# Patient Record
Sex: MALE | Race: WHITE | NOT HISPANIC OR LATINO | Employment: OTHER | URBAN - METROPOLITAN AREA
[De-identification: names, ages, dates, MRNs, and addresses within clinical notes are randomized per-mention and may not be internally consistent; named-entity substitution may affect disease eponyms.]

---

## 2019-05-08 ENCOUNTER — HOSPITAL ENCOUNTER (OUTPATIENT)
Dept: OTHER | Facility: HOSPITAL | Age: 69
Discharge: HOME | End: 2019-05-08

## 2020-10-29 ENCOUNTER — OFFICE VISIT CONVERTED (OUTPATIENT)
Dept: FAMILY MEDICINE CLINIC | Age: 70
End: 2020-10-29
Attending: FAMILY MEDICINE

## 2020-11-24 ENCOUNTER — OFFICE VISIT CONVERTED (OUTPATIENT)
Dept: FAMILY MEDICINE CLINIC | Age: 70
End: 2020-11-24
Attending: FAMILY MEDICINE

## 2020-11-24 ENCOUNTER — HOSPITAL ENCOUNTER (OUTPATIENT)
Dept: OTHER | Facility: HOSPITAL | Age: 70
Discharge: HOME | End: 2020-11-24
Attending: FAMILY MEDICINE

## 2020-11-24 LAB
BASOPHILS # BLD MANUAL: 0.04 10*3/UL (ref 0–0.2)
BASOPHILS NFR BLD MANUAL: 0.7 % (ref 0–3)
DEPRECATED RDW RBC AUTO: 52.1 FL
EOSINOPHIL # BLD MANUAL: 0.07 10*3/UL (ref 0–0.7)
EOSINOPHIL NFR BLD MANUAL: 1.3 % (ref 0–7)
ERYTHROCYTE [DISTWIDTH] IN BLOOD BY AUTOMATED COUNT: 13.1 % (ref 11.5–14.5)
GRANS (ABSOLUTE): 3.59 10*3/UL (ref 2–8)
GRANS: 67.3 % (ref 30–85)
HBA1C MFR BLD: 15.2 G/DL (ref 14–18)
HCT VFR BLD AUTO: 44.7 % (ref 42–52)
IMM GRANULOCYTES # BLD: 0 10*3/UL (ref 0–0.54)
IMM GRANULOCYTES NFR BLD: 0 % (ref 0–0.43)
LYMPHOCYTES # BLD MANUAL: 1.28 10*3/UL (ref 1–5)
LYMPHOCYTES NFR BLD MANUAL: 6.7 % (ref 3–10)
MCH RBC QN AUTO: 35.8 PG (ref 27–31)
MCHC RBC AUTO-ENTMCNC: 34 G/DL (ref 33–37)
MCV RBC AUTO: 105.4 FL (ref 80–96)
MONOCYTES # BLD AUTO: 0.36 10*3/UL (ref 0.2–1.2)
PLATELET # BLD AUTO: 155 10*3/UL (ref 130–400)
PMV BLD AUTO: 10.7 FL (ref 7.4–10.4)
RBC # BLD AUTO: 4.24 10*6/UL (ref 4.7–6.1)
VARIANT LYMPHS NFR BLD MANUAL: 24 % (ref 20–45)
WBC # BLD AUTO: 5.34 10*3/UL (ref 4.8–10.8)

## 2020-11-25 LAB
ALBUMIN SERPL-MCNC: 4.3 G/DL (ref 3.5–5)
ALBUMIN/GLOB SERPL: 1.6 {RATIO} (ref 1.4–2.6)
ALP SERPL-CCNC: 76 U/L (ref 56–155)
ALT SERPL-CCNC: 30 U/L (ref 10–40)
ANION GAP SERPL CALC-SCNC: 15 MMOL/L (ref 8–19)
AST SERPL-CCNC: 40 U/L (ref 15–50)
BILIRUB SERPL-MCNC: 0.29 MG/DL (ref 0.2–1.3)
BUN SERPL-MCNC: 13 MG/DL (ref 5–25)
BUN/CREAT SERPL: 12 {RATIO} (ref 6–20)
CALCIUM SERPL-MCNC: 9 MG/DL (ref 8.7–10.4)
CHLORIDE SERPL-SCNC: 99 MMOL/L (ref 99–111)
CONV CO2: 28 MMOL/L (ref 22–32)
CONV TOTAL PROTEIN: 7 G/DL (ref 6.3–8.2)
CREAT UR-MCNC: 1.1 MG/DL (ref 0.7–1.2)
GFR SERPLBLD BASED ON 1.73 SQ M-ARVRAT: >60 ML/MIN/{1.73_M2}
GLOBULIN UR ELPH-MCNC: 2.7 G/DL (ref 2–3.5)
GLUCOSE SERPL-MCNC: 143 MG/DL (ref 70–99)
OSMOLALITY SERPL CALC.SUM OF ELEC: 287 MOSM/KG (ref 273–304)
POTASSIUM SERPL-SCNC: 4.7 MMOL/L (ref 3.5–5.3)
SODIUM SERPL-SCNC: 137 MMOL/L (ref 135–147)
TESTOST SERPL-MCNC: 251 NG/DL (ref 193–740)

## 2020-11-26 LAB — PRIMIDONE SERPL-MCNC: 1.4 UG/ML (ref 5–12)

## 2020-12-23 ENCOUNTER — OFFICE VISIT CONVERTED (OUTPATIENT)
Dept: FAMILY MEDICINE CLINIC | Age: 70
End: 2020-12-23
Attending: FAMILY MEDICINE

## 2020-12-29 ENCOUNTER — HOSPITAL ENCOUNTER (OUTPATIENT)
Dept: OTHER | Facility: HOSPITAL | Age: 70
Discharge: HOME | End: 2020-12-29
Attending: FAMILY MEDICINE

## 2020-12-29 LAB
BASOPHILS # BLD MANUAL: 0.04 10*3/UL (ref 0–0.2)
BASOPHILS NFR BLD MANUAL: 0.6 % (ref 0–3)
DEPRECATED RDW RBC AUTO: 49.3 FL
EOSINOPHIL # BLD MANUAL: 0.25 10*3/UL (ref 0–0.7)
EOSINOPHIL NFR BLD MANUAL: 4 % (ref 0–7)
ERYTHROCYTE [DISTWIDTH] IN BLOOD BY AUTOMATED COUNT: 13 % (ref 11.5–14.5)
FOLATE SERPL-MCNC: >20 NG/ML (ref 4.8–20)
GRANS (ABSOLUTE): 3.72 10*3/UL (ref 2–8)
GRANS: 60 % (ref 30–85)
HBA1C MFR BLD: 15.2 G/DL (ref 14–18)
HCT VFR BLD AUTO: 44.5 % (ref 42–52)
IMM GRANULOCYTES # BLD: 0.01 10*3/UL (ref 0–0.54)
IMM GRANULOCYTES NFR BLD: 0.2 % (ref 0–0.43)
LYMPHOCYTES # BLD MANUAL: 1.58 10*3/UL (ref 1–5)
LYMPHOCYTES NFR BLD MANUAL: 9.7 % (ref 3–10)
MCH RBC QN AUTO: 35.1 PG (ref 27–31)
MCHC RBC AUTO-ENTMCNC: 34.2 G/DL (ref 33–37)
MCV RBC AUTO: 102.8 FL (ref 80–96)
MONOCYTES # BLD AUTO: 0.6 10*3/UL (ref 0.2–1.2)
PLATELET # BLD AUTO: 154 10*3/UL (ref 130–400)
PMV BLD AUTO: 10.5 FL (ref 7.4–10.4)
RBC # BLD AUTO: 4.33 10*6/UL (ref 4.7–6.1)
TSH SERPL-ACNC: 3.6 M[IU]/L (ref 0.27–4.2)
VARIANT LYMPHS NFR BLD MANUAL: 25.5 % (ref 20–45)
VIT B12 SERPL-MCNC: 1442 PG/ML (ref 211–911)
WBC # BLD AUTO: 6.2 10*3/UL (ref 4.8–10.8)

## 2020-12-31 LAB — PRIMIDONE SERPL-MCNC: 3.1 UG/ML (ref 5–12)

## 2021-05-18 NOTE — PROGRESS NOTES
Cesario Dueñas  1950     Office/Outpatient Visit    Visit Date: Tue, Nov 24, 2020 10:02 am    Provider: Chuck Portillo MD (Assistant: Vika Ariza RN)    Location: [facility]        Electronically signed by Chuck Portillo MD on  11/24/2020 12:39:37 PM                             Subjective:        CC: Mr. Dueñas is a 70 year old White male.  This is a follow-up visit.          HPI:       Pertaining to testosterone deficiency, Cesario takes testosterone shots that are prescribed by his previous PCPs office.  He takes 200 mg IM every 2 weeks. He would like to transfer these shots to our office.            In regard to the hyperlipidemia, unspecified, current treatment includes Lipitor.  Compliance with treatment has been good; he takes his medication as directed and follows up as directed.  He denies experiencing any hypercholesterolemia related symptoms.  He does not know results of any recent lab monitoring.        Pertaining to anxiety and depression, Cesario has a longstanding history.  He has trialed multiple medications in the past and has even tried ECT before.  His current regimen includes gabapentin 600 mg BID and Xanax 0.5 mg 3 times daily as needed.  He follows with a psychiatrist regularly.  He says his symptoms are stable. No SI or HI.      Pertaining to tremor, Cesario reports that the primidone 50 mg daily started at last visit has elicited minor improvement in his hand tremor. Again, tremor is worse with activity and does not seem to be present at rest. He has tried an failed propranolol in the past.       Pertaining to allergic rhinitis, Cesario reports his symptoms are currently stable.  His regimen includes allergy shots, albuterol as needed azelastine as needed and Zyrtec daily.      Pertaining to bilateral shoulder pain, Cesario was referred to PT after last visit and he says that this has been helpful. His pain has improved as has his mobility. He still has  some issues with overhead movements.    ROS:     CONSTITUTIONAL:  Negative for chills, fatigue, fever, and weight change.      E/N/T:  Negative for ear pain and tinnitus.      CARDIOVASCULAR:  Negative for chest pain, dizziness, palpitations and edema.      RESPIRATORY:  Negative for dyspnea and cough.      GASTROINTESTINAL:  Negative for abdominal pain, diarrhea, nausea and vomiting.      GENITOURINARY:  Positive for erectile dysfunction.      MUSCULOSKELETAL:  Positive for bilateral shoulder pain.      NEUROLOGICAL:  Positive for tremor.   Negative for headaches, paresthesias or weakness.      ALLERGIC/IMMUNOLOGIC:  Positive for seasonal allergies and perennial allergies.      PSYCHIATRIC:  Positive for anxiety.   Negative for depression, sleep disturbance or suicidal thoughts.          Past Medical History / Family History / Social History:         Last Reviewed on 11/24/2020 12:39 PM by Chuck Portillo    Past Medical History:             PAST MEDICAL HISTORY         Positive for    Hyperlipidemia;     Positive for    Erectile Dysfunction and    Testosterone deficiency;     Positive for    Allergies;     Positive for    Anxiety;         PREVENTIVE HEALTH MAINTENANCE             COLORECTAL CANCER SCREENING:; colonoscopy with normal results; The next colonoscopy is due  May 2021     PSA: but the results are unknown         Surgical History:         Positive for    Appendectomy;         Family History:         Positive for Coronary Artery Disease and Hypertension;     Positive for Colon Cancer;         Social History:     Occupation: Retired (Prior occupation: )     Marital Status:      Children: 2 children         Tobacco/Alcohol/Supplements:     Last Reviewed on 11/24/2020 12:39 PM by Chuck Portillo    Tobacco: (25 years ago pack-year history).  Non-drinker         Substance Abuse History:     Last Reviewed on 11/24/2020 12:39 PM by Chuck Portillo        Mental Health History:     Last Reviewed on  11/24/2020 12:39 PM by Chuck Portillo        Communicable Diseases (eg STDs):     Last Reviewed on 11/24/2020 12:39 PM by Chuck Portillo        Current Problems:     Last Reviewed on 11/24/2020 12:39 PM by Chuck Portillo    Testicular hypofunction    Hyperlipidemia, unspecified    Generalized anxiety disorder    Other specified forms of tremor    Allergic rhinitis, unspecified    Pain in unspecified shoulder    Other male erectile dysfunction    Encounter for screening for depression        Immunizations:     Influenza, high dose seasonal 10/9/2020        Current Medications:     Last Reviewed on 11/24/2020 12:39 PM by Chuck Portillo    allergy shots  [weekly]    albuterol sulfate 0.63 mg/3 mL Inhalation Solution for Nebulization    azelastine 0.15 % (205.5 mcg) Intranasal Spray, Non-Aerosol [spray 1 spray (205.5 mcg) in each nostril by intranasal route 2 timesper day]    olopatadine     levocetirizine 2.5 mg/5 mL oral Solution [twice daily]    gabapentin 600 mg oral tablet [twice daily]    Xanax 0.5 mg oral tablet [take 1 tablet by oral route up to 3 times per day]    atorvastatin 20 mg oral tablet [once daily]    Viagra 100 mg oral tablet [take 1 tablet (100 mg) by oral route once daily as needed approximately 1 hour before sexual activity]    multivitamin     DHEA 50 mg oral capsule    Vitamin D3 25 mcg (1,000 unit) oral capsule [Take 1 tab PO qd]    ginseng     Omega DHA     primidone 50 mg oral tablet [take 1 tab daily]    testosterone cypionate 200 mg/mL intramuscular Oil [inject 1milliliter (200 mg) by intramuscular route every 2 weeks]        Objective:        Vitals:         Current: 11/24/2020 10:05:58 AM    Ht:  5 ft, 9 in;  Wt: 197.6 lbs;  BMI: 29.2T: 97.3 F (temporal);  BP: 138/77 mm Hg (left arm, sitting);  P: 78 bpm (right arm (BP Cuff), sitting)        Exams:     PHYSICAL EXAM:     GENERAL: Vitals recorded well developed, well nourished;  no apparent distress;     EYES: conjunctiva and cornea are normal;  "    RESPIRATORY: Clear to auscultation bilateally; no rales (\"crackles\") present; no rhonchi; no wheezes;     CARDIOVASCULAR: normal rate; rhythm is regular;  No murmurs, clicks, gallops or rubs appreciated; no edema;     SKIN:  No significant rashes, lesions or suspicious moles within limits of examination;     NEUROLOGIC: Grossly intact; mental status: alert and oriented x 3; cranial nerves II-XII grossly intact;     PSYCHIATRIC: appropriate affect and demeanor; normal speech pattern; Normal behavior;         Assessment:         E29.1   Testicular hypofunction       E78.5   Hyperlipidemia, unspecified       F41.1   Generalized anxiety disorder       G25.2   Other specified forms of tremor       J30.9   Allergic rhinitis, unspecified       M25.519   Pain in unspecified shoulder           ORDERS:         Meds Prescribed:       [Refilled] primidone 50 mg oral tablet [take 2 tablets (100 mg) by oral route daily], #60 (sixty) tablets, Refills: 0 (zero)         Lab Orders:       [id number]  [facility] CBC with 3 part diff  (Send-Out)            [id number]  Primidone  (Send-Out)            [id number]  [facility] Comp. Metabolic Panel  (Send-Out)            10348  [facility] - [facility] Testosterone, total  (Send-Out)                      Plan:         Testicular hypofunction- Stable.  Continue testosterone 2 mg weekly weeks.  Monitoring labs ordered today including CBC, CMP and testosterone level    LABORATORY:  Labs ordered to be performed today include Comprehensive metabolic panel and Testosterone total.            Orders:       [id number]  [facility] Comp. Metabolic Panel  (Send-Out)            [id number]  [facility] - [facility] Testosterone, total  (Send-Out)              Hyperlipidemia, unspecified- Unknown control.  Continue Lipitor 20 mg daily.  Will order lipid panel next visit.        Generalized anxiety disorder- Stable.  Continue to follow with psychiatry as directed.  Continue Xanax 0.5 mg 3 times daily as needed and gabapentin 6 mg twice daily.        " Other specified forms of tremor- Improved but not at goal.  Increase primidone to 100 mg daily.  Patient declines neurology referral.  Hydration.    LABORATORY:  Labs ordered to be performed today include CBC.            Prescriptions:       [Refilled] primidone 50 mg oral tablet [take 2 tablets (100 mg) by oral route daily], #60 (sixty) tablets, Refills: 0 (zero)           Orders:       [id number]  [facility] CBC with 3 part diff  (Send-Out)            [id number]  Primidone  (Send-Out)              Allergic rhinitis, unspecified- Stable.  Continue current regimen.        Pain in unspecified shoulder- Improved; Continue PT. Continue Motrin/tylenol as needed. If sx persist or reworsen, will consider more advanced imaging and/or specialist referral.            Charge Capture:         Primary Diagnosis:     E29.1  Testicular hypofunction           Orders:      [id number]  Office/outpatient visit; established patient, level 4  (In-House)              E78.5  Hyperlipidemia, unspecified     F41.1  Generalized anxiety disorder     G25.2  Other specified forms of tremor     J30.9  Allergic rhinitis, unspecified     M25.519  Pain in unspecified shoulder

## 2021-05-18 NOTE — PROGRESS NOTES
Cesario Dueñas  1950     Office/Outpatient Visit    Visit Date: Thu, Oct 29, 2020 01:59 pm    Provider: Chuck Portillo MD (Assistant: Sapna Llanos, )    Location: [facility]        Electronically signed by Chuck Portillo MD on  10/30/2020 10:22:30 AM                             Subjective:        CC: Mr. Dueñas is a 70 year old White male.  This is his first visit to the clinic.  hands trembling         HPI:       Cesario has a history of testosterone deficiency. He takes testosterone shots that are prescribed by his previous PCPs office.  He plans to continue to get the injections at that office until he is no longer able to.          Additionally, he presents with history of hyperlipidemia, unspecified.  current treatment includes Lipitor.  Compliance with treatment has been good; he takes his medication as directed and follows up as directed.  He denies experiencing any hypercholesterolemia related symptoms.  He does not know results of any recent lab monitoring.        Cesario has a longstanding history of anxiety and depression.  He has trialed multiple medications in the past and has even tried ECT before.  His current regimen includes gabapentin 600 mg BID and Xanax 0.5 mg 3 times daily as needed.  He follows with a psychiatrist regularly.  He says his symptoms are stable. No SI or HI.      Cesario has a longstanding history of allergic rhinitis.  He notes that his symptoms are currently stable.  His regimen includes allergy shots, albuterol as needed azelastine as needed and Zyrtec daily.      In addition to his chronic conditions above, Cesario has 2 acute complaints today.  First, he reports that he has developed a tremor of his hands bilaterally.  This has been present for several months but seems to be getting progressively worse.  Tremor is worse with activity.  It does not seem to be present at rest. Second, Cesario reports bilateral shoulder pain.  This, too, has been  present for quite some time but seems to be getting progressively worse.  His left produces more pain in his right.  Pain is worse with overhead movements as well as internal and external rotation of the shoulders.  He is not currently taking anything for his symptoms.  He does have weakness associated with exertion/lifting.  No numbness or tingling.  No known inciting event or injury.  No prior history of trauma to the area.          PHQ-9 Depression Screening: Completed form scanned and in chart; Total Score 1     ROS:     CONSTITUTIONAL:  Negative for chills, fatigue, fever, and weight change.      EYES:  Negative for blurred vision.      E/N/T:  Negative for ear pain and tinnitus.      CARDIOVASCULAR:  Negative for chest pain, dizziness, palpitations and edema.      RESPIRATORY:  Negative for dyspnea and cough.      GASTROINTESTINAL:  Negative for abdominal pain, constipation, diarrhea, heartburn, nausea and vomiting.      GENITOURINARY:  Positive for erectile dysfunction.   Negative for dysuria, hematuria or polyuria.      MUSCULOSKELETAL:  Positive for bilateral shoulder pain.      INTEGUMENTARY:  Negative for rash.      NEUROLOGICAL:  Positive for tremor.   Negative for headaches, paresthesias or weakness.      HEMATOLOGIC/LYMPHATIC:  Negative for easy bruising and excessive bleeding.      ENDOCRINE:  Negative for hair loss, heat/cold intolerance, polydipsia, and polyphagia.      ALLERGIC/IMMUNOLOGIC:  Positive for seasonal allergies and perennial allergies.      PSYCHIATRIC:  Positive for anxiety.   Negative for depression, sleep disturbance or suicidal thoughts.          Past Medical History / Family History / Social History:         Last Reviewed on 10/29/2020 06:09 PM by Chuck Portillo    Past Medical History:             PAST MEDICAL HISTORY         Positive for    Hyperlipidemia;     Positive for    Erectile Dysfunction and    Testosterone deficiency;     Positive for    Allergies;     Positive for     Anxiety;         PREVENTIVE HEALTH MAINTENANCE             COLORECTAL CANCER SCREENING:; colonoscopy with normal results; The next colonoscopy is due  May 2021     PSA: but the results are unknown         Surgical History:         Positive for    Appendectomy;         Family History:         Positive for Coronary Artery Disease and Hypertension;     Positive for Colon Cancer;         Social History:     Occupation: Retired (Prior occupation: )     Marital Status:      Children: 2 children         Tobacco/Alcohol/Supplements:     Last Reviewed on 10/29/2020 06:09 PM by Chuck Portillo    Tobacco: (25 years ago pack-year history).  Non-drinker         Substance Abuse History:     Last Reviewed on 10/29/2020 06:09 PM by Chuck Portillo        Mental Health History:     Last Reviewed on 10/29/2020 06:09 PM by Chuck Portillo        Communicable Diseases (eg STDs):     Last Reviewed on 10/29/2020 06:09 PM by Chuck Portillo        Current Problems:     Last Reviewed on 10/29/2020 06:09 PM by Chuck Portillo    Testicular hypofunction    Hyperlipidemia, unspecified    Generalized anxiety disorder    Other specified forms of tremor    Allergic rhinitis, unspecified    Pain in unspecified shoulder    Other male erectile dysfunction        Immunizations:     Influenza, high dose seasonal 10/9/2020        Current Medications:     Last Reviewed on 10/29/2020 06:09 PM by Chuck Portillo    allergy shots  [weekly]    albuterol sulfate 0.63 mg/3 mL Inhalation Solution for Nebulization    azelastine 0.15 % (205.5 mcg) Intranasal Spray, Non-Aerosol [spray 1 spray (205.5 mcg) in each nostril by intranasal route 2 timesper day]    olopatadine     levocetirizine 2.5 mg/5 mL oral Solution [twice daily]    gabapentin 600 mg oral tablet [twice daily]    Xanax 0.5 mg oral tablet [take 1 tablet by oral route up to 3 times per day]    atorvastatin 20 mg oral tablet [once daily]    Viagra 100 mg oral tablet [take 1 tablet (100 mg) by  "oral route once daily as needed approximately 1 hour before sexual activity]    testosterone shots  [every 2 weeks]    multivitamin     DHEA 50 mg oral capsule    Vitamin D3 25 mcg (1,000 unit) oral capsule [Take 1 tab PO qd]    ginseng     Omega DHA         Objective:        Vitals:         Current: 10/29/2020 2:11:03 PM    Ht:  5 ft, 9 in;  Wt: 192.6 lbs;  BMI: 28.4T: 97.4 F (temporal);  BP: 162/89 mm Hg (right arm, sitting);  P: 92 bpm (right arm (BP Cuff), sitting)        Repeat:     2:11:49 PM  BP:   140/82mm Hg (left arm, sitting, HR: 94)     Exams:     PHYSICAL EXAM:     GENERAL: Vitals recorded well developed, well nourished;  no apparent distress;     EYES: conjunctiva and cornea are normal;     E/N/T:  normal EACs, TMs, nasal/oral mucosa, teeth, gingiva, and oropharynx;     NECK: trachea is midline; thyroid is non-palpable;     RESPIRATORY: Clear to auscultation bilateally; no rales (\"crackles\") present; no rhonchi; no wheezes;     CARDIOVASCULAR: normal rate; rhythm is regular;  No murmurs, clicks, gallops or rubs appreciated; no edema;     GASTROINTESTINAL: nontender; Soft and nondistended; normal bowel sounds; no organomegaly; no masses;     LYMPHATIC: no enlargement of cervical or facial nodes; no supraclavicular nodes;     SKIN:  No significant rashes, lesions or suspicious moles within limits of examination;     MUSCULOSKELETAL: pain with range of motion in: bilateral shoulder flexion, abduction, internal rotation, and external rotation;  tone: intention tremor of both upper limbs;  5/5 strength of shoulders in all planes of motion; empty can test negative on the right anf equivocal on the right;     NEUROLOGIC: Grossly intact; mental status: alert and oriented x 3; cranial nerves II-XII grossly intact;     PSYCHIATRIC: appropriate affect and demeanor; normal speech pattern; Normal behavior;         Assessment:         E29.1   Testicular hypofunction       E78.5   Hyperlipidemia, unspecified       " F41.1   Generalized anxiety disorder       J30.9   Allergic rhinitis, unspecified       G25.2   Other specified forms of tremor       M25.519   Pain in unspecified shoulder       Z13.31   Encounter for screening for depression           ORDERS:         Meds Prescribed:       [New Rx] primidone 50 mg oral tablet [take 1 tab daily], #30 (thirty) tablets, Refills: 0 (zero)         Procedures Ordered:       RFPT  Physical/Occupational Therapy Referral  (Send-Out)              Other Orders:         Depression screen negative  (In-House)                      Plan:         Testicular hypofunction- Stable.  Continue current regimen.        Hyperlipidemia, unspecified- Unknown control.  Will request labs from previous PCP.  Continue Lipitor 20 mg daily.        Generalized anxiety disorderStable.  Continue to follow psychiatry as directed.  Continue Xanax 0.5 mg 3 times daily as needed gabapentin 600 mg twice daily.        Allergic rhinitis, unspecified- Stable.  Continue current regimen.        Other specified forms of tremor- Appears to be consistent with essential tremor.  Previously failed propranolol.  Will start primidone 50 mg daily.  Return to clinic in 1 month for reevaluation. If no improvement with primidone, will refer to neurology for further evaluation.          Prescriptions:       [New Rx] primidone 50 mg oral tablet [take 1 tab daily], #30 (thirty) tablets, Refills: 0 (zero)         Pain in unspecified shoulder- Appears to be degenerative in nature (OA versus rotator cuff tendinopathy).  Will treat conservatively at this time.  Tylenol and/or Motrin as needed.  Ice affected area for 20 minutes on/20 minutes off as often as able/tolerated.  Physical therapy referral placed.  If no improvement, will consider imaging and/or specialist referral.        REFERRALS:  Referral initiated to physical therapy ( [facility] ) for evaluation and treatment.            Orders:       RFPT  Physical/Occupational Therapy  Referral  (Send-Out)              Encounter for screening for depression    MIPS PHQ-9 Depression Screening: Completed form scanned and in chart; Total Score 1; Negative Depression Screen           Orders:         Depression screen negative  (In-House)                  Charge Capture:         Primary Diagnosis:     E29.1  Testicular hypofunction           Orders:      [id number]  Office/outpatient visit; new patient, level 4  (In-House)              E78.5  Hyperlipidemia, unspecified     F41.1  Generalized anxiety disorder     J30.9  Allergic rhinitis, unspecified     G25.2  Other specified forms of tremor     M25.519  Pain in unspecified shoulder     Z13.31  Encounter for screening for depression           Orders:        Depression screen negative  (In-House)

## 2021-05-18 NOTE — PROGRESS NOTES
Cesario Dueñas  1950     Office/Outpatient Visit    Visit Date: Wed, Dec 23, 2020 09:45 am    Provider: Chuck Portillo MD (Assistant: Janki Chicas MA)    Location: [facility]        Electronically signed by Chuck Portillo MD on  12/23/2020 10:29:14 AM                             Subjective:        CC: Mr. Dueñas is a 70 year old White male.  Patient is presented today for a follow up on his left shoulder from PT. He has been having tremors in both hands and needs an xray of left shoulder for PT purposes. [employer] audio [phone] Today's encounter is being done with a telehealth visit. He has consented verbally with two witnesses for todays treatment. Todays visit is being conducted by audio only. Individuals present during the telemedicine consultation include patient and Dr. Portillo         HPI:       Cesario has a history of testosterone deficiency. He takes testosterone shots - 200 mg every 2 weeks. His last testosterone level was 251 on 11/24/20.          Additionally, he presents with history of hyperlipidemia, unspecified.  current treatment includes Lipitor.  Compliance with treatment has been good; he takes his medication as directed and follows up as directed.  He denies experiencing any hypercholesterolemia related symptoms.  He does not know results of any recent lab monitoring.        Pertaining to anxiety and depression, Cesario reports that his sx are currently stable.  He has trialed multiple medications in the past and has even tried ECT before.  His current regimen includes gabapentin 600 mg BID and Xanax 0.5 mg 3 times daily as needed.  He follows with a psychiatrist regularly.  He says his symptoms are stable. No SI or HI.      Pertaining to tremor, Cesario was started on primidone 2 visits ago.  At last visit, this dose was increased to 100 mg daily.  While he thinks his tremor has improved, he notes that it has still persisted.  Tremor is primarily of the bilateral  hands. Tremor is worse with activity.  It does not seem to be present at rest. He has been reluctant to proceed with a neurology referral.  Today, he would like to increase the dose of primidone 1 more time and if this is not successful then he will need to go see neurology.  He has tried and failed propranolol in the past.      Pertaining to allergic rhinitis, Cesario notes that his symptoms are currently stable.  His regimen includes allergy shots, albuterol as needed azelastine as needed and Zyrtec daily.      Pertaining to shoulder pain, Cesario reports that his right shoulder has gotten better with PT.  His left shoulder is improving to but is not responding the way that the right shoulder has or how his physical therapist thinks it should.  They are requesting an x-ray.  Pain continues to be worse with overhead movements.  No weakness.  No paresthesias.      At last visit during labs to monitor testosterone therapy, Cesario was found to have an MCV of over 105.  He was not anemic.  He notes that in the past he did have a physician telling that he did take B12.  He is unsure whether he is ever had his folate or B12 levels checked.  No easy bruising or bleeding.    ROS:     CONSTITUTIONAL:  Negative for chills, fatigue, fever, and weight change.      E/N/T:  Negative for ear pain and tinnitus.      CARDIOVASCULAR:  Negative for chest pain, dizziness, palpitations and edema.      RESPIRATORY:  Negative for dyspnea and cough.      GASTROINTESTINAL:  Negative for abdominal pain, diarrhea, nausea and vomiting.      GENITOURINARY:  Positive for erectile dysfunction.      MUSCULOSKELETAL:  Positive for bilateral shoulder pain.      NEUROLOGICAL:  Positive for tremor.   Negative for headaches, paresthesias or weakness.      ALLERGIC/IMMUNOLOGIC:  Positive for seasonal allergies and perennial allergies.      PSYCHIATRIC:  Positive for anxiety.   Negative for depression, sleep disturbance or suicidal thoughts.           Past Medical History / Family History / Social History:         Last Reviewed on 12/23/2020 10:22 AM by Chuck Portillo    Past Medical History:             PAST MEDICAL HISTORY         Positive for    Hyperlipidemia;     Positive for    Erectile Dysfunction and    Testosterone deficiency;     Positive for    Allergies;     Positive for    Anxiety;         PREVENTIVE HEALTH MAINTENANCE             COLORECTAL CANCER SCREENING:; colonoscopy with normal results; The next colonoscopy is due  May 2021     PSA: but the results are unknown         Surgical History:         Positive for    Appendectomy;         Family History:         Positive for Coronary Artery Disease and Hypertension;     Positive for Colon Cancer;         Social History:     Occupation: Retired (Prior occupation: )     Marital Status:      Children: 2 children         Tobacco/Alcohol/Supplements:     Last Reviewed on 12/23/2020 10:22 AM by Chuck Portillo    Tobacco: (25 years ago pack-year history).  Non-drinker         Substance Abuse History:     Last Reviewed on 12/23/2020 10:22 AM by Chuck Portillo        Mental Health History:     Last Reviewed on 12/23/2020 10:22 AM by Chuck Portillo        Communicable Diseases (eg STDs):     Last Reviewed on 12/23/2020 10:22 AM by Chuck Portillo        Current Problems:     Last Reviewed on 12/23/2020 10:22 AM by Chuck Portillo    Testicular hypofunction    Hyperlipidemia, unspecified    Generalized anxiety disorder    Other specified forms of tremor    Allergic rhinitis, unspecified    Pain in unspecified shoulder    Other male erectile dysfunction    Encounter for screening for depression    Fatigue    Testosterone deficiency        Immunizations:     Influenza, high dose seasonal 10/9/2020        Current Medications:     Last Reviewed on 12/23/2020 10:22 AM by Chuck Portillo    allergy shots  [weekly]    albuterol sulfate 0.63 mg/3 mL Inhalation Solution for Nebulization    azelastine 0.15 %  (205.5 mcg) Intranasal Spray, Non-Aerosol [spray 1 spray (205.5 mcg) in each nostril by intranasal route 2 timesper day]    olopatadine     levocetirizine 2.5 mg/5 mL oral Solution [twice daily]    gabapentin 600 mg oral tablet [twice daily]    Xanax 0.5 mg oral tablet [take 1 tablet by oral route up to 3 times per day]    atorvastatin 20 mg oral tablet [once daily]    Viagra 100 mg oral tablet [take 1 tablet (100 mg) by oral route once daily as needed approximately 1 hour before sexual activity]    multivitamin     DHEA 50 mg oral capsule    Vitamin D3 25 mcg (1,000 unit) oral capsule [Take 1 tab PO qd]    ginseng     Omega DHA     primidone 50 mg oral tablet [take 2 tablets (100 mg) by oral route daily]    testosterone cypionate 200 mg/mL intramuscular Oil [inject 1milliliter (200 mg) by intramuscular route every 2 weeks]        Assessment:         E29.1   Testicular hypofunction       E78.5   Hyperlipidemia, unspecified       F41.1   Generalized anxiety disorder       G25.2   Other specified forms of tremor       J30.9   Allergic rhinitis, unspecified       M25.519   Pain in unspecified shoulder       D75.89   Other specified diseases of blood and blood-forming organs           ORDERS:         Radiology/Test Orders:       [id number]  Left Radiologic exam, shoulder; comp, 2 views  (Send-Out)              Lab Orders:       [id number]  Primidone  (Send-Out)            74448  B12FO - [facility] Vitamin B12 with Folate  (Send-Out)            20580  BDCBC - [facility] CBC with 3 part diff  (Send-Out)            09813  TSH - [facility] TSH  (Send-Out)                      Plan:         Testicular hypofunction- Stable.  Continue testosterone injections–200 mg every 2 weeks    Telehealth: Verbal consent obtained for visit to occur via phone call; Total time spent was 13 minutes; 62892--Rwypcnxxe E/M 11-20 minutes         Hyperlipidemia, unspecifiedContinue Lipitor 20 mg daily. Lipid panel at next visit.        Generalized anxiety disorderStable.   Continue to follow psychiatry as directed.  Continue Xanax 0.5 mg 3 times daily as needed and gabapentin 600 mg twice daily.        Other specified forms of tremorImproved but persistent.  Increase primidone to 150 mg daily.  Patient continues to decline neurology referral but says that if 150 mg daily is not effective he will allow us to refer him.  Serum primidone level ordered today.          Orders:       [id number]  Primidone  (Send-Out)              Allergic rhinitis, unspecified- Stable.  Continue current regimen.        Pain in unspecified shoulderImproved.  Right shoulder near normal.  Left shoulder improved but showing persistent symptoms.  Left  shoulder x-ray ordered today.  Continue physical therapy.  Continue Motrin and Tylenol as needed.        RADIOLOGY:  I have ordered Shoulder x-ray: left shoulder to be done today.            Orders:       [id number]  Left Radiologic exam, shoulder; comp, 2 views  (Send-Out)              Other specified diseases of blood and blood-forming organs- Unclear etiology.  CBC, TSH,  folate and B12 ordered today.    LABORATORY:  Labs ordered to be performed today include B12 with Folate, CBC, and TSH.            Orders:       [id number]  B12FO - [facility] Vitamin B12 with Folate  (Send-Out)            61519  BDCBC - [facility] CBC with 3 part diff  (Send-Out)            91886  TSH - [facility] TSH  (Send-Out)                  Charge Capture:         Primary Diagnosis:     E29.1  Testicular hypofunction           Orders:      [id number]  Phys/QHP telephone evaluation 11-20 minutes  (In-House)              E78.5  Hyperlipidemia, unspecified     F41.1  Generalized anxiety disorder     G25.2  Other specified forms of tremor     J30.9  Allergic rhinitis, unspecified     M25.519  Pain in unspecified shoulder     D75.89  Other specified diseases of blood and blood-forming organs

## 2021-06-15 ENCOUNTER — CLINICAL SUPPORT (OUTPATIENT)
Dept: FAMILY MEDICINE CLINIC | Age: 71
End: 2021-06-15

## 2021-06-15 VITALS — HEART RATE: 73 BPM | DIASTOLIC BLOOD PRESSURE: 84 MMHG | SYSTOLIC BLOOD PRESSURE: 133 MMHG

## 2021-06-15 DIAGNOSIS — E29.1 TESTOSTERONE DEFICIENCY IN MALE: Primary | ICD-10-CM

## 2021-06-15 PROCEDURE — 96372 THER/PROPH/DIAG INJ SC/IM: CPT | Performed by: FAMILY MEDICINE

## 2021-06-15 RX ORDER — TESTOSTERONE CYPIONATE 200 MG/ML
200 INJECTION, SOLUTION INTRAMUSCULAR
Status: DISCONTINUED | OUTPATIENT
Start: 2021-06-15 | End: 2021-07-13

## 2021-06-15 RX ADMIN — TESTOSTERONE CYPIONATE 200 MG: 200 INJECTION, SOLUTION INTRAMUSCULAR at 13:03

## 2021-06-30 ENCOUNTER — CLINICAL SUPPORT (OUTPATIENT)
Dept: FAMILY MEDICINE CLINIC | Age: 71
End: 2021-06-30

## 2021-06-30 DIAGNOSIS — E34.9 TESTOSTERONE DEFICIENCY: ICD-10-CM

## 2021-06-30 PROCEDURE — 96372 THER/PROPH/DIAG INJ SC/IM: CPT | Performed by: FAMILY MEDICINE

## 2021-06-30 RX ADMIN — TESTOSTERONE CYPIONATE 200 MG: 200 INJECTION, SOLUTION INTRAMUSCULAR at 10:56

## 2021-07-02 VITALS
BODY MASS INDEX: 29.27 KG/M2 | WEIGHT: 197.6 LBS | HEIGHT: 69 IN | HEART RATE: 78 BPM | DIASTOLIC BLOOD PRESSURE: 77 MMHG | TEMPERATURE: 97.3 F | SYSTOLIC BLOOD PRESSURE: 138 MMHG

## 2021-07-02 VITALS
HEIGHT: 69 IN | TEMPERATURE: 97.4 F | DIASTOLIC BLOOD PRESSURE: 82 MMHG | WEIGHT: 192.6 LBS | BODY MASS INDEX: 28.53 KG/M2 | SYSTOLIC BLOOD PRESSURE: 140 MMHG | HEART RATE: 92 BPM

## 2021-07-07 ENCOUNTER — CLINICAL SUPPORT (OUTPATIENT)
Dept: FAMILY MEDICINE CLINIC | Age: 71
End: 2021-07-07

## 2021-07-07 DIAGNOSIS — J30.9 ALLERGIC RHINITIS, UNSPECIFIED SEASONALITY, UNSPECIFIED TRIGGER: Primary | ICD-10-CM

## 2021-07-07 PROCEDURE — 95117 IMMUNOTHERAPY INJECTIONS: CPT | Performed by: FAMILY MEDICINE

## 2021-07-13 RX ORDER — TESTOSTERONE CYPIONATE 200 MG/ML
INJECTION, SOLUTION INTRAMUSCULAR
Qty: 2 ML | Refills: 1 | Status: SHIPPED | OUTPATIENT
Start: 2021-07-13 | End: 2021-09-22 | Stop reason: SDUPTHER

## 2021-07-14 ENCOUNTER — CLINICAL SUPPORT (OUTPATIENT)
Dept: FAMILY MEDICINE CLINIC | Age: 71
End: 2021-07-14

## 2021-07-14 DIAGNOSIS — E29.1 TESTOSTERONE DEFICIENCY IN MALE: ICD-10-CM

## 2021-07-14 DIAGNOSIS — J30.9 ALLERGIC RHINITIS, UNSPECIFIED SEASONALITY, UNSPECIFIED TRIGGER: Primary | ICD-10-CM

## 2021-07-14 PROCEDURE — 96372 THER/PROPH/DIAG INJ SC/IM: CPT | Performed by: FAMILY MEDICINE

## 2021-07-14 PROCEDURE — 95117 IMMUNOTHERAPY INJECTIONS: CPT | Performed by: FAMILY MEDICINE

## 2021-07-14 RX ADMIN — TESTOSTERONE CYPIONATE 200 MG: 200 INJECTION, SOLUTION INTRAMUSCULAR at 14:23

## 2021-07-14 NOTE — PROGRESS NOTES
I have reviewed the notes, assessments, and/or procedures performed by the MA/LPN and I concur with his/her documentation of the patient's care.     Chuck Portillo MD   7/14/2021 16:57 EDT

## 2021-07-20 ENCOUNTER — CLINICAL SUPPORT (OUTPATIENT)
Dept: FAMILY MEDICINE CLINIC | Age: 71
End: 2021-07-20

## 2021-07-20 DIAGNOSIS — J30.9 ALLERGIC RHINITIS, UNSPECIFIED SEASONALITY, UNSPECIFIED TRIGGER: Primary | ICD-10-CM

## 2021-07-20 PROCEDURE — 95117 IMMUNOTHERAPY INJECTIONS: CPT | Performed by: FAMILY MEDICINE

## 2021-07-20 NOTE — PROGRESS NOTES
I have reviewed the notes, assessments, and/or procedures performed by Dian Smith LPN, and I concur with her documentation of Cesario Dueñas.

## 2021-07-25 RX ORDER — ATORVASTATIN CALCIUM 20 MG/1
TABLET, FILM COATED ORAL
Qty: 30 TABLET | Refills: 0 | Status: SHIPPED | OUTPATIENT
Start: 2021-07-25 | End: 2021-08-26

## 2021-08-02 ENCOUNTER — CLINICAL SUPPORT (OUTPATIENT)
Dept: FAMILY MEDICINE CLINIC | Age: 71
End: 2021-08-02

## 2021-08-02 DIAGNOSIS — E29.1 TESTOSTERONE DEFICIENCY IN MALE: ICD-10-CM

## 2021-08-02 DIAGNOSIS — J30.9 ALLERGIC RHINITIS, UNSPECIFIED SEASONALITY, UNSPECIFIED TRIGGER: Primary | ICD-10-CM

## 2021-08-02 PROCEDURE — 96372 THER/PROPH/DIAG INJ SC/IM: CPT | Performed by: FAMILY MEDICINE

## 2021-08-02 PROCEDURE — 95117 IMMUNOTHERAPY INJECTIONS: CPT | Performed by: FAMILY MEDICINE

## 2021-08-03 VITALS — SYSTOLIC BLOOD PRESSURE: 132 MMHG | HEART RATE: 53 BPM | DIASTOLIC BLOOD PRESSURE: 75 MMHG

## 2021-08-03 RX ORDER — TESTOSTERONE CYPIONATE 200 MG/ML
200 INJECTION, SOLUTION INTRAMUSCULAR
Status: DISCONTINUED | OUTPATIENT
Start: 2021-08-03 | End: 2021-12-23

## 2021-08-03 RX ADMIN — TESTOSTERONE CYPIONATE 200 MG: 200 INJECTION, SOLUTION INTRAMUSCULAR at 13:09

## 2021-08-03 NOTE — PROGRESS NOTES
I have reviewed the notes, assessments, and/or procedures performed by the MA/LPN and I concur with his/her documentation of the patient's care.     Chuck Portillo MD   8/3/2021 12:56 EDT

## 2021-08-09 ENCOUNTER — CLINICAL SUPPORT (OUTPATIENT)
Dept: FAMILY MEDICINE CLINIC | Age: 71
End: 2021-08-09

## 2021-08-09 DIAGNOSIS — J30.9 ALLERGIC RHINITIS, UNSPECIFIED SEASONALITY, UNSPECIFIED TRIGGER: Primary | ICD-10-CM

## 2021-08-09 PROCEDURE — 95117 IMMUNOTHERAPY INJECTIONS: CPT | Performed by: FAMILY MEDICINE

## 2021-08-13 ENCOUNTER — DOCUMENTATION (OUTPATIENT)
Dept: FAMILY MEDICINE CLINIC | Age: 71
End: 2021-08-13

## 2021-08-20 ENCOUNTER — CLINICAL SUPPORT (OUTPATIENT)
Dept: FAMILY MEDICINE CLINIC | Age: 71
End: 2021-08-20

## 2021-08-20 VITALS — DIASTOLIC BLOOD PRESSURE: 83 MMHG | SYSTOLIC BLOOD PRESSURE: 147 MMHG | HEART RATE: 77 BPM

## 2021-08-20 DIAGNOSIS — E29.1 TESTOSTERONE DEFICIENCY IN MALE: ICD-10-CM

## 2021-08-20 DIAGNOSIS — J30.9 ALLERGIC RHINITIS, UNSPECIFIED SEASONALITY, UNSPECIFIED TRIGGER: Primary | ICD-10-CM

## 2021-08-20 PROCEDURE — 96372 THER/PROPH/DIAG INJ SC/IM: CPT | Performed by: FAMILY MEDICINE

## 2021-08-20 PROCEDURE — 95117 IMMUNOTHERAPY INJECTIONS: CPT | Performed by: FAMILY MEDICINE

## 2021-08-20 RX ADMIN — TESTOSTERONE CYPIONATE 200 MG: 200 INJECTION, SOLUTION INTRAMUSCULAR at 14:10

## 2021-08-20 NOTE — PROGRESS NOTES
DOCUMENTATION OF LAB LEVELS NEEDED FOR INSURANCE TO DETERMINE APPROVAL OR DENIAL. HAVE PRINTED AND SENT TO INSURANCE FOR RESPONSE.

## 2021-08-26 RX ORDER — ATORVASTATIN CALCIUM 20 MG/1
TABLET, FILM COATED ORAL
Qty: 30 TABLET | Refills: 0 | Status: SHIPPED | OUTPATIENT
Start: 2021-08-26 | End: 2021-09-30

## 2021-08-26 NOTE — TELEPHONE ENCOUNTER
Rx Refill Note  Requested Prescriptions     Pending Prescriptions Disp Refills   • atorvastatin (LIPITOR) 20 MG tablet [Pharmacy Med Name: ATORVASTATIN 20 MG TABLET] 30 tablet 0     Sig: TAKE ONE TABLET BY MOUTH DAILY      Last office visit with prescribing clinician: 12/23/20     Next office visit with prescribing clinician: Visit date not found    LAST REFILL-7/25/21 #30  NO LIPID PANEL DONE, NOTHING IN EMDS SEEN.    Roxana Charles LPN  08/26/21, 13:16 EDT

## 2021-09-01 ENCOUNTER — CLINICAL SUPPORT (OUTPATIENT)
Dept: FAMILY MEDICINE CLINIC | Age: 71
End: 2021-09-01

## 2021-09-01 DIAGNOSIS — J30.9 ALLERGIC RHINITIS, UNSPECIFIED SEASONALITY, UNSPECIFIED TRIGGER: Primary | ICD-10-CM

## 2021-09-01 DIAGNOSIS — E29.1 TESTOSTERONE DEFICIENCY IN MALE: ICD-10-CM

## 2021-09-01 PROCEDURE — 95117 IMMUNOTHERAPY INJECTIONS: CPT | Performed by: FAMILY MEDICINE

## 2021-09-01 PROCEDURE — 96372 THER/PROPH/DIAG INJ SC/IM: CPT | Performed by: FAMILY MEDICINE

## 2021-09-01 RX ADMIN — TESTOSTERONE CYPIONATE 200 MG: 200 INJECTION, SOLUTION INTRAMUSCULAR at 14:14

## 2021-09-10 RX ORDER — SILDENAFIL 100 MG/1
100 TABLET, FILM COATED ORAL AS NEEDED
Qty: 15 TABLET | Refills: 0 | Status: SHIPPED | OUTPATIENT
Start: 2021-09-10 | End: 2022-06-06 | Stop reason: SDUPTHER

## 2021-09-10 NOTE — TELEPHONE ENCOUNTER
Rx Refill Note  Requested Prescriptions     Pending Prescriptions Disp Refills   • sildenafil (VIAGRA) 100 MG tablet [Pharmacy Med Name: SILDENAFIL 100 MG TABLET] 15 tablet      Sig: TAKE 1 TABLET BY MOUTH DAILY AS NEEDED 1 HOUR BEFORE SEXUAL ACTIVITY      Last office visit with prescribing clinician: 12/23/20      Next office visit with prescribing clinician: Visit date not found          {TIP  Is Refill Pharmacy correct?YES  Lisseth Blanco  09/10/21, 09:47 EDT

## 2021-09-15 ENCOUNTER — CLINICAL SUPPORT (OUTPATIENT)
Dept: FAMILY MEDICINE CLINIC | Age: 71
End: 2021-09-15

## 2021-09-15 DIAGNOSIS — J30.9 ALLERGIC RHINITIS, UNSPECIFIED SEASONALITY, UNSPECIFIED TRIGGER: Primary | ICD-10-CM

## 2021-09-15 PROCEDURE — 95117 IMMUNOTHERAPY INJECTIONS: CPT | Performed by: FAMILY MEDICINE

## 2021-09-17 NOTE — TELEPHONE ENCOUNTER
Rx Refill Note  Requested Prescriptions     Pending Prescriptions Disp Refills   • Testosterone Cypionate (DEPOTESTOTERONE CYPIONATE) 200 MG/ML injection [Pharmacy Med Name: TESTOSTERONE  MG/ML SDV] 2 mL      Sig: INJECT 1ML INTRAMUSCULARLY ONCE EVERY 2 WEEKS      Last office visit with prescribing clinician: 12/23/20      Next office visit with prescribing clinician: 9/20/21 EST. CARE WITH VARSHA GÓMEZ      {TIP  Please add Last Relevant Lab Date if appropriate 11/24/20 TESTOSTERONE    NO UDS ON FILE      {TIP  Is Refill Pharmacy correct [id number] Blanco  09/17/21, 09:06 EDT

## 2021-09-18 RX ORDER — TESTOSTERONE CYPIONATE 200 MG/ML
INJECTION, SOLUTION INTRAMUSCULAR
Qty: 2 ML | OUTPATIENT
Start: 2021-09-18

## 2021-09-20 ENCOUNTER — TELEPHONE (OUTPATIENT)
Dept: FAMILY MEDICINE CLINIC | Age: 71
End: 2021-09-20

## 2021-09-20 RX ORDER — TESTOSTERONE CYPIONATE 200 MG/ML
200 INJECTION, SOLUTION INTRAMUSCULAR
Qty: 2 ML | Refills: 0 | OUTPATIENT
Start: 2021-09-20

## 2021-09-20 NOTE — TELEPHONE ENCOUNTER
Spoke with pt and appt changed. Pt was down for dipak from January for tremors. Pt wanting testosterone refilled, on call provider stated pt needed to have appt with provider that could fill testosterone, appt changed with another provider.

## 2021-09-20 NOTE — TELEPHONE ENCOUNTER
Dr Reyes said he could discuss refill with new provider. Pt has an appt today with Gina Bhatt for a RAQUEL.  She does not have a NICOLE.

## 2021-09-20 NOTE — TELEPHONE ENCOUNTER
I am not comfortable filling this.  It is a controlled substance and the patient apparently has not been seen for over 6 months.  Thanks.

## 2021-09-22 ENCOUNTER — OFFICE VISIT (OUTPATIENT)
Dept: FAMILY MEDICINE CLINIC | Age: 71
End: 2021-09-22

## 2021-09-22 VITALS
HEART RATE: 76 BPM | OXYGEN SATURATION: 99 % | SYSTOLIC BLOOD PRESSURE: 166 MMHG | TEMPERATURE: 98.1 F | DIASTOLIC BLOOD PRESSURE: 84 MMHG | WEIGHT: 193.8 LBS | BODY MASS INDEX: 28.62 KG/M2

## 2021-09-22 DIAGNOSIS — E55.9 VITAMIN D DEFICIENCY, UNSPECIFIED: ICD-10-CM

## 2021-09-22 DIAGNOSIS — E78.2 MIXED HYPERLIPIDEMIA: ICD-10-CM

## 2021-09-22 DIAGNOSIS — Z86.010 HISTORY OF COLON POLYPS: ICD-10-CM

## 2021-09-22 DIAGNOSIS — R03.0 ELEVATED BLOOD PRESSURE READING: ICD-10-CM

## 2021-09-22 DIAGNOSIS — Z79.899 OTHER LONG TERM (CURRENT) DRUG THERAPY: ICD-10-CM

## 2021-09-22 DIAGNOSIS — Z12.5 SCREENING FOR MALIGNANT NEOPLASM OF PROSTATE: ICD-10-CM

## 2021-09-22 DIAGNOSIS — J30.9 ALLERGIC RHINITIS, UNSPECIFIED SEASONALITY, UNSPECIFIED TRIGGER: Primary | ICD-10-CM

## 2021-09-22 DIAGNOSIS — G25.2 OTHER SPECIFIED FORMS OF TREMOR: ICD-10-CM

## 2021-09-22 DIAGNOSIS — F41.1 GENERALIZED ANXIETY DISORDER: ICD-10-CM

## 2021-09-22 DIAGNOSIS — E34.9 TESTOSTERONE DEFICIENCY: ICD-10-CM

## 2021-09-22 PROBLEM — R53.83 OTHER FATIGUE: Status: ACTIVE | Noted: 2021-09-22

## 2021-09-22 PROBLEM — E29.1 TESTICULAR HYPOFUNCTION: Status: ACTIVE | Noted: 2021-09-22

## 2021-09-22 PROBLEM — Z86.0100 HISTORY OF COLON POLYPS: Status: ACTIVE | Noted: 2021-09-22

## 2021-09-22 PROBLEM — N52.8 OTHER MALE ERECTILE DYSFUNCTION: Status: ACTIVE | Noted: 2021-09-22

## 2021-09-22 PROBLEM — E78.5 HYPERLIPIDEMIA, UNSPECIFIED: Status: ACTIVE | Noted: 2021-09-22

## 2021-09-22 LAB
AMPHET+METHAMPHET UR QL: NEGATIVE
AMPHETAMINES UR QL: NEGATIVE
BARBITURATES UR QL SCN: NEGATIVE
BENZODIAZ UR QL SCN: POSITIVE
BUPRENORPHINE SERPL-MCNC: NEGATIVE NG/ML
CANNABINOIDS SERPL QL: NEGATIVE
COCAINE UR QL: NEGATIVE
EXPIRATION DATE: ABNORMAL
Lab: ABNORMAL
MDMA UR QL SCN: NEGATIVE
METHADONE UR QL SCN: NEGATIVE
OPIATES UR QL: NEGATIVE
OXYCODONE UR QL SCN: NEGATIVE
PCP UR QL SCN: NEGATIVE

## 2021-09-22 PROCEDURE — 80305 DRUG TEST PRSMV DIR OPT OBS: CPT | Performed by: NURSE PRACTITIONER

## 2021-09-22 PROCEDURE — 95117 IMMUNOTHERAPY INJECTIONS: CPT | Performed by: NURSE PRACTITIONER

## 2021-09-22 PROCEDURE — 99215 OFFICE O/P EST HI 40 MIN: CPT | Performed by: NURSE PRACTITIONER

## 2021-09-22 RX ORDER — GABAPENTIN 600 MG/1
1 TABLET ORAL 2 TIMES DAILY
COMMUNITY

## 2021-09-22 RX ORDER — MELATONIN
1000 DAILY
COMMUNITY

## 2021-09-22 RX ORDER — TESTOSTERONE CYPIONATE 200 MG/ML
200 INJECTION, SOLUTION INTRAMUSCULAR
Qty: 2 ML | Refills: 0 | Status: SHIPPED | OUTPATIENT
Start: 2021-09-22 | End: 2021-09-23 | Stop reason: SDUPTHER

## 2021-09-22 RX ORDER — PRIMIDONE 50 MG/1
50 TABLET ORAL 3 TIMES DAILY
COMMUNITY
End: 2021-09-22

## 2021-09-22 RX ORDER — MONTELUKAST SODIUM 10 MG/1
10 TABLET ORAL NIGHTLY
COMMUNITY

## 2021-09-22 RX ORDER — LEVOCETIRIZINE DIHYDROCHLORIDE 5 MG/1
TABLET, FILM COATED ORAL AS NEEDED
COMMUNITY

## 2021-09-22 RX ORDER — ALPRAZOLAM 0.5 MG/1
1 TABLET ORAL NIGHTLY PRN
COMMUNITY

## 2021-09-22 RX ORDER — MULTIPLE VITAMINS W/ MINERALS TAB 9MG-400MCG
1 TAB ORAL DAILY
COMMUNITY

## 2021-09-22 NOTE — PROGRESS NOTES
Chief Complaint  Erectile Dysfunction (testosterone refill) and Mental Health Problem    Subjective          Cesario Dueñas presents to [facility]    Cesario is here today to establish care with new provider as he was previously seeing Dr Portillo who is no longer here. Notes that he was scheduled for colonoscopy that he had to cancel but plans to call to reschedule. Last was 5 years. Dr Joseluis Pina in [place]. Hx colon polyps.   UTD covid vaccine.   Had shingles, PNA vaccine at [facility].   He sees Dr Clive Singh psych for depression/anxiety. He is on gabapentin and xanax with him. Reports stable.   Seeing [facility] for allergies. On allergy shots. On Xyzal 5 mg daily, singulair 10 mg daily. Dr Adrian also started him on Vitamin D.   He reports he was previously on Vitamin B12 when seeing Dr Hernadez but was able to stop. Some recent fatigue so restarted.   Hx hyperlipidemia. On atorvastatin.   Low testosterone was diagnosed several years ago by Dr Hernadez. Unsure but maybe 5 years. He is on testosterone injections. He gets those here.   Started with tremors last year. Thought to be consistent with essential tremor. Previously failed propranolol. Dr Portillo started on primidone. This made him have fatigue, more emotional. He stopped taking on advise of his psychiatrist. He never saw neurologist.         Objective   Vital Signs:   /84 (BP Location: Left arm, Patient Position: Sitting)   Pulse 76   Temp 98.1 °F (36.7 °C)   Wt 87.9 kg (193 lb 12.8 oz)   SpO2 99%   BMI 28.62 kg/m²       Physical Exam  Vitals reviewed.   Constitutional:       General: He is not in acute distress.     Appearance: Normal appearance. He is well-developed.   HENT:      Head: Normocephalic and atraumatic.   Cardiovascular:      Rate and Rhythm: Normal rate and regular rhythm.   Pulmonary:      Effort: Pulmonary effort is normal.      Breath sounds: Normal breath sounds.   Neurological:       Mental Status: He is alert and oriented to person, place, and time.   Psychiatric:         Mood and Affect: Mood and affect normal.          Result Review :   The following data was reviewed by: HUNG Reynolds on 09/22/2021:  Primidone Level (12/29/2020 09:54)  TSH (12/29/2020 09:54)  CBC & Differential (12/29/2020 09:54)  Vitamin B12 & Folate (12/29/2020 09:54)  Primidone Level (11/24/2020 11:03)  Testosterone (11/24/2020 11:03)  Comprehensive Metabolic Panel (11/24/2020 11:03)  CBC & Differential (11/24/2020 11:03)  POC Urine Drug Screen [facility] (09/22/2021 16:12)           Assessment and Plan    Diagnoses and all orders for this visit:    1. Allergic rhinitis, unspecified seasonality, unspecified trigger (Primary)  Comments:  Continue Xyzal, Singulair. Sees allergist  Orders:  -     Allergy Serum Injection  -     Allergy Serum Injection    2. Mixed hyperlipidemia  Comments:  On atorvastatin. Due for labs  Orders:  -     Lipid Panel; Future    3. Testosterone deficiency  -     Testosterone; Future  -     Discontinue: Testosterone Cypionate (DEPOTESTOTERONE CYPIONATE) 200 MG/ML injection; Inject 1 mL into the appropriate muscle as directed by prescriber Every 14 (Fourteen) Days.  Dispense: 2 mL; Refill: 0  -     Testosterone Cypionate (DEPOTESTOTERONE CYPIONATE) 200 MG/ML injection; Inject 1 mL into the appropriate muscle as directed by prescriber Every 14 (Fourteen) Days.  Dispense: 2 mL; Refill: 0    4. Generalized anxiety disorder  Comments:  Stable on current meds. followed by psych    5. History of colon polyps  Comments:  He plans to reschedule repeat colonoscopy    6. Elevated blood pressure reading  Comments:  BP elevated today. Will continue to monitor. May need to start med if no improvement.     7. Other specified forms of tremor  Comments:  Checking labs. Consider neuro referral.   Orders:  -     Comprehensive Metabolic Panel; Future  -     CBC Auto Differential; Future  -     Vitamin B12;  Future  -     TSH; Future  -     Vitamin D 25 Hydroxy; Future    8. Vitamin D deficiency, unspecified   -     Vitamin D 25 Hydroxy; Future    9. Screening for malignant neoplasm of prostate  -     PSA Screen; Future    10. Other long term (current) drug therapy  -     POC Urine Drug Screen [facility]      Controlled substance documentation: Juan A reviewed; drug screen performed and appropriate; consent is reviewed and signed and on the chart.  Is aware of risk of addiction on this medication, understands will need to follow up for a review at least every 3 months and medications will be adjusted or decreased as deemed appropriate at each visit.  No history of drug or alcohol abuse.  No concerns about diversion or abuse. Denies side effects related to the medication.  Aware may be called in for pill counts.  The dosing of this medication will be reviewed on a regular basis and reduced if possible.     He is aware that he will have to be in contact once monthly for testosterone refills as laws limit rx to 30 days without refills for APRNs.     Will check labs.  Discussed that some vitamin deficiencies can contribute to tremor symptoms.  Will rule those out with labs.  Consider neurology referral if no lab abnormalities.  He has failed propanolol and primidone treatment.    I spent 42 minutes caring for Cesario on this date of service. This time includes time spent by me in the following activities:preparing for the visit, reviewing tests, obtaining and/or reviewing a separately obtained history, performing a medically appropriate examination and/or evaluation , counseling and educating the patient/family/caregiver, ordering medications, tests, or procedures and documenting information in the medical record  Follow Up    Return in about 3 months (around 12/22/2021).  Patient was given instructions and counseling regarding his condition or for health maintenance advice. Please see specific information pulled into  the AVS if appropriate.

## 2021-09-23 RX ORDER — TESTOSTERONE CYPIONATE 200 MG/ML
200 INJECTION, SOLUTION INTRAMUSCULAR
Qty: 2 ML | Refills: 0 | Status: SHIPPED | OUTPATIENT
Start: 2021-09-23 | End: 2021-11-03

## 2021-09-30 ENCOUNTER — LAB (OUTPATIENT)
Dept: LAB | Facility: HOSPITAL | Age: 71
End: 2021-09-30

## 2021-09-30 ENCOUNTER — CLINICAL SUPPORT (OUTPATIENT)
Dept: FAMILY MEDICINE CLINIC | Age: 71
End: 2021-09-30

## 2021-09-30 VITALS — DIASTOLIC BLOOD PRESSURE: 87 MMHG | SYSTOLIC BLOOD PRESSURE: 156 MMHG | HEART RATE: 80 BPM

## 2021-09-30 DIAGNOSIS — E55.9 VITAMIN D DEFICIENCY, UNSPECIFIED: ICD-10-CM

## 2021-09-30 DIAGNOSIS — E34.9 TESTOSTERONE DEFICIENCY: ICD-10-CM

## 2021-09-30 DIAGNOSIS — G25.2 OTHER SPECIFIED FORMS OF TREMOR: ICD-10-CM

## 2021-09-30 DIAGNOSIS — Z12.5 SCREENING FOR MALIGNANT NEOPLASM OF PROSTATE: ICD-10-CM

## 2021-09-30 DIAGNOSIS — J30.9 ALLERGIC RHINITIS, UNSPECIFIED SEASONALITY, UNSPECIFIED TRIGGER: Primary | ICD-10-CM

## 2021-09-30 DIAGNOSIS — E78.2 MIXED HYPERLIPIDEMIA: ICD-10-CM

## 2021-09-30 LAB
25(OH)D3 SERPL-MCNC: 87.5 NG/ML
ALBUMIN SERPL-MCNC: 4.4 G/DL (ref 3.5–5.2)
ALBUMIN/GLOB SERPL: 1.7 G/DL
ALP SERPL-CCNC: 71 U/L (ref 39–117)
ALT SERPL W P-5'-P-CCNC: 38 U/L (ref 1–41)
ANION GAP SERPL CALCULATED.3IONS-SCNC: 7.2 MMOL/L (ref 5–15)
AST SERPL-CCNC: 52 U/L (ref 1–40)
BASOPHILS # BLD AUTO: 0.04 10*3/MM3 (ref 0–0.2)
BASOPHILS NFR BLD AUTO: 0.7 % (ref 0–1.5)
BILIRUB SERPL-MCNC: 0.3 MG/DL (ref 0–1.2)
BUN SERPL-MCNC: 16 MG/DL (ref 8–23)
BUN/CREAT SERPL: 15.7 (ref 7–25)
CALCIUM SPEC-SCNC: 8.7 MG/DL (ref 8.6–10.5)
CHLORIDE SERPL-SCNC: 102 MMOL/L (ref 98–107)
CHOLEST SERPL-MCNC: 149 MG/DL (ref 0–200)
CO2 SERPL-SCNC: 26.8 MMOL/L (ref 22–29)
CREAT SERPL-MCNC: 1.02 MG/DL (ref 0.76–1.27)
DEPRECATED RDW RBC AUTO: 52.5 FL (ref 37–54)
EOSINOPHIL # BLD AUTO: 0.17 10*3/MM3 (ref 0–0.4)
EOSINOPHIL NFR BLD AUTO: 3 % (ref 0.3–6.2)
ERYTHROCYTE [DISTWIDTH] IN BLOOD BY AUTOMATED COUNT: 13.5 % (ref 12.3–15.4)
GFR SERPL CREATININE-BSD FRML MDRD: 72 ML/MIN/1.73
GFR SERPL CREATININE-BSD FRML MDRD: 88 ML/MIN/1.73
GLOBULIN UR ELPH-MCNC: 2.6 GM/DL
GLUCOSE SERPL-MCNC: 92 MG/DL (ref 65–99)
HCT VFR BLD AUTO: 42.1 % (ref 37.5–51)
HDLC SERPL-MCNC: 83 MG/DL (ref 40–60)
HGB BLD-MCNC: 14.3 G/DL (ref 13–17.7)
IMM GRANULOCYTES # BLD AUTO: 0.01 10*3/MM3 (ref 0–0.05)
IMM GRANULOCYTES NFR BLD AUTO: 0.2 % (ref 0–0.5)
LDLC SERPL CALC-MCNC: 58 MG/DL (ref 0–100)
LDLC/HDLC SERPL: 0.73 {RATIO}
LYMPHOCYTES # BLD AUTO: 1.72 10*3/MM3 (ref 0.7–3.1)
LYMPHOCYTES NFR BLD AUTO: 30 % (ref 19.6–45.3)
MCH RBC QN AUTO: 35.3 PG (ref 26.6–33)
MCHC RBC AUTO-ENTMCNC: 34 G/DL (ref 31.5–35.7)
MCV RBC AUTO: 104 FL (ref 79–97)
MONOCYTES # BLD AUTO: 0.5 10*3/MM3 (ref 0.1–0.9)
MONOCYTES NFR BLD AUTO: 8.7 % (ref 5–12)
NEUTROPHILS NFR BLD AUTO: 3.3 10*3/MM3 (ref 1.7–7)
NEUTROPHILS NFR BLD AUTO: 57.4 % (ref 42.7–76)
PLATELET # BLD AUTO: 127 10*3/MM3 (ref 140–450)
PMV BLD AUTO: 10.6 FL (ref 6–12)
POTASSIUM SERPL-SCNC: 4.5 MMOL/L (ref 3.5–5.2)
PROT SERPL-MCNC: 7 G/DL (ref 6–8.5)
PSA SERPL-MCNC: 0.55 NG/ML (ref 0–4)
RBC # BLD AUTO: 4.05 10*6/MM3 (ref 4.14–5.8)
SODIUM SERPL-SCNC: 136 MMOL/L (ref 136–145)
TESTOST SERPL-MCNC: 112 NG/DL (ref 193–740)
TRIGL SERPL-MCNC: 29 MG/DL (ref 0–150)
TSH SERPL DL<=0.05 MIU/L-ACNC: 3.4 UIU/ML (ref 0.27–4.2)
VIT B12 BLD-MCNC: 1845 PG/ML (ref 211–946)
VLDLC SERPL-MCNC: 8 MG/DL (ref 5–40)
WBC # BLD AUTO: 5.74 10*3/MM3 (ref 3.4–10.8)

## 2021-09-30 PROCEDURE — 84443 ASSAY THYROID STIM HORMONE: CPT

## 2021-09-30 PROCEDURE — 82607 VITAMIN B-12: CPT

## 2021-09-30 PROCEDURE — G0103 PSA SCREENING: HCPCS

## 2021-09-30 PROCEDURE — 80061 LIPID PANEL: CPT

## 2021-09-30 PROCEDURE — 82306 VITAMIN D 25 HYDROXY: CPT

## 2021-09-30 PROCEDURE — 95117 IMMUNOTHERAPY INJECTIONS: CPT | Performed by: FAMILY MEDICINE

## 2021-09-30 PROCEDURE — 85025 COMPLETE CBC W/AUTO DIFF WBC: CPT

## 2021-09-30 PROCEDURE — 80053 COMPREHEN METABOLIC PANEL: CPT

## 2021-09-30 PROCEDURE — 84403 ASSAY OF TOTAL TESTOSTERONE: CPT

## 2021-09-30 PROCEDURE — 36415 COLL VENOUS BLD VENIPUNCTURE: CPT

## 2021-09-30 PROCEDURE — 96372 THER/PROPH/DIAG INJ SC/IM: CPT | Performed by: FAMILY MEDICINE

## 2021-09-30 RX ORDER — ATORVASTATIN CALCIUM 20 MG/1
TABLET, FILM COATED ORAL
Qty: 90 TABLET | Refills: 0 | Status: SHIPPED | OUTPATIENT
Start: 2021-09-30 | End: 2022-01-03

## 2021-09-30 RX ADMIN — TESTOSTERONE CYPIONATE 200 MG: 200 INJECTION, SOLUTION INTRAMUSCULAR at 10:24

## 2021-10-21 ENCOUNTER — OFFICE VISIT (OUTPATIENT)
Dept: FAMILY MEDICINE CLINIC | Age: 71
End: 2021-10-21

## 2021-10-21 VITALS
HEART RATE: 76 BPM | HEIGHT: 69 IN | SYSTOLIC BLOOD PRESSURE: 156 MMHG | BODY MASS INDEX: 28.23 KG/M2 | WEIGHT: 190.6 LBS | TEMPERATURE: 97.9 F | OXYGEN SATURATION: 96 % | DIASTOLIC BLOOD PRESSURE: 97 MMHG

## 2021-10-21 DIAGNOSIS — R25.1 OCCASIONAL TREMORS: Primary | ICD-10-CM

## 2021-10-21 DIAGNOSIS — J30.9 ALLERGIC RHINITIS, UNSPECIFIED SEASONALITY, UNSPECIFIED TRIGGER: ICD-10-CM

## 2021-10-21 DIAGNOSIS — E34.9 TESTOSTERONE DEFICIENCY: ICD-10-CM

## 2021-10-21 DIAGNOSIS — F41.1 GENERALIZED ANXIETY DISORDER: ICD-10-CM

## 2021-10-21 PROCEDURE — 99213 OFFICE O/P EST LOW 20 MIN: CPT | Performed by: NURSE PRACTITIONER

## 2021-10-21 PROCEDURE — 96372 THER/PROPH/DIAG INJ SC/IM: CPT | Performed by: NURSE PRACTITIONER

## 2021-10-21 PROCEDURE — 95117 IMMUNOTHERAPY INJECTIONS: CPT | Performed by: NURSE PRACTITIONER

## 2021-10-21 RX ADMIN — TESTOSTERONE CYPIONATE 200 MG: 200 INJECTION, SOLUTION INTRAMUSCULAR at 14:12

## 2021-10-21 NOTE — PROGRESS NOTES
Chief Complaint  Cesario Dueñas presents to [facility] for Hyperlipidemia and Tremors    Subjective          History of Present Illness    Cesario is here today for follow up on hyperlipidemia. On atorvastatin. Labs 9/30/2021 WNL.  Also notes tremors that started last year. Thought to be consistent with essential tremor. Previously failed propranolol. Dr Portillo started on primidone. This made him have fatigue, more emotional. He stopped taking on advise of his psychiatrist. He has not seen neurologist. Normal B12, TSH labs last month.   Hx low testosterone. On testosterone injections. He did miss some doses due to previous provider leaving. Last level low at 112 last month.   He has felt emotional recently. He sees Dr Clive Singh psych for depression/anxiety. He is on gabapentin and xanax.     Review of Systems      No Known Allergies   History reviewed. No pertinent past medical history.  Current Outpatient Medications   Medication Sig Dispense Refill   • ALPRAZolam (XANAX) 0.5 MG tablet Take 1 tablet by mouth 3 (Three) Times a Day As Needed.     • atorvastatin (LIPITOR) 20 MG tablet TAKE ONE TABLET BY MOUTH DAILY 90 tablet 0   • cholecalciferol (VITAMIN D3) 25 MCG (1000 UT) tablet Take 1,000 Units by mouth Daily.     • gabapentin (NEURONTIN) 600 MG tablet Take 1 tablet by mouth 2 (two) times a day.     • levocetirizine (XYZAL) 5 MG tablet Take  by mouth.     • montelukast (SINGULAIR) 10 MG tablet Take 10 mg by mouth.     • multivitamin with minerals tablet tablet Take 1 tablet by mouth Daily.     • Omega 3-6-9 Fatty Acids (OMEGA DHA PO) Take  by mouth.     • sildenafil (VIAGRA) 100 MG tablet Take 1 tablet by mouth As Needed for Erectile Dysfunction. 15 tablet 0   • Testosterone Cypionate (DEPOTESTOTERONE CYPIONATE) 200 MG/ML injection Inject 1 mL into the appropriate muscle as directed by prescriber Every 14 (Fourteen) Days. 2 mL 0     Current Facility-Administered Medications  "  Medication Dose Route Frequency Provider Last Rate Last Admin   • Testosterone Cypionate (DEPOTESTOTERONE CYPIONATE) injection 200 mg  200 mg Intramuscular Q14 Days Chuck Portillo MD   200 mg at 21 1024     History reviewed. No pertinent surgical history.   Social History     Tobacco Use   • Smoking status: Former Smoker     Types: Cigarettes     Quit date:      Years since quittin.8   • Smokeless tobacco: Never Used   Vaping Use   • Vaping Use: Never used   Substance Use Topics   • Alcohol use: Not on file   • Drug use: Not on file     History reviewed. No pertinent family history.  Health Maintenance Due   Topic Date Due   • COLORECTAL CANCER SCREENING  Never done   • ZOSTER VACCINE (1 of 2) Never done   • Pneumococcal Vaccine 65+ (2 of 2 - PPSV23) 2020   • HEPATITIS C SCREENING  Never done   • ANNUAL WELLNESS VISIT  Never done   • INFLUENZA VACCINE  2021      Immunization History   Administered Date(s) Administered   • COVID-19 (MODERNA) 2021, 2021   • FLUAD TRI 65YR+ 10/21/2019, 10/09/2020   • Flu Vaccine Quad PF >18YRS 10/09/2020   • Hepatitis A 2018, 2018   • Pneumococcal Conjugate 13-Valent (PCV13) 10/21/2015   • Pneumococcal Polysaccharide (PPSV23) 2015   • Td 2012        Objective     Vitals:    10/21/21 1340   BP: 156/97   Pulse: 76   Temp: 97.9 °F (36.6 °C)   SpO2: 96%   Weight: 86.5 kg (190 lb 9.6 oz)   Height: 175.3 cm (69\")     Body mass index is 28.15 kg/m².     Physical Exam  Vitals reviewed.   Constitutional:       General: He is not in acute distress.     Appearance: Normal appearance. He is well-developed.   HENT:      Head: Normocephalic and atraumatic.   Cardiovascular:      Rate and Rhythm: Normal rate and regular rhythm.   Pulmonary:      Effort: Pulmonary effort is normal.      Breath sounds: Normal breath sounds.   Neurological:      Mental Status: He is alert and oriented to person, place, and time.   Psychiatric:         Mood " and Affect: Mood and affect normal.           Result Review :     The following data was reviewed by: HUNG Reynolds on 10/21/2021:          Vitamin D 25 Hydroxy (09/30/2021 08:37)  Lipid Panel (09/30/2021 08:37)  Testosterone (09/30/2021 08:37)  TSH (09/30/2021 08:37)  Vitamin B12 (09/30/2021 08:37)  CBC Auto Differential (09/30/2021 08:37)  Comprehensive Metabolic Panel (09/30/2021 08:37)  PSA Screen (09/30/2021 08:37)                  Assessment and Plan      Diagnoses and all orders for this visit:    1. Occasional tremors (Primary)  Comments:  Failed propranolol and primidone. Will refer to neuro for further evaluation  Orders:  -     Ambulatory Referral to Neurology    2. Testosterone deficiency  Comments:  Continue testosterone injections. Low levels may be contributing to emotions.     3. Generalized anxiety disorder  Comments:  He will continue to follow up with psychiatrist              Follow Up     Return for Next scheduled follow up.

## 2021-11-01 DIAGNOSIS — E34.9 TESTOSTERONE DEFICIENCY: ICD-10-CM

## 2021-11-03 RX ORDER — TESTOSTERONE CYPIONATE 200 MG/ML
INJECTION, SOLUTION INTRAMUSCULAR
Qty: 2 ML | Refills: 0 | Status: SHIPPED | OUTPATIENT
Start: 2021-11-03 | End: 2021-12-23 | Stop reason: SDUPTHER

## 2021-11-05 ENCOUNTER — CLINICAL SUPPORT (OUTPATIENT)
Dept: FAMILY MEDICINE CLINIC | Age: 71
End: 2021-11-05

## 2021-11-05 VITALS — DIASTOLIC BLOOD PRESSURE: 90 MMHG | SYSTOLIC BLOOD PRESSURE: 165 MMHG | HEART RATE: 82 BPM

## 2021-11-05 DIAGNOSIS — J30.9 ALLERGIC RHINITIS, UNSPECIFIED SEASONALITY, UNSPECIFIED TRIGGER: Primary | ICD-10-CM

## 2021-11-05 DIAGNOSIS — E34.9 TESTOSTERONE DEFICIENCY: ICD-10-CM

## 2021-11-05 PROCEDURE — 96372 THER/PROPH/DIAG INJ SC/IM: CPT | Performed by: FAMILY MEDICINE

## 2021-11-05 PROCEDURE — 95117 IMMUNOTHERAPY INJECTIONS: CPT | Performed by: FAMILY MEDICINE

## 2021-11-05 RX ADMIN — TESTOSTERONE CYPIONATE 200 MG: 200 INJECTION, SOLUTION INTRAMUSCULAR at 14:05

## 2021-11-10 ENCOUNTER — CLINICAL SUPPORT (OUTPATIENT)
Dept: FAMILY MEDICINE CLINIC | Age: 71
End: 2021-11-10

## 2021-11-10 DIAGNOSIS — J30.9 ALLERGIC RHINITIS, UNSPECIFIED SEASONALITY, UNSPECIFIED TRIGGER: Primary | ICD-10-CM

## 2021-11-10 PROCEDURE — 95117 IMMUNOTHERAPY INJECTIONS: CPT | Performed by: FAMILY MEDICINE

## 2021-11-12 ENCOUNTER — CLINICAL SUPPORT (OUTPATIENT)
Dept: FAMILY MEDICINE CLINIC | Age: 71
End: 2021-11-12

## 2021-11-12 DIAGNOSIS — Z23 NEED FOR INFLUENZA VACCINATION: Primary | ICD-10-CM

## 2021-11-12 PROCEDURE — G0008 ADMIN INFLUENZA VIRUS VAC: HCPCS | Performed by: FAMILY MEDICINE

## 2021-11-12 PROCEDURE — 90662 IIV NO PRSV INCREASED AG IM: CPT | Performed by: FAMILY MEDICINE

## 2021-11-15 ENCOUNTER — CLINICAL SUPPORT (OUTPATIENT)
Dept: FAMILY MEDICINE CLINIC | Age: 71
End: 2021-11-15

## 2021-11-15 DIAGNOSIS — J30.9 ALLERGIC RHINITIS, UNSPECIFIED SEASONALITY, UNSPECIFIED TRIGGER: Primary | ICD-10-CM

## 2021-11-15 PROCEDURE — 95117 IMMUNOTHERAPY INJECTIONS: CPT | Performed by: FAMILY MEDICINE

## 2021-11-22 ENCOUNTER — CLINICAL SUPPORT (OUTPATIENT)
Dept: FAMILY MEDICINE CLINIC | Age: 71
End: 2021-11-22

## 2021-11-22 DIAGNOSIS — N52.8 OTHER MALE ERECTILE DYSFUNCTION: ICD-10-CM

## 2021-11-22 DIAGNOSIS — J30.9 ALLERGIC RHINITIS, UNSPECIFIED SEASONALITY, UNSPECIFIED TRIGGER: Primary | ICD-10-CM

## 2021-11-22 PROCEDURE — 95117 IMMUNOTHERAPY INJECTIONS: CPT | Performed by: FAMILY MEDICINE

## 2021-11-22 PROCEDURE — 96372 THER/PROPH/DIAG INJ SC/IM: CPT | Performed by: FAMILY MEDICINE

## 2021-11-22 RX ADMIN — TESTOSTERONE CYPIONATE 200 MG: 200 INJECTION, SOLUTION INTRAMUSCULAR at 12:11

## 2021-12-03 ENCOUNTER — CLINICAL SUPPORT (OUTPATIENT)
Dept: FAMILY MEDICINE CLINIC | Age: 71
End: 2021-12-03

## 2021-12-03 DIAGNOSIS — Z23 ENCOUNTER FOR IMMUNIZATION: Primary | ICD-10-CM

## 2021-12-03 PROCEDURE — 95117 IMMUNOTHERAPY INJECTIONS: CPT | Performed by: FAMILY MEDICINE

## 2021-12-07 ENCOUNTER — CLINICAL SUPPORT (OUTPATIENT)
Dept: FAMILY MEDICINE CLINIC | Age: 71
End: 2021-12-07

## 2021-12-07 DIAGNOSIS — E34.9 TESTOSTERONE DEFICIENCY: ICD-10-CM

## 2021-12-07 DIAGNOSIS — J30.9 ALLERGIC RHINITIS, UNSPECIFIED SEASONALITY, UNSPECIFIED TRIGGER: Primary | ICD-10-CM

## 2021-12-07 PROCEDURE — 96372 THER/PROPH/DIAG INJ SC/IM: CPT | Performed by: FAMILY MEDICINE

## 2021-12-07 PROCEDURE — 95117 IMMUNOTHERAPY INJECTIONS: CPT | Performed by: FAMILY MEDICINE

## 2021-12-07 RX ADMIN — TESTOSTERONE CYPIONATE 200 MG: 200 INJECTION, SOLUTION INTRAMUSCULAR at 12:21

## 2021-12-10 ENCOUNTER — CLINICAL SUPPORT (OUTPATIENT)
Dept: FAMILY MEDICINE CLINIC | Age: 71
End: 2021-12-10

## 2021-12-10 DIAGNOSIS — Z23 ENCOUNTER FOR IMMUNIZATION: Primary | ICD-10-CM

## 2021-12-10 PROCEDURE — 91300 COVID-19 (PFIZER): CPT | Performed by: FAMILY MEDICINE

## 2021-12-10 PROCEDURE — 0003A COVID-19 (PFIZER): CPT | Performed by: FAMILY MEDICINE

## 2021-12-16 ENCOUNTER — HOSPITAL ENCOUNTER (EMERGENCY)
Facility: HOSPITAL | Age: 71
Discharge: HOME | End: 2021-12-16
Attending: EMERGENCY MEDICINE | Admitting: EMERGENCY MEDICINE

## 2021-12-16 VITALS
HEART RATE: 94 BPM | WEIGHT: 187.17 LBS | OXYGEN SATURATION: 96 % | DIASTOLIC BLOOD PRESSURE: 84 MMHG | RESPIRATION RATE: 16 BRPM | HEIGHT: 69 IN | TEMPERATURE: 98.4 F | BODY MASS INDEX: 27.72 KG/M2 | SYSTOLIC BLOOD PRESSURE: 101 MMHG

## 2021-12-16 DIAGNOSIS — F10.920 ALCOHOLIC INTOXICATION WITHOUT COMPLICATION (HCC): Primary | ICD-10-CM

## 2021-12-16 DIAGNOSIS — R45.89 INEFFECTIVE COPING: ICD-10-CM

## 2021-12-16 LAB
ALBUMIN SERPL-MCNC: 4.7 G/DL (ref 3.5–5.2)
ALBUMIN/GLOB SERPL: 1.7 G/DL
ALP SERPL-CCNC: 73 U/L (ref 39–117)
ALT SERPL W P-5'-P-CCNC: 62 U/L (ref 1–41)
AMPHET+METHAMPHET UR QL: NEGATIVE
ANION GAP SERPL CALCULATED.3IONS-SCNC: 18.9 MMOL/L (ref 5–15)
APAP SERPL-MCNC: <5 MCG/ML (ref 0–30)
AST SERPL-CCNC: 105 U/L (ref 1–40)
BARBITURATES UR QL SCN: NEGATIVE
BASOPHILS # BLD AUTO: 0.07 10*3/MM3 (ref 0–0.2)
BASOPHILS NFR BLD AUTO: 1.1 % (ref 0–1.5)
BENZODIAZ UR QL SCN: NEGATIVE
BILIRUB SERPL-MCNC: 0.3 MG/DL (ref 0–1.2)
BUN SERPL-MCNC: 8 MG/DL (ref 8–23)
BUN/CREAT SERPL: 7.8 (ref 7–25)
CALCIUM SPEC-SCNC: 9.2 MG/DL (ref 8.6–10.5)
CANNABINOIDS SERPL QL: NEGATIVE
CHLORIDE SERPL-SCNC: 105 MMOL/L (ref 98–107)
CO2 SERPL-SCNC: 22.1 MMOL/L (ref 22–29)
COCAINE UR QL: NEGATIVE
CREAT SERPL-MCNC: 1.03 MG/DL (ref 0.76–1.27)
DEPRECATED RDW RBC AUTO: 51.8 FL (ref 37–54)
EOSINOPHIL # BLD AUTO: 0.06 10*3/MM3 (ref 0–0.4)
EOSINOPHIL NFR BLD AUTO: 0.9 % (ref 0.3–6.2)
ERYTHROCYTE [DISTWIDTH] IN BLOOD BY AUTOMATED COUNT: 13.6 % (ref 12.3–15.4)
ETHANOL BLD-MCNC: 163 MG/DL (ref 0–10)
ETHANOL BLD-MCNC: 328 MG/DL (ref 0–10)
ETHANOL BLD-MCNC: 69 MG/DL (ref 0–10)
ETHANOL UR QL: 0.07 %
ETHANOL UR QL: 0.16 %
ETHANOL UR QL: 0.33 %
GFR SERPL CREATININE-BSD FRML MDRD: 71 ML/MIN/1.73
GLOBULIN UR ELPH-MCNC: 2.8 GM/DL
GLUCOSE SERPL-MCNC: 81 MG/DL (ref 65–99)
HCT VFR BLD AUTO: 46.7 % (ref 37.5–51)
HGB BLD-MCNC: 16.3 G/DL (ref 13–17.7)
HOLD SPECIMEN: NORMAL
HOLD SPECIMEN: NORMAL
IMM GRANULOCYTES # BLD AUTO: 0.02 10*3/MM3 (ref 0–0.05)
IMM GRANULOCYTES NFR BLD AUTO: 0.3 % (ref 0–0.5)
LYMPHOCYTES # BLD AUTO: 2.74 10*3/MM3 (ref 0.7–3.1)
LYMPHOCYTES NFR BLD AUTO: 42.3 % (ref 19.6–45.3)
MCH RBC QN AUTO: 35.6 PG (ref 26.6–33)
MCHC RBC AUTO-ENTMCNC: 34.9 G/DL (ref 31.5–35.7)
MCV RBC AUTO: 102 FL (ref 79–97)
METHADONE UR QL SCN: NEGATIVE
MONOCYTES # BLD AUTO: 0.37 10*3/MM3 (ref 0.1–0.9)
MONOCYTES NFR BLD AUTO: 5.7 % (ref 5–12)
NEUTROPHILS NFR BLD AUTO: 3.21 10*3/MM3 (ref 1.7–7)
NEUTROPHILS NFR BLD AUTO: 49.7 % (ref 42.7–76)
NRBC BLD AUTO-RTO: 0 /100 WBC (ref 0–0.2)
OPIATES UR QL: NEGATIVE
OXYCODONE UR QL SCN: NEGATIVE
PLATELET # BLD AUTO: 173 10*3/MM3 (ref 140–450)
PMV BLD AUTO: 11 FL (ref 6–12)
POTASSIUM SERPL-SCNC: 4.1 MMOL/L (ref 3.5–5.2)
PROT SERPL-MCNC: 7.5 G/DL (ref 6–8.5)
RBC # BLD AUTO: 4.58 10*6/MM3 (ref 4.14–5.8)
SALICYLATES SERPL-MCNC: <0.3 MG/DL
SARS-COV-2 RNA RESP QL NAA+PROBE: NOT DETECTED
SODIUM SERPL-SCNC: 146 MMOL/L (ref 136–145)
WBC NRBC COR # BLD: 6.47 10*3/MM3 (ref 3.4–10.8)
WHOLE BLOOD HOLD SPECIMEN: NORMAL
WHOLE BLOOD HOLD SPECIMEN: NORMAL

## 2021-12-16 PROCEDURE — U0005 INFEC AGEN DETEC AMPLI PROBE: HCPCS | Performed by: NURSE PRACTITIONER

## 2021-12-16 PROCEDURE — 82077 ASSAY SPEC XCP UR&BREATH IA: CPT | Performed by: NURSE PRACTITIONER

## 2021-12-16 PROCEDURE — 80307 DRUG TEST PRSMV CHEM ANLYZR: CPT | Performed by: EMERGENCY MEDICINE

## 2021-12-16 PROCEDURE — C9803 HOPD COVID-19 SPEC COLLECT: HCPCS

## 2021-12-16 PROCEDURE — U0003 INFECTIOUS AGENT DETECTION BY NUCLEIC ACID (DNA OR RNA); SEVERE ACUTE RESPIRATORY SYNDROME CORONAVIRUS 2 (SARS-COV-2) (CORONAVIRUS DISEASE [COVID-19]), AMPLIFIED PROBE TECHNIQUE, MAKING USE OF HIGH THROUGHPUT TECHNOLOGIES AS DESCRIBED BY CMS-2020-01-R: HCPCS | Performed by: NURSE PRACTITIONER

## 2021-12-16 PROCEDURE — 80179 DRUG ASSAY SALICYLATE: CPT | Performed by: NURSE PRACTITIONER

## 2021-12-16 PROCEDURE — 36415 COLL VENOUS BLD VENIPUNCTURE: CPT

## 2021-12-16 PROCEDURE — 80053 COMPREHEN METABOLIC PANEL: CPT | Performed by: NURSE PRACTITIONER

## 2021-12-16 PROCEDURE — 85025 COMPLETE CBC W/AUTO DIFF WBC: CPT | Performed by: NURSE PRACTITIONER

## 2021-12-16 PROCEDURE — 99283 EMERGENCY DEPT VISIT LOW MDM: CPT

## 2021-12-16 PROCEDURE — 82077 ASSAY SPEC XCP UR&BREATH IA: CPT

## 2021-12-16 PROCEDURE — 80143 DRUG ASSAY ACETAMINOPHEN: CPT | Performed by: NURSE PRACTITIONER

## 2021-12-16 RX ORDER — ALPRAZOLAM 0.25 MG/1
0.5 TABLET ORAL ONCE
Status: COMPLETED | OUTPATIENT
Start: 2021-12-16 | End: 2021-12-16

## 2021-12-16 RX ADMIN — ALPRAZOLAM 0.5 MG: 0.25 TABLET ORAL at 03:08

## 2021-12-16 NOTE — DISCHARGE INSTRUCTIONS
Follow-up with your PCP, call APRN for reevaluation recheck of blood pressure.    Follow-up with Dr. Singh your psychiatrist for further evaluation as already scheduled.    Continue your Xanax.    Follow up with any outpatient detox center regarding your alcohol abuse for treatment    Return the emergency department immediately for thoughts of self-harm, thoughts of harming others, fever, cough, chest pain, shortness of breath, new change or worsening symptoms.

## 2021-12-16 NOTE — ED PROVIDER NOTES
"Subjective   Pt reports he has \"issues\" with his ex and wants to shoot himself.       History provided by:  Patient  Suicidal  Presenting symptoms: suicidal thoughts and suicidal threats    Patient accompanied by:  Family member  Degree of incapacity (severity):  Severe  Onset quality:  Sudden  Duration:  3 hours  Timing:  Constant  Progression:  Unchanged  Chronicity:  New  Context: alcohol use    Treatment compliance:  Unable to specify  Relieved by:  Nothing  Worsened by:  Nothing  Ineffective treatments:  None tried  Associated symptoms: no abdominal pain, no anhedonia, no anxiety, no appetite change, no chest pain, no decreased need for sleep, not distractible, no euphoric mood, no fatigue, no feelings of worthlessness, no headaches, no hypersomnia, no hyperventilation, no insomnia, no irritability, no poor judgment and no weight change        Review of Systems   Constitutional: Negative for appetite change, chills, fatigue, fever and irritability.   HENT: Negative for congestion, ear pain and sore throat.    Eyes: Negative for pain.   Respiratory: Negative for cough, chest tightness and shortness of breath.    Cardiovascular: Negative for chest pain.   Gastrointestinal: Negative for abdominal pain, diarrhea, nausea and vomiting.   Genitourinary: Negative for flank pain and hematuria.   Musculoskeletal: Negative for joint swelling.   Skin: Negative for pallor.   Neurological: Negative for seizures and headaches.   Psychiatric/Behavioral: Positive for suicidal ideas. The patient is not nervous/anxious and does not have insomnia.    All other systems reviewed and are negative.      Past Medical History:   Diagnosis Date   • Hypertension        No Known Allergies    History reviewed. No pertinent surgical history.    History reviewed. No pertinent family history.    Social History     Socioeconomic History   • Marital status:    Tobacco Use   • Smoking status: Former Smoker     Types: Cigarettes     Quit " date:      Years since quittin.9   • Smokeless tobacco: Never Used   Vaping Use   • Vaping Use: Never used   Substance and Sexual Activity   • Alcohol use: Yes     Comment: whiskey daily   • Drug use: Never   • Sexual activity: Defer           Objective   Physical Exam  Vitals and nursing note reviewed.   Constitutional:       General: He is not in acute distress.     Appearance: Normal appearance. He is not toxic-appearing.   HENT:      Head: Normocephalic and atraumatic.      Mouth/Throat:      Mouth: Mucous membranes are moist.   Eyes:      General: No scleral icterus.  Cardiovascular:      Rate and Rhythm: Normal rate and regular rhythm.      Pulses: Normal pulses.      Heart sounds: Normal heart sounds.   Pulmonary:      Effort: Pulmonary effort is normal. No respiratory distress.      Breath sounds: Normal breath sounds.   Abdominal:      General: Abdomen is flat.      Palpations: Abdomen is soft.      Tenderness: There is no abdominal tenderness.   Musculoskeletal:         General: Normal range of motion.      Cervical back: Normal range of motion and neck supple.   Skin:     General: Skin is warm and dry.   Neurological:      Mental Status: He is alert and oriented to person, place, and time. Mental status is at baseline.   Psychiatric:         Attention and Perception: Attention normal.         Mood and Affect: Mood normal.         Speech: Speech is slurred.         Behavior: Behavior normal.         Thought Content: Thought content includes suicidal ideation.         Cognition and Memory: Cognition normal.         Judgment: Judgment normal.         Procedures           ED Course  ED Course as of 21 0806   Thu Dec 16, 2021   0732 Report to HUNG Asher. Awaiting repeat Ethanol level at 0900 for sober evaluation.  [CM]      ED Course User Index  [CM] Jame Bates APRN                                                 [facility]  Number of Diagnoses or Management Options      Final  diagnoses:   Alcoholic intoxication without complication (HCC)       ED Disposition  ED Disposition     None          No follow-up provider specified.       Medication List      No changes were made to your prescriptions during this visit.          Jame Bates, APRN  12/16/21 0899

## 2021-12-16 NOTE — SIGNIFICANT NOTE
"Pt presented to [facility] ED for psychiatric evaluation. Upon arrival to ED, pt had an elevated BAL.  Once pt's BAL was under .08 this SW evaluated him.  Pt reports he \"drank too much\" last night and called a friend. He reports he told friend he wanted to shoot himself.  Pt reports he does not have any suicidal ideation. He said that because he was drunk.  Pt reports he and his wife  5 years ago. He found out 6 months ago that she had been cheating on him while they were .  Pt currently lives alone. He lives on his brother's farm. He indicates his brother, sister in law, niece, and nephew are all his source of support.  Pt denies SI/HI/AVH.  He attempted OD in 2007 and had a stay at [facility]. He reports at the time he was diagnosed with Bipolar disorder, but his current psychiatrist indicated that he was misdiagnosed. Pt currently takes gabapentin and Xanex for anxiety.  Pt has an appointment with his psychiatrist Clive Ellis off [facility] in the next 2 weeks.  He does not have a therapist, but reports he does not feel he needs one.  Pt reports he drinks half a pint of alcohol a night. SW asked if pt would like referrals for alcohol abuse treatment. Pt declined at this time. He reports he knows he has a problem, but he wants to \"try to kick it on [my] own.\"   SW provided pt with substance abuse resources and behavioral health crisis resources.  "

## 2021-12-16 NOTE — ED PROVIDER NOTES
0730 assumed care.  Patient evaluated in the ER for thoughts of wanting to shoot himself.  Patient ported he is having issues with his exsignificant other.  Patient takes Xanax at home.  Patient currently has a sitter at the bedside for safety.  Patient is to repeat alcohol at 9 AM.    Physical exam:  Resting quietly with eyes closed. HR 98, RR 16. Even, unlabored respirations.     1439 patient's alcohol 0.06 after recheck.  Patient awake and alert.  Cooperative for exam.  Patient denies thoughts of self-harm or homicidal ideation.  Respirations are even and unlabored.  No acute distress. Patient has insight into unacceptable behavior and alcohol use. He requests discharge.     1439 spoke with Selene  who will see the patient for evaluation. Repeat alcohol 0.06. patient is clinically sober.     1515 spoke with Selene  call patient.  He states that he sees Clive Singh, psychiatrist [facility] and has a follow-up appointment already scheduled.  Resources given for outpatient alcohol detox and community resources by Selene.  Patient again declines thoughts of suicide.    1523 I have discussed with the patient the emergency department work-up including all test results, the differential diagnosis, the diagnosis given in the emergency department, and the absolute need for follow-up with the primary care physician.  I have discussed all prescriptions and treatments.  I have discussed the possible worsening of their condition, and also discussed criteria for return to the emergency department which includes but is not limited to worsening condition or lack of improvement of the current condition.  I have informed them that a normal work-up evaluation in the emergency department and/or discharge from the emergency department, does not signify that no disease process is present, but simply that there is no emergent indication for admission.  All questions were answered at this time.  I informed them  that significant pathology may still be present, but they may need further work-up, and that this further investigation should be undertaken by the primary care physician. He voiced his/her understanding.  Patient is agreeable to plan for discharge.    Discharge instructions include:         Follow-up with your PCP, call APRN for reevaluation recheck of blood pressure.    Follow-up with Dr. Singh your psychiatrist for further evaluation as already scheduled.    Continue your Xanax.    Follow up with any outpatient detox center regarding your alcohol abuse for treatment    Return the emergency department immediately for thoughts of self-harm, thoughts of harming others, fever, cough, chest pain, shortness of breath, new change or worsening symptoms.       Eliana Harper, APRN  12/16/21 1524       Eliana Harper, APRN  12/16/21 1527

## 2021-12-20 ENCOUNTER — CLINICAL SUPPORT (OUTPATIENT)
Dept: FAMILY MEDICINE CLINIC | Age: 71
End: 2021-12-20

## 2021-12-20 VITALS — DIASTOLIC BLOOD PRESSURE: 91 MMHG | HEART RATE: 90 BPM | SYSTOLIC BLOOD PRESSURE: 160 MMHG

## 2021-12-20 DIAGNOSIS — J30.9 ALLERGIC RHINITIS, UNSPECIFIED SEASONALITY, UNSPECIFIED TRIGGER: Primary | ICD-10-CM

## 2021-12-20 DIAGNOSIS — E34.9 TESTOSTERONE DEFICIENCY: ICD-10-CM

## 2021-12-20 DIAGNOSIS — E29.1 TESTICULAR HYPOFUNCTION: ICD-10-CM

## 2021-12-20 PROCEDURE — 95117 IMMUNOTHERAPY INJECTIONS: CPT | Performed by: FAMILY MEDICINE

## 2021-12-20 PROCEDURE — 96372 THER/PROPH/DIAG INJ SC/IM: CPT | Performed by: FAMILY MEDICINE

## 2021-12-20 RX ADMIN — TESTOSTERONE CYPIONATE 200 MG: 200 INJECTION, SOLUTION INTRAMUSCULAR at 13:45

## 2021-12-23 ENCOUNTER — OFFICE VISIT (OUTPATIENT)
Dept: FAMILY MEDICINE CLINIC | Age: 71
End: 2021-12-23

## 2021-12-23 VITALS
OXYGEN SATURATION: 98 % | BODY MASS INDEX: 27.34 KG/M2 | SYSTOLIC BLOOD PRESSURE: 144 MMHG | WEIGHT: 184.6 LBS | HEART RATE: 116 BPM | HEIGHT: 69 IN | DIASTOLIC BLOOD PRESSURE: 79 MMHG

## 2021-12-23 DIAGNOSIS — G25.2 OTHER SPECIFIED FORMS OF TREMOR: ICD-10-CM

## 2021-12-23 DIAGNOSIS — E78.2 MIXED HYPERLIPIDEMIA: ICD-10-CM

## 2021-12-23 DIAGNOSIS — I10 PRIMARY HYPERTENSION: Primary | ICD-10-CM

## 2021-12-23 DIAGNOSIS — F41.1 GENERALIZED ANXIETY DISORDER: ICD-10-CM

## 2021-12-23 DIAGNOSIS — E34.9 TESTOSTERONE DEFICIENCY: ICD-10-CM

## 2021-12-23 PROCEDURE — 99214 OFFICE O/P EST MOD 30 MIN: CPT | Performed by: NURSE PRACTITIONER

## 2021-12-23 RX ORDER — LISINOPRIL 5 MG/1
5 TABLET ORAL DAILY
Qty: 90 TABLET | Refills: 0 | Status: SHIPPED | OUTPATIENT
Start: 2021-12-23 | End: 2022-03-04 | Stop reason: SDUPTHER

## 2021-12-23 RX ORDER — LAMOTRIGINE 200 MG/1
200 TABLET ORAL
COMMUNITY
End: 2022-03-04

## 2021-12-23 RX ORDER — TESTOSTERONE CYPIONATE 200 MG/ML
INJECTION, SOLUTION INTRAMUSCULAR
Qty: 2 ML | Refills: 0 | Status: SHIPPED | OUTPATIENT
Start: 2021-12-23 | End: 2022-01-14

## 2021-12-23 NOTE — PROGRESS NOTES
Chief Complaint  Cesario Dueñas presents to [facility] for Follow-up (3 mo f/u. Pt states bp has been elevated)    Subjective          History of Present Illness  Cesario is here today for follow up on chronic issues.  He has hx of hyperlipidemia. On atorvastatin. Labs 9/30/2021 WNL.  Also notes tremors that started last year. Thought to be consistent with essential tremor. Previously failed propranolol. Dr Portillo started on primidone. This made him have fatigue, more emotional. He stopped taking on advise of his psychiatrist. Normal B12, TSH. Has appointment scheduled next month with neurology.   Hx low testosterone. On testosterone injections. He did miss some doses due to previous provider leaving. Last level low at 112 last month.   He sees Dr Clive Singh psych for depression/anxiety. He is on gabapentin and xanax. Had recent ER visit for suicidal thoughts, alcohol abuse. His blood pressure was elevated during ER visit. Denies suicidal ideation today. Feels doing well overall. Has never been on medication for his blood pressure.     Review of Systems      No Known Allergies   Past Medical History:   Diagnosis Date   • Hypertension      Current Outpatient Medications   Medication Sig Dispense Refill   • ALPRAZolam (XANAX) 0.5 MG tablet Take 1 tablet by mouth 3 (Three) Times a Day As Needed.     • atorvastatin (LIPITOR) 20 MG tablet TAKE ONE TABLET BY MOUTH DAILY 90 tablet 0   • cholecalciferol (VITAMIN D3) 25 MCG (1000 UT) tablet Take 1,000 Units by mouth Daily.     • gabapentin (NEURONTIN) 600 MG tablet Take 1 tablet by mouth 2 (two) times a day.     • lamoTRIgine (LaMICtal) 200 MG tablet Take 200 mg by mouth.     • levocetirizine (XYZAL) 5 MG tablet Take  by mouth.     • montelukast (SINGULAIR) 10 MG tablet Take 10 mg by mouth.     • multivitamin with minerals tablet tablet Take 1 tablet by mouth Daily.     • Omega 3-6-9 Fatty Acids (OMEGA DHA PO) Take  by mouth.     •  "sildenafil (VIAGRA) 100 MG tablet Take 1 tablet by mouth As Needed for Erectile Dysfunction. 15 tablet 0   • Testosterone Cypionate (DEPOTESTOTERONE CYPIONATE) 200 MG/ML injection Inject 1 ml into muscle as directed every 14 days 2 mL 0   • lisinopril (PRINIVIL,ZESTRIL) 5 MG tablet Take 1 tablet by mouth Daily for 90 days. 90 tablet 0     No current facility-administered medications for this visit.     History reviewed. No pertinent surgical history.   Social History     Tobacco Use   • Smoking status: Former Smoker     Types: Cigarettes     Quit date:      Years since quittin.   • Smokeless tobacco: Never Used   Vaping Use   • Vaping Use: Never used   Substance Use Topics   • Alcohol use: Yes     Comment: whiskey daily   • Drug use: Never     History reviewed. No pertinent family history.  Health Maintenance Due   Topic Date Due   • COLORECTAL CANCER SCREENING  Never done   • HEPATITIS C SCREENING  Never done   • ANNUAL WELLNESS VISIT  Never done      Immunization History   Administered Date(s) Administered   • COVID-19 (MODERNA) 1st, 2nd, 3rd Dose Only 2021, 2021   • COVID-19 (PFIZER) 12/10/2021   • FLUAD TRI 65YR+ 10/21/2019, 10/09/2020   • Flu Vaccine Quad PF >18YRS 10/09/2020   • Fluzone High-Dose 65+yrs 2021   • Hepatitis A 2018, 2018   • Pneumococcal Conjugate 13-Valent (PCV13) 10/21/2015   • Pneumococcal Polysaccharide (PPSV23) 2015   • Td 2012        Objective     Vitals:    21 1314   BP: 144/79   BP Location: Left arm   Patient Position: Sitting   Cuff Size: Adult   Pulse: 116   SpO2: 98%   Weight: 83.7 kg (184 lb 9.6 oz)   Height: 175.3 cm (69\")     Body mass index is 27.26 kg/m².     Physical Exam  Vitals reviewed.   Constitutional:       General: He is not in acute distress.     Appearance: Normal appearance. He is well-developed.   HENT:      Head: Normocephalic and atraumatic.   Cardiovascular:      Rate and Rhythm: Normal rate and regular " rhythm.   Pulmonary:      Effort: Pulmonary effort is normal.      Breath sounds: Normal breath sounds.   Neurological:      Mental Status: He is alert and oriented to person, place, and time.   Psychiatric:         Mood and Affect: Mood and affect normal.           Result Review :     The following data was reviewed by: HUNG Reynolds on 12/23/2021:          Urine Drug Screen - Urine, Clean Catch (12/16/2021 01:35)  POC Urine Drug Screen [facility] [employer] (09/22/2021 16:12)  Ethanol (12/16/2021 13:44)  Ethanol (12/16/2021 09:26)  COVID-19,CEPHEID/MEGHANA,COR/CARLOS/PAD/PRAKASH IN-HOUSE(OR EMERGENT/ADD-ON),NP SWAB IN TRANSPORT MEDIA 3-4 HR TAT, RT-PCR - Swab, Nasopharynx (12/16/2021 02:08)  Urine Drug Screen - Urine, Clean Catch (12/16/2021 01:35)  Salicylate Level (12/16/2021 00:58)  Acetaminophen Level (12/16/2021 00:58)  Comprehensive Metabolic Panel (12/16/2021 00:58)  CBC & Differential (12/16/2021 00:58)  Ethanol (12/16/2021 00:58)                  Assessment and Plan      Diagnoses and all orders for this visit:    1. Primary hypertension (Primary)  Comments:  Will start lisinopril 5 mg daily. BP log. F/U 3 months or sooner as needed.   Orders:  -     lisinopril (PRINIVIL,ZESTRIL) 5 MG tablet; Take 1 tablet by mouth Daily for 90 days.  Dispense: 90 tablet; Refill: 0    2. Testosterone deficiency  -     Testosterone Cypionate (DEPOTESTOTERONE CYPIONATE) 200 MG/ML injection; Inject 1 ml into muscle as directed every 14 days  Dispense: 2 mL; Refill: 0    3. Generalized anxiety disorder  Comments:  Call Dr Singh to follow up and let him know about recent ER visit. Resources given to pt regarding alcohol treatment. ER precautions given.     4. Other specified forms of tremor  Comments:  Keep scheduled neurology appt    5. Mixed hyperlipidemia  Comments:  Continue atorvastatin.               Follow Up     Return in about 3 months (around 3/23/2022) for Recheck.

## 2022-01-03 RX ORDER — ATORVASTATIN CALCIUM 20 MG/1
TABLET, FILM COATED ORAL
Qty: 90 TABLET | Refills: 1 | Status: SHIPPED | OUTPATIENT
Start: 2022-01-03 | End: 2022-03-04 | Stop reason: SDUPTHER

## 2022-01-14 ENCOUNTER — CLINICAL SUPPORT (OUTPATIENT)
Dept: FAMILY MEDICINE CLINIC | Age: 72
End: 2022-01-14

## 2022-01-14 DIAGNOSIS — E34.9 TESTOSTERONE DEFICIENCY: ICD-10-CM

## 2022-01-14 DIAGNOSIS — J30.9 ALLERGIC RHINITIS, UNSPECIFIED SEASONALITY, UNSPECIFIED TRIGGER: Primary | ICD-10-CM

## 2022-01-14 PROCEDURE — 95117 IMMUNOTHERAPY INJECTIONS: CPT | Performed by: FAMILY MEDICINE

## 2022-01-14 RX ORDER — TESTOSTERONE CYPIONATE 200 MG/ML
200 INJECTION, SOLUTION INTRAMUSCULAR
Status: DISCONTINUED | OUTPATIENT
Start: 2022-01-14 | End: 2022-03-04

## 2022-01-14 RX ADMIN — TESTOSTERONE CYPIONATE 200 MG: 200 INJECTION, SOLUTION INTRAMUSCULAR at 14:39

## 2022-01-14 NOTE — PROGRESS NOTES
I have reviewed the notes, assessments, and/or procedures performed by Rossana Coker LPN, and I concur with her documentation of Cesario Dueñas.

## 2022-01-19 ENCOUNTER — CLINICAL SUPPORT (OUTPATIENT)
Dept: FAMILY MEDICINE CLINIC | Age: 72
End: 2022-01-19

## 2022-01-19 DIAGNOSIS — J30.9 ALLERGIC RHINITIS, UNSPECIFIED SEASONALITY, UNSPECIFIED TRIGGER: Primary | ICD-10-CM

## 2022-01-19 PROCEDURE — 95117 IMMUNOTHERAPY INJECTIONS: CPT | Performed by: FAMILY MEDICINE

## 2022-02-02 ENCOUNTER — CLINICAL SUPPORT (OUTPATIENT)
Dept: FAMILY MEDICINE CLINIC | Age: 72
End: 2022-02-02

## 2022-02-02 VITALS — DIASTOLIC BLOOD PRESSURE: 85 MMHG | SYSTOLIC BLOOD PRESSURE: 149 MMHG | HEART RATE: 72 BPM

## 2022-02-02 DIAGNOSIS — E34.9 TESTOSTERONE DEFICIENCY: ICD-10-CM

## 2022-02-02 DIAGNOSIS — J30.9 ALLERGIC RHINITIS, UNSPECIFIED SEASONALITY, UNSPECIFIED TRIGGER: Primary | ICD-10-CM

## 2022-02-02 PROCEDURE — 95117 IMMUNOTHERAPY INJECTIONS: CPT | Performed by: FAMILY MEDICINE

## 2022-02-02 RX ADMIN — TESTOSTERONE CYPIONATE 200 MG: 200 INJECTION, SOLUTION INTRAMUSCULAR at 12:27

## 2022-02-07 ENCOUNTER — CLINICAL SUPPORT (OUTPATIENT)
Dept: FAMILY MEDICINE CLINIC | Age: 72
End: 2022-02-07

## 2022-02-07 DIAGNOSIS — J30.9 ALLERGIC RHINITIS, UNSPECIFIED SEASONALITY, UNSPECIFIED TRIGGER: Primary | ICD-10-CM

## 2022-02-07 PROCEDURE — 95117 IMMUNOTHERAPY INJECTIONS: CPT | Performed by: FAMILY MEDICINE

## 2022-02-14 DIAGNOSIS — E34.9 TESTOSTERONE DEFICIENCY: ICD-10-CM

## 2022-02-14 RX ORDER — TESTOSTERONE CYPIONATE 200 MG/ML
INJECTION, SOLUTION INTRAMUSCULAR
Qty: 2 ML | Refills: 0 | Status: SHIPPED | OUTPATIENT
Start: 2022-02-14 | End: 2022-03-04 | Stop reason: SDUPTHER

## 2022-02-24 ENCOUNTER — CLINICAL SUPPORT (OUTPATIENT)
Dept: FAMILY MEDICINE CLINIC | Age: 72
End: 2022-02-24

## 2022-02-24 VITALS — SYSTOLIC BLOOD PRESSURE: 158 MMHG | DIASTOLIC BLOOD PRESSURE: 82 MMHG | HEART RATE: 83 BPM

## 2022-02-24 DIAGNOSIS — E34.9 TESTOSTERONE DEFICIENCY: ICD-10-CM

## 2022-02-24 DIAGNOSIS — J30.9 ALLERGIC RHINITIS, UNSPECIFIED SEASONALITY, UNSPECIFIED TRIGGER: Primary | ICD-10-CM

## 2022-02-24 PROCEDURE — 96372 THER/PROPH/DIAG INJ SC/IM: CPT | Performed by: NURSE PRACTITIONER

## 2022-02-24 PROCEDURE — 95117 IMMUNOTHERAPY INJECTIONS: CPT | Performed by: NURSE PRACTITIONER

## 2022-02-24 RX ADMIN — TESTOSTERONE CYPIONATE 200 MG: 200 INJECTION, SOLUTION INTRAMUSCULAR at 13:27

## 2022-03-04 ENCOUNTER — OFFICE VISIT (OUTPATIENT)
Dept: FAMILY MEDICINE CLINIC | Age: 72
End: 2022-03-04

## 2022-03-04 VITALS
WEIGHT: 195 LBS | DIASTOLIC BLOOD PRESSURE: 84 MMHG | HEIGHT: 69 IN | HEART RATE: 80 BPM | OXYGEN SATURATION: 100 % | BODY MASS INDEX: 28.88 KG/M2 | TEMPERATURE: 98 F | SYSTOLIC BLOOD PRESSURE: 149 MMHG

## 2022-03-04 DIAGNOSIS — E34.9 TESTOSTERONE DEFICIENCY: ICD-10-CM

## 2022-03-04 DIAGNOSIS — Z12.11 SCREENING FOR COLORECTAL CANCER: ICD-10-CM

## 2022-03-04 DIAGNOSIS — J30.9 ALLERGIC RHINITIS, UNSPECIFIED SEASONALITY, UNSPECIFIED TRIGGER: ICD-10-CM

## 2022-03-04 DIAGNOSIS — Z13.6 SCREENING FOR CARDIOVASCULAR CONDITION: ICD-10-CM

## 2022-03-04 DIAGNOSIS — E55.9 VITAMIN D DEFICIENCY: ICD-10-CM

## 2022-03-04 DIAGNOSIS — Z11.59 SCREENING FOR VIRAL DISEASE: ICD-10-CM

## 2022-03-04 DIAGNOSIS — G25.2 OTHER SPECIFIED FORMS OF TREMOR: ICD-10-CM

## 2022-03-04 DIAGNOSIS — E53.8 VITAMIN B12 DEFICIENCY: ICD-10-CM

## 2022-03-04 DIAGNOSIS — Z12.12 SCREENING FOR COLORECTAL CANCER: ICD-10-CM

## 2022-03-04 DIAGNOSIS — I10 PRIMARY HYPERTENSION: Primary | ICD-10-CM

## 2022-03-04 DIAGNOSIS — E78.2 MIXED HYPERLIPIDEMIA: ICD-10-CM

## 2022-03-04 PROCEDURE — 99214 OFFICE O/P EST MOD 30 MIN: CPT | Performed by: NURSE PRACTITIONER

## 2022-03-04 PROCEDURE — 95117 IMMUNOTHERAPY INJECTIONS: CPT | Performed by: NURSE PRACTITIONER

## 2022-03-04 RX ORDER — TESTOSTERONE CYPIONATE 200 MG/ML
INJECTION, SOLUTION INTRAMUSCULAR
Qty: 2 ML | Refills: 0 | Status: SHIPPED | OUTPATIENT
Start: 2022-03-04 | End: 2022-03-18 | Stop reason: SDUPTHER

## 2022-03-04 RX ORDER — ATORVASTATIN CALCIUM 20 MG/1
20 TABLET, FILM COATED ORAL DAILY
Qty: 90 TABLET | Refills: 0 | Status: SHIPPED | OUTPATIENT
Start: 2022-03-04 | End: 2022-06-06 | Stop reason: SDUPTHER

## 2022-03-04 RX ORDER — LISINOPRIL 10 MG/1
10 TABLET ORAL DAILY
Qty: 90 TABLET | Refills: 0 | Status: SHIPPED | OUTPATIENT
Start: 2022-03-04 | End: 2022-06-06

## 2022-03-04 NOTE — PROGRESS NOTES
Chief Complaint  Cesario Dueñas presents to [facility] for Hypertension (3MO) and Hyperlipidemia    Subjective          History of Present Illness    Bill is taking atorvastatin for hyperlipidemia.   He was also started on lisinopril 5 mg daily at last visit. BP log shows readings typically 140s-150s/80s-90s.  Also notes tremors. Thought to be consistent with essential tremor. Previously failed propranolol. Dr Portillo started on primidone. This made him have fatigue, more emotional. He stopped taking on advise of his psychiatrist. Normal B12, TSH. Has appt scheduled with neurology later this month.  On testosterone injections for testosterone deficiency. He did miss some doses due to previous provider leaving. Last level low at 112 last month.   He sees Dr Clive Singh psych for depression/anxiety. He is on gabapentin and xanax. He was also started on another medication for mood since last visit but does not remember the name of it.   Due for colonoscopy. Has been to Dr Joseluis Pina in [place] in the past.     Review of Systems      No Known Allergies   Past Medical History:   Diagnosis Date   • Hypertension      Current Outpatient Medications   Medication Sig Dispense Refill   • ALPRAZolam (XANAX) 0.5 MG tablet Take 1 tablet by mouth 3 (Three) Times a Day As Needed.     • atorvastatin (LIPITOR) 20 MG tablet Take 1 tablet by mouth Daily. 90 tablet 0   • cholecalciferol (VITAMIN D3) 25 MCG (1000 UT) tablet Take 1,000 Units by mouth Daily.     • gabapentin (NEURONTIN) 600 MG tablet Take 1 tablet by mouth 2 (two) times a day.     • levocetirizine (XYZAL) 5 MG tablet Take  by mouth.     • lisinopril (PRINIVIL,ZESTRIL) 10 MG tablet Take 1 tablet by mouth Daily for 90 days. 90 tablet 0   • montelukast (SINGULAIR) 10 MG tablet Take 10 mg by mouth.     • multivitamin with minerals tablet tablet Take 1 tablet by mouth Daily.     • sildenafil (VIAGRA) 100 MG tablet Take 1 tablet by mouth  "As Needed for Erectile Dysfunction. 15 tablet 0   • Testosterone Cypionate (DEPOTESTOTERONE CYPIONATE) 200 MG/ML injection INJECT 1 ML INTRAMUSCULARLY AS DIRECTED EVERY 14 DAYS 2 mL 0     No current facility-administered medications for this visit.     History reviewed. No pertinent surgical history.   Social History     Tobacco Use   • Smoking status: Former Smoker     Types: Cigarettes     Quit date:      Years since quittin.1   • Smokeless tobacco: Never Used   Vaping Use   • Vaping Use: Never used   Substance Use Topics   • Alcohol use: Yes     Comment: whiskey daily   • Drug use: Never     History reviewed. No pertinent family history.  Health Maintenance Due   Topic Date Due   • COLORECTAL CANCER SCREENING  Never done   • HEPATITIS C SCREENING  Never done   • ANNUAL WELLNESS VISIT  Never done      Immunization History   Administered Date(s) Administered   • COVID-19 (MODERNA) 1st, 2nd, 3rd Dose Only 2021, 2021   • COVID-19 (PFIZER) PURPLE CAP 12/10/2021   • FLUAD TRI 65YR+ 10/21/2019, 10/09/2020   • Flu Vaccine Quad PF >18YRS 10/09/2020   • Fluzone High-Dose 65+yrs 2021   • Hepatitis A 2018, 2018   • Pneumococcal Conjugate 13-Valent (PCV13) 10/21/2015   • Pneumococcal Polysaccharide (PPSV23) 2015   • Td 2012        Objective     Vitals:    22 1336   BP: 149/84   Pulse: 80   Temp: 98 °F (36.7 °C)   SpO2: 100%   Weight: 88.5 kg (195 lb)   Height: 175.3 cm (69\")     Body mass index is 28.8 kg/m².     Physical Exam  Vitals reviewed.   Constitutional:       General: He is not in acute distress.     Appearance: Normal appearance. He is well-developed.   HENT:      Head: Normocephalic and atraumatic.   Cardiovascular:      Rate and Rhythm: Normal rate and regular rhythm.   Pulmonary:      Effort: Pulmonary effort is normal.      Breath sounds: Normal breath sounds.   Neurological:      Mental Status: He is alert and oriented to person, place, and time. "   Psychiatric:         Mood and Affect: Mood and affect normal.           Result Review :               Urine Drug Screen - Urine, Clean Catch (12/16/2021 01:35)  Comprehensive Metabolic Panel (12/16/2021 00:58)  CBC & Differential (12/16/2021 00:58)  Lipid Panel (09/30/2021 08:37)  Testosterone (09/30/2021 08:37)  TSH (09/30/2021 08:37)                  Assessment and Plan      Diagnoses and all orders for this visit:    1. Primary hypertension (Primary)  Comments:  Will increase Lisinopril dose to 10 mg daily. BP log.  Orders:  -     lisinopril (PRINIVIL,ZESTRIL) 10 MG tablet; Take 1 tablet by mouth Daily for 90 days.  Dispense: 90 tablet; Refill: 0    2. Screening for colorectal cancer  -     Ambulatory Referral to General Surgery    3. Screening for viral disease  -     Hepatitis C antibody; Future    4. Screening for cardiovascular condition  -     Comprehensive Metabolic Panel; Future  -     Lipid Panel; Future    5. Vitamin D deficiency  -     Vitamin D 25 hydroxy; Future    6. Other specified forms of tremor  Comments:  Keep scheduled appt with neurology  Orders:  -     TSH; Future    7. Vitamin B12 deficiency  -     CBC & Differential; Future  -     Vitamin B12; Future    8. Mixed hyperlipidemia  Comments:  Medical condition is stable.  Continue same therapy.  Will recheck at next regular appointment.  Orders:  -     atorvastatin (LIPITOR) 20 MG tablet; Take 1 tablet by mouth Daily.  Dispense: 90 tablet; Refill: 0    9. Testosterone deficiency  -     Testosterone; Future  -     Testosterone Cypionate (DEPOTESTOTERONE CYPIONATE) 200 MG/ML injection; INJECT 1 ML INTRAMUSCULARLY AS DIRECTED EVERY 14 DAYS  Dispense: 2 mL; Refill: 0    10. Allergic rhinitis, unspecified seasonality, unspecified trigger  -     Allergy Serum Injection  -     Allergy Serum Injection    Controlled substance documentation: Juan A reviewed; drug screen performed and appropriate; consent is reviewed and signed and on the chart.  Is  aware of risk of addiction on this medication, understands will need to follow up for a review at least every 3 months and medications will be adjusted or decreased as deemed appropriate at each visit.  No history of drug or alcohol abuse.  No concerns about diversion or abuse. Denies side effects related to the medication.  Aware may be called in for pill counts.  The dosing of this medication will be reviewed on a regular basis and reduced if possible.             Follow Up     Return in about 3 months (around 6/4/2022).

## 2022-03-16 ENCOUNTER — OFFICE VISIT (OUTPATIENT)
Dept: NEUROLOGY | Facility: CLINIC | Age: 72
End: 2022-03-16

## 2022-03-16 ENCOUNTER — TELEPHONE (OUTPATIENT)
Dept: FAMILY MEDICINE CLINIC | Age: 72
End: 2022-03-16

## 2022-03-16 VITALS
BODY MASS INDEX: 28.66 KG/M2 | WEIGHT: 193.5 LBS | DIASTOLIC BLOOD PRESSURE: 87 MMHG | SYSTOLIC BLOOD PRESSURE: 156 MMHG | HEIGHT: 69 IN | HEART RATE: 82 BPM

## 2022-03-16 DIAGNOSIS — G25.0 BENIGN ESSENTIAL TREMOR: Primary | ICD-10-CM

## 2022-03-16 PROCEDURE — 99215 OFFICE O/P EST HI 40 MIN: CPT | Performed by: NURSE PRACTITIONER

## 2022-03-16 RX ORDER — DESVENLAFAXINE SUCCINATE 50 MG/1
50 TABLET, EXTENDED RELEASE ORAL DAILY
COMMUNITY

## 2022-03-16 RX ORDER — PRIMIDONE 50 MG/1
TABLET ORAL
Qty: 90 TABLET | Refills: 2 | Status: SHIPPED | OUTPATIENT
Start: 2022-03-16 | End: 2022-05-31

## 2022-03-16 NOTE — PROGRESS NOTES
"Chief Complaint  Tremors    Subjective          Cesario Dueñas presents to [facility]ERY  States he's had tremor to bilateral hands for several years.  Worsens with intention.  Noticed it yesterday while holding a coffee pot.  Worse with nervous or being cold.  No falls.  Denies freezing gait.  Sometimes feel he's having some jaw tremor. Has previously been on primidone and propranolol before.        Objective   Vital Signs:   /87 (BP Location: Left arm, Patient Position: Sitting)   Pulse 82   Ht 175.3 cm (69\")   Wt 87.8 kg (193 lb 8 oz)   BMI 28.57 kg/m²     Physical Exam  Neurological:      Mental Status: He is oriented to person, place, and time.      Gait: Gait is intact.      Deep Tendon Reflexes: Strength normal.        Neurologic Exam     Mental Status   Oriented to person, place, and time.     Cranial Nerves   Cranial nerves II through XII intact.     Motor Exam   Muscle bulk: normal    Strength   Strength 5/5 throughout.     Gait, Coordination, and Reflexes     Gait  Gait: normal    Tremor   Intention tremor: present (Bilateral hands.)No bradykinesia or rigidity noted.        Result Review :   CBC:  Lab Results   Component Value Date    WBC 6.47 12/16/2021    RBC 4.58 12/16/2021    HGB 16.3 12/16/2021    HCT 46.7 12/16/2021    .0 (H) 12/16/2021    MCH 35.6 (H) 12/16/2021    MCHC 34.9 12/16/2021    RDW 13.6 12/16/2021     12/16/2021     CMP:  Lab Results   Component Value Date    BUN 8 12/16/2021    CREATININE 1.03 12/16/2021    EGFRIFNONA 71 12/16/2021    EGFRIFAFRI 88 09/30/2021     (H) 12/16/2021    K 4.1 12/16/2021     12/16/2021    CALCIUM 9.2 12/16/2021    ALBUMIN 4.70 12/16/2021    BILITOT 0.3 12/16/2021    ALKPHOS 73 12/16/2021     (H) 12/16/2021    ALT 62 (H) 12/16/2021     B12:   Lab Results   Component Value Date    AKKSUORZ95 1,845 (H) 09/30/2021      FOLATE:   Lab Results   Component Value Date    FOLATE >20.0 " 12/29/2020                 Assessment and Plan    Diagnoses and all orders for this visit:    1. Benign essential tremor (Primary)  Assessment & Plan:  No sign of parkinsonism.  Appears to be a benign essential tremor.  He previously did not tolerate primidone 3 times daily.  Will start primidone nightly and see if he tolerates this better.  Discussed that there is no cure for essential tremor we are just attempting to manage and reduce the tremor.      Other orders  -     primidone (MYSOLINE) 50 MG tablet; 1 tablet at night for 2 weeks then increase to 2 tablets at night for 2 weeks, then increase to 3 tablets at night thereafter.  Dispense: 90 tablet; Refill: 2    I spent 42 minutes caring for Cesario on this date of service. This time includes time spent by me in the following activities:preparing for the visit, reviewing tests, obtaining and/or reviewing a separately obtained history, performing a medically appropriate examination and/or evaluation , counseling and educating the patient/family/caregiver, ordering medications, tests, or procedures, documenting information in the medical record and independently interpreting results and communicating that information with the patient/family/caregiver  Follow Up   Return in about 2 months (around 5/16/2022) for tremor f/u.  Patient was given instructions and counseling regarding his condition or for health maintenance advice. Please see specific information pulled into the AVS if appropriate.

## 2022-03-16 NOTE — TELEPHONE ENCOUNTER
Spoke to patient regarding overdue lab orders, states he will come in tomorrow morning (1st attempt)

## 2022-03-17 ENCOUNTER — LAB (OUTPATIENT)
Dept: LAB | Facility: HOSPITAL | Age: 72
End: 2022-03-17

## 2022-03-17 ENCOUNTER — CLINICAL SUPPORT (OUTPATIENT)
Dept: FAMILY MEDICINE CLINIC | Age: 72
End: 2022-03-17

## 2022-03-17 VITALS — SYSTOLIC BLOOD PRESSURE: 162 MMHG | HEART RATE: 71 BPM | DIASTOLIC BLOOD PRESSURE: 91 MMHG

## 2022-03-17 DIAGNOSIS — E34.9 TESTOSTERONE DEFICIENCY: ICD-10-CM

## 2022-03-17 DIAGNOSIS — J30.9 ALLERGIC RHINITIS, UNSPECIFIED SEASONALITY, UNSPECIFIED TRIGGER: Primary | ICD-10-CM

## 2022-03-17 DIAGNOSIS — Z11.59 SCREENING FOR VIRAL DISEASE: ICD-10-CM

## 2022-03-17 DIAGNOSIS — G25.2 OTHER SPECIFIED FORMS OF TREMOR: ICD-10-CM

## 2022-03-17 DIAGNOSIS — E55.9 VITAMIN D DEFICIENCY: ICD-10-CM

## 2022-03-17 DIAGNOSIS — Z13.6 SCREENING FOR CARDIOVASCULAR CONDITION: ICD-10-CM

## 2022-03-17 DIAGNOSIS — E53.8 VITAMIN B12 DEFICIENCY: ICD-10-CM

## 2022-03-17 PROBLEM — G25.0 BENIGN ESSENTIAL TREMOR: Status: ACTIVE | Noted: 2022-03-17

## 2022-03-17 LAB
25(OH)D3 SERPL-MCNC: 58.4 NG/ML (ref 30–100)
ALBUMIN SERPL-MCNC: 4.3 G/DL (ref 3.5–5.2)
ALBUMIN/GLOB SERPL: 1.7 G/DL
ALP SERPL-CCNC: 76 U/L (ref 39–117)
ALT SERPL W P-5'-P-CCNC: 27 U/L (ref 1–41)
ANION GAP SERPL CALCULATED.3IONS-SCNC: 8.6 MMOL/L (ref 5–15)
AST SERPL-CCNC: 35 U/L (ref 1–40)
BASOPHILS # BLD AUTO: 0.05 10*3/MM3 (ref 0–0.2)
BASOPHILS NFR BLD AUTO: 0.9 % (ref 0–1.5)
BILIRUB SERPL-MCNC: 0.5 MG/DL (ref 0–1.2)
BUN SERPL-MCNC: 15 MG/DL (ref 8–23)
BUN/CREAT SERPL: 13.6 (ref 7–25)
CALCIUM SPEC-SCNC: 8.9 MG/DL (ref 8.6–10.5)
CHLORIDE SERPL-SCNC: 106 MMOL/L (ref 98–107)
CHOLEST SERPL-MCNC: 174 MG/DL (ref 0–200)
CO2 SERPL-SCNC: 28.4 MMOL/L (ref 22–29)
CREAT SERPL-MCNC: 1.1 MG/DL (ref 0.76–1.27)
DEPRECATED RDW RBC AUTO: 54.7 FL (ref 37–54)
EGFRCR SERPLBLD CKD-EPI 2021: 72.2 ML/MIN/1.73
EOSINOPHIL # BLD AUTO: 0.12 10*3/MM3 (ref 0–0.4)
EOSINOPHIL NFR BLD AUTO: 2.2 % (ref 0.3–6.2)
ERYTHROCYTE [DISTWIDTH] IN BLOOD BY AUTOMATED COUNT: 13.8 % (ref 12.3–15.4)
GLOBULIN UR ELPH-MCNC: 2.6 GM/DL
GLUCOSE SERPL-MCNC: 95 MG/DL (ref 65–99)
HCT VFR BLD AUTO: 42.4 % (ref 37.5–51)
HCV AB SER DONR QL: NORMAL
HDLC SERPL-MCNC: 88 MG/DL (ref 40–60)
HGB BLD-MCNC: 14.3 G/DL (ref 13–17.7)
IMM GRANULOCYTES # BLD AUTO: 0 10*3/MM3 (ref 0–0.05)
IMM GRANULOCYTES NFR BLD AUTO: 0 % (ref 0–0.5)
LDLC SERPL CALC-MCNC: 76 MG/DL (ref 0–100)
LDLC/HDLC SERPL: 0.87 {RATIO}
LYMPHOCYTES # BLD AUTO: 1.86 10*3/MM3 (ref 0.7–3.1)
LYMPHOCYTES NFR BLD AUTO: 34.4 % (ref 19.6–45.3)
MCH RBC QN AUTO: 35.2 PG (ref 26.6–33)
MCHC RBC AUTO-ENTMCNC: 33.7 G/DL (ref 31.5–35.7)
MCV RBC AUTO: 104.4 FL (ref 79–97)
MONOCYTES # BLD AUTO: 0.51 10*3/MM3 (ref 0.1–0.9)
MONOCYTES NFR BLD AUTO: 9.4 % (ref 5–12)
NEUTROPHILS NFR BLD AUTO: 2.86 10*3/MM3 (ref 1.7–7)
NEUTROPHILS NFR BLD AUTO: 53.1 % (ref 42.7–76)
PLATELET # BLD AUTO: 139 10*3/MM3 (ref 140–450)
PMV BLD AUTO: 11.2 FL (ref 6–12)
POTASSIUM SERPL-SCNC: 4.3 MMOL/L (ref 3.5–5.2)
PROT SERPL-MCNC: 6.9 G/DL (ref 6–8.5)
RBC # BLD AUTO: 4.06 10*6/MM3 (ref 4.14–5.8)
SODIUM SERPL-SCNC: 143 MMOL/L (ref 136–145)
TESTOST SERPL-MCNC: 113 NG/DL (ref 193–740)
TRIGL SERPL-MCNC: 49 MG/DL (ref 0–150)
TSH SERPL DL<=0.05 MIU/L-ACNC: 3.01 UIU/ML (ref 0.27–4.2)
VIT B12 BLD-MCNC: 495 PG/ML (ref 211–946)
VLDLC SERPL-MCNC: 10 MG/DL (ref 5–40)
WBC NRBC COR # BLD: 5.4 10*3/MM3 (ref 3.4–10.8)

## 2022-03-17 PROCEDURE — 82607 VITAMIN B-12: CPT

## 2022-03-17 PROCEDURE — 84443 ASSAY THYROID STIM HORMONE: CPT

## 2022-03-17 PROCEDURE — 96372 THER/PROPH/DIAG INJ SC/IM: CPT | Performed by: FAMILY MEDICINE

## 2022-03-17 PROCEDURE — 95117 IMMUNOTHERAPY INJECTIONS: CPT | Performed by: FAMILY MEDICINE

## 2022-03-17 PROCEDURE — 80061 LIPID PANEL: CPT

## 2022-03-17 PROCEDURE — 85025 COMPLETE CBC W/AUTO DIFF WBC: CPT

## 2022-03-17 PROCEDURE — 84403 ASSAY OF TOTAL TESTOSTERONE: CPT

## 2022-03-17 PROCEDURE — 80053 COMPREHEN METABOLIC PANEL: CPT

## 2022-03-17 PROCEDURE — 36415 COLL VENOUS BLD VENIPUNCTURE: CPT

## 2022-03-17 PROCEDURE — 86803 HEPATITIS C AB TEST: CPT

## 2022-03-17 PROCEDURE — 82306 VITAMIN D 25 HYDROXY: CPT

## 2022-03-17 RX ORDER — TESTOSTERONE CYPIONATE 200 MG/ML
200 INJECTION, SOLUTION INTRAMUSCULAR
Status: DISCONTINUED | OUTPATIENT
Start: 2022-03-17 | End: 2022-03-18

## 2022-03-17 RX ADMIN — TESTOSTERONE CYPIONATE 200 MG: 200 INJECTION, SOLUTION INTRAMUSCULAR at 14:13

## 2022-03-17 NOTE — PROGRESS NOTES
I have reviewed the notes, assessments, and/or procedures performed by Stacy Chaves LPN, and I concur with her documentation of Cesario Dueñas.

## 2022-03-17 NOTE — ASSESSMENT & PLAN NOTE
No sign of parkinsonism.  Appears to be a benign essential tremor.  He previously did not tolerate primidone 3 times daily.  Will start primidone nightly and see if he tolerates this better.  Discussed that there is no cure for essential tremor we are just attempting to manage and reduce the tremor.

## 2022-03-18 DIAGNOSIS — E34.9 TESTOSTERONE DEFICIENCY: ICD-10-CM

## 2022-03-18 RX ORDER — TESTOSTERONE CYPIONATE 200 MG/ML
INJECTION, SOLUTION INTRAMUSCULAR
Qty: 3 ML | Refills: 0 | Status: SHIPPED | OUTPATIENT
Start: 2022-03-18 | End: 2022-04-29

## 2022-03-28 ENCOUNTER — CLINICAL SUPPORT (OUTPATIENT)
Dept: FAMILY MEDICINE CLINIC | Age: 72
End: 2022-03-28

## 2022-03-28 VITALS — DIASTOLIC BLOOD PRESSURE: 90 MMHG | HEART RATE: 75 BPM | SYSTOLIC BLOOD PRESSURE: 160 MMHG

## 2022-03-28 DIAGNOSIS — E34.9 TESTOSTERONE DEFICIENCY: ICD-10-CM

## 2022-03-28 DIAGNOSIS — J30.9 ALLERGIC RHINITIS, UNSPECIFIED SEASONALITY, UNSPECIFIED TRIGGER: Primary | ICD-10-CM

## 2022-03-28 PROCEDURE — 95117 IMMUNOTHERAPY INJECTIONS: CPT | Performed by: FAMILY MEDICINE

## 2022-03-28 PROCEDURE — 96372 THER/PROPH/DIAG INJ SC/IM: CPT | Performed by: NURSE PRACTITIONER

## 2022-03-28 RX ADMIN — TESTOSTERONE CYPIONATE 300 MG: 200 INJECTION, SOLUTION INTRAMUSCULAR at 09:06

## 2022-03-30 RX ORDER — TESTOSTERONE CYPIONATE 200 MG/ML
300 INJECTION, SOLUTION INTRAMUSCULAR
Status: DISCONTINUED | OUTPATIENT
Start: 2022-03-30 | End: 2022-10-07

## 2022-04-15 ENCOUNTER — CLINICAL SUPPORT (OUTPATIENT)
Dept: FAMILY MEDICINE CLINIC | Age: 72
End: 2022-04-15

## 2022-04-15 DIAGNOSIS — J30.9 ALLERGIC RHINITIS, UNSPECIFIED SEASONALITY, UNSPECIFIED TRIGGER: Primary | ICD-10-CM

## 2022-04-15 DIAGNOSIS — E34.9 TESTOSTERONE DEFICIENCY: ICD-10-CM

## 2022-04-15 PROCEDURE — 96372 THER/PROPH/DIAG INJ SC/IM: CPT | Performed by: FAMILY MEDICINE

## 2022-04-15 PROCEDURE — 95117 IMMUNOTHERAPY INJECTIONS: CPT | Performed by: FAMILY MEDICINE

## 2022-04-15 RX ADMIN — TESTOSTERONE CYPIONATE 300 MG: 200 INJECTION, SOLUTION INTRAMUSCULAR at 12:05

## 2022-04-29 ENCOUNTER — CLINICAL SUPPORT (OUTPATIENT)
Dept: FAMILY MEDICINE CLINIC | Age: 72
End: 2022-04-29

## 2022-04-29 DIAGNOSIS — J30.9 ALLERGIC RHINITIS, UNSPECIFIED SEASONALITY, UNSPECIFIED TRIGGER: Primary | ICD-10-CM

## 2022-04-29 DIAGNOSIS — E34.9 TESTOSTERONE DEFICIENCY: ICD-10-CM

## 2022-04-29 PROCEDURE — 95117 IMMUNOTHERAPY INJECTIONS: CPT | Performed by: FAMILY MEDICINE

## 2022-04-29 RX ORDER — TESTOSTERONE CYPIONATE 200 MG/ML
INJECTION, SOLUTION INTRAMUSCULAR
Qty: 4 ML | Refills: 0 | Status: SHIPPED | OUTPATIENT
Start: 2022-04-29 | End: 2022-06-02

## 2022-05-05 ENCOUNTER — CLINICAL SUPPORT (OUTPATIENT)
Dept: FAMILY MEDICINE CLINIC | Age: 72
End: 2022-05-05

## 2022-05-05 VITALS — DIASTOLIC BLOOD PRESSURE: 84 MMHG | SYSTOLIC BLOOD PRESSURE: 159 MMHG | HEART RATE: 82 BPM

## 2022-05-05 DIAGNOSIS — J30.9 ALLERGIC RHINITIS, UNSPECIFIED SEASONALITY, UNSPECIFIED TRIGGER: Primary | ICD-10-CM

## 2022-05-05 DIAGNOSIS — E34.9 TESTOSTERONE DEFICIENCY: ICD-10-CM

## 2022-05-05 PROCEDURE — 96372 THER/PROPH/DIAG INJ SC/IM: CPT | Performed by: FAMILY MEDICINE

## 2022-05-05 PROCEDURE — 95117 IMMUNOTHERAPY INJECTIONS: CPT | Performed by: FAMILY MEDICINE

## 2022-05-05 RX ADMIN — TESTOSTERONE CYPIONATE 300 MG: 200 INJECTION, SOLUTION INTRAMUSCULAR at 13:37

## 2022-05-19 ENCOUNTER — CLINICAL SUPPORT (OUTPATIENT)
Dept: FAMILY MEDICINE CLINIC | Age: 72
End: 2022-05-19

## 2022-05-19 VITALS — SYSTOLIC BLOOD PRESSURE: 160 MMHG | HEART RATE: 82 BPM | DIASTOLIC BLOOD PRESSURE: 94 MMHG

## 2022-05-19 DIAGNOSIS — E34.9 TESTOSTERONE DEFICIENCY: ICD-10-CM

## 2022-05-19 DIAGNOSIS — J30.9 ALLERGIC RHINITIS, UNSPECIFIED SEASONALITY, UNSPECIFIED TRIGGER: Primary | ICD-10-CM

## 2022-05-19 PROCEDURE — 96372 THER/PROPH/DIAG INJ SC/IM: CPT | Performed by: FAMILY MEDICINE

## 2022-05-19 PROCEDURE — 95117 IMMUNOTHERAPY INJECTIONS: CPT | Performed by: FAMILY MEDICINE

## 2022-05-19 RX ADMIN — TESTOSTERONE CYPIONATE 300 MG: 200 INJECTION, SOLUTION INTRAMUSCULAR at 14:08

## 2022-05-27 ENCOUNTER — CLINICAL SUPPORT (OUTPATIENT)
Dept: FAMILY MEDICINE CLINIC | Age: 72
End: 2022-05-27

## 2022-05-27 DIAGNOSIS — J30.9 ALLERGIC RHINITIS, UNSPECIFIED SEASONALITY, UNSPECIFIED TRIGGER: Primary | ICD-10-CM

## 2022-05-27 PROCEDURE — 95117 IMMUNOTHERAPY INJECTIONS: CPT | Performed by: FAMILY MEDICINE

## 2022-05-31 ENCOUNTER — OFFICE VISIT (OUTPATIENT)
Dept: NEUROLOGY | Facility: CLINIC | Age: 72
End: 2022-05-31

## 2022-05-31 VITALS
HEART RATE: 86 BPM | HEIGHT: 69 IN | DIASTOLIC BLOOD PRESSURE: 88 MMHG | WEIGHT: 194.7 LBS | BODY MASS INDEX: 28.84 KG/M2 | SYSTOLIC BLOOD PRESSURE: 148 MMHG

## 2022-05-31 DIAGNOSIS — G25.0 BENIGN ESSENTIAL TREMOR: Primary | ICD-10-CM

## 2022-05-31 PROCEDURE — 99214 OFFICE O/P EST MOD 30 MIN: CPT | Performed by: NURSE PRACTITIONER

## 2022-05-31 RX ORDER — PRIMIDONE 250 MG/1
250 TABLET ORAL NIGHTLY
Qty: 30 TABLET | Refills: 3 | Status: SHIPPED | OUTPATIENT
Start: 2022-05-31 | End: 2022-09-06 | Stop reason: SDUPTHER

## 2022-06-01 NOTE — ASSESSMENT & PLAN NOTE
Is tolerating qHS dose of primidone.  Will increase to 250mg.  He will notify the office of any side effects.

## 2022-06-02 ENCOUNTER — CLINICAL SUPPORT (OUTPATIENT)
Dept: FAMILY MEDICINE CLINIC | Age: 72
End: 2022-06-02

## 2022-06-02 DIAGNOSIS — E34.9 TESTOSTERONE DEFICIENCY: ICD-10-CM

## 2022-06-02 DIAGNOSIS — J30.9 ALLERGIC RHINITIS, UNSPECIFIED SEASONALITY, UNSPECIFIED TRIGGER: Primary | ICD-10-CM

## 2022-06-02 PROCEDURE — 95117 IMMUNOTHERAPY INJECTIONS: CPT | Performed by: FAMILY MEDICINE

## 2022-06-02 RX ORDER — TESTOSTERONE CYPIONATE 200 MG/ML
INJECTION, SOLUTION INTRAMUSCULAR
Qty: 4 ML | Refills: 0 | Status: SHIPPED | OUTPATIENT
Start: 2022-06-02 | End: 2022-06-06 | Stop reason: SDUPTHER

## 2022-06-06 ENCOUNTER — OFFICE VISIT (OUTPATIENT)
Dept: FAMILY MEDICINE CLINIC | Age: 72
End: 2022-06-06

## 2022-06-06 ENCOUNTER — TELEPHONE (OUTPATIENT)
Dept: FAMILY MEDICINE CLINIC | Age: 72
End: 2022-06-06

## 2022-06-06 VITALS
TEMPERATURE: 98.3 F | BODY MASS INDEX: 28.44 KG/M2 | WEIGHT: 192 LBS | HEIGHT: 69 IN | HEART RATE: 81 BPM | SYSTOLIC BLOOD PRESSURE: 149 MMHG | DIASTOLIC BLOOD PRESSURE: 83 MMHG

## 2022-06-06 DIAGNOSIS — E34.9 TESTOSTERONE DEFICIENCY: ICD-10-CM

## 2022-06-06 DIAGNOSIS — Z79.899 HIGH RISK MEDICATION USE: ICD-10-CM

## 2022-06-06 DIAGNOSIS — Z00.00 MEDICARE ANNUAL WELLNESS VISIT, SUBSEQUENT: Primary | ICD-10-CM

## 2022-06-06 DIAGNOSIS — M25.512 ACUTE PAIN OF LEFT SHOULDER: ICD-10-CM

## 2022-06-06 DIAGNOSIS — E78.2 MIXED HYPERLIPIDEMIA: ICD-10-CM

## 2022-06-06 DIAGNOSIS — I10 PRIMARY HYPERTENSION: ICD-10-CM

## 2022-06-06 LAB
AMPHET+METHAMPHET UR QL: NEGATIVE
AMPHETAMINES UR QL: NEGATIVE
BARBITURATES UR QL SCN: POSITIVE
BENZODIAZ UR QL SCN: POSITIVE
BUPRENORPHINE SERPL-MCNC: NEGATIVE NG/ML
CANNABINOIDS SERPL QL: NEGATIVE
COCAINE UR QL: NEGATIVE
EXPIRATION DATE: ABNORMAL
Lab: ABNORMAL
MDMA UR QL SCN: NEGATIVE
METHADONE UR QL SCN: NEGATIVE
OPIATES UR QL: NEGATIVE
OXYCODONE UR QL SCN: NEGATIVE
PCP UR QL SCN: NEGATIVE

## 2022-06-06 PROCEDURE — 80305 DRUG TEST PRSMV DIR OPT OBS: CPT | Performed by: NURSE PRACTITIONER

## 2022-06-06 PROCEDURE — 1159F MED LIST DOCD IN RCRD: CPT | Performed by: NURSE PRACTITIONER

## 2022-06-06 PROCEDURE — G0439 PPPS, SUBSEQ VISIT: HCPCS | Performed by: NURSE PRACTITIONER

## 2022-06-06 PROCEDURE — 1170F FXNL STATUS ASSESSED: CPT | Performed by: NURSE PRACTITIONER

## 2022-06-06 PROCEDURE — 99214 OFFICE O/P EST MOD 30 MIN: CPT | Performed by: NURSE PRACTITIONER

## 2022-06-06 RX ORDER — MELOXICAM 15 MG/1
15 TABLET ORAL DAILY PRN
Qty: 30 TABLET | Refills: 1 | Status: SHIPPED | OUTPATIENT
Start: 2022-06-06 | End: 2022-08-05

## 2022-06-06 RX ORDER — SILDENAFIL 100 MG/1
100 TABLET, FILM COATED ORAL AS NEEDED
Qty: 30 TABLET | Refills: 0 | Status: SHIPPED | OUTPATIENT
Start: 2022-06-06

## 2022-06-06 RX ORDER — TESTOSTERONE CYPIONATE 200 MG/ML
INJECTION, SOLUTION INTRAMUSCULAR
Qty: 4 ML | Refills: 1 | Status: SHIPPED | OUTPATIENT
Start: 2022-06-06 | End: 2022-09-07 | Stop reason: SDUPTHER

## 2022-06-06 RX ORDER — ATORVASTATIN CALCIUM 20 MG/1
20 TABLET, FILM COATED ORAL DAILY
Qty: 90 TABLET | Refills: 0 | Status: SHIPPED | OUTPATIENT
Start: 2022-06-06 | End: 2022-09-07 | Stop reason: SDUPTHER

## 2022-06-06 RX ORDER — LISINOPRIL 20 MG/1
20 TABLET ORAL DAILY
Qty: 90 TABLET | Refills: 0 | Status: SHIPPED | OUTPATIENT
Start: 2022-06-06 | End: 2022-09-07 | Stop reason: SDUPTHER

## 2022-06-06 RX ORDER — LAMOTRIGINE 200 MG/1
200 TABLET ORAL
COMMUNITY
End: 2022-06-06

## 2022-06-06 NOTE — ASSESSMENT & PLAN NOTE
Controlled substance documentation: FLAVIA reviewed; drug screen performed and appropriate; consent is reviewed and signed and on the chart.  Is aware of risk of addiction on this medication, understands will need to follow up for a review at least every 3 months and medications will be adjusted or decreased as deemed appropriate at each visit.  No history of drug or alcohol abuse.  No concerns about diversion or abuse. Denies side effects related to the medication.  Aware may be called in for pill counts.  The dosing of this medication will be reviewed on a regular basis and reduced if possible.

## 2022-06-06 NOTE — PROGRESS NOTES
The ABCs of the Annual Wellness Visit  Subsequent Medicare Wellness Visit    Chief Complaint   Patient presents with   • Medicare Wellness-subsequent      Subjective    History of Present Illness:  Cesario Dueñas is a 71 y.o. male who presents for a Subsequent Medicare Wellness Visit.    The following portions of the patient's history were reviewed and   updated as appropriate: allergies, current medications, past family history, past medical history, past social history, past surgical history and problem list.    Compared to one year ago, the patient feels his physical   health is the same.    Compared to one year ago, the patient feels his mental   health is better.    Bill is taking atorvastatin for hyperlipidemia and lisinopril for HTN. BP log shows readings 140s-150s.   He has seen neurology for essential tremors. Started on primidone nightly with improvement.  He is on testosterone injections for testosterone deficiency.  He sees Dr Clive Singh psychiatry for depression/anxiety. He is on pristiq, gabapentin, and xanax.  He has had colonoscopy since last visit. With Dr Joseluis Pina at [facility]. He is going to have gallbladder ultrasound.   He c/o left shoulder pain. Reports that he picked up something heavy about one month ago and injured it. He has taken Ibuprofen and applied OTC hot cream. Notes difficulty in raising arm due to discomfort. He had a pulled tendon a few years ago and told he had a bad joint in his shoulder. He saw Dr Valencia previously.       Recent Hospitalizations:  He was admitted within the past 365 days at [facility] [place].       Current Medical Providers:  Patient Care Team:  Lenka Milligan APRN as PCP - General (Nurse Practitioner)  Clive Singh MD as Consulting Physician (Psychiatry)  Ingrid Chiu APRN as Nurse Practitioner (Neurology)    Outpatient Medications Prior to Visit   Medication Sig Dispense Refill   • ALPRAZolam (XANAX) 0.5 MG tablet Take 1 tablet by  mouth 3 (Three) Times a Day As Needed.     • cholecalciferol (VITAMIN D3) 25 MCG (1000 UT) tablet Take 1,000 Units by mouth Daily.     • desvenlafaxine (PRISTIQ) 50 MG 24 hr tablet Take 50 mg by mouth Daily.     • gabapentin (NEURONTIN) 600 MG tablet Take 1 tablet by mouth 2 (two) times a day.     • levocetirizine (XYZAL) 5 MG tablet Take  by mouth As Needed.     • montelukast (SINGULAIR) 10 MG tablet Take 10 mg by mouth As Needed.     • multivitamin with minerals tablet tablet Take 1 tablet by mouth Daily.     • primidone (MYSOLINE) 250 MG tablet Take 1 tablet by mouth Every Night. 30 tablet 3   • atorvastatin (LIPITOR) 20 MG tablet Take 1 tablet by mouth Daily. 90 tablet 0   • lisinopril (PRINIVIL,ZESTRIL) 10 MG tablet Take 1 tablet by mouth Daily for 90 days. 90 tablet 0   • sildenafil (VIAGRA) 100 MG tablet Take 1 tablet by mouth As Needed for Erectile Dysfunction. 15 tablet 0   • Testosterone Cypionate (DEPOTESTOTERONE CYPIONATE) 200 MG/ML injection INJECT 1.5ML INTRAMUSCULARLY AS DIRECTED EVERY 14 DAYS -DISCARD REMAINDER OF 2ND VIAL AFTER EACH DOSE 4 mL 0   • lamoTRIgine (LaMICtal) 200 MG tablet Take 200 mg by mouth.       Facility-Administered Medications Prior to Visit   Medication Dose Route Frequency Provider Last Rate Last Admin   • Testosterone Cypionate (DEPOTESTOTERONE CYPIONATE) injection 300 mg  300 mg Intramuscular Q14 Days Lenka Milligan, APRCATY   300 mg at 05/19/22 1408       No opioid medication identified on active medication list. I have reviewed chart for other potential  high risk medication/s and harmful drug interactions in the elderly.          Aspirin is not on active medication list.  Aspirin use is not indicated based on review of current medical condition/s. Risk of harm outweighs potential benefits.  .    Patient Active Problem List   Diagnosis   • Testicular hypofunction   • Hyperlipidemia, unspecified   • Generalized anxiety disorder   • Other specified forms of tremor   • Allergic  "rhinitis, unspecified   • Other male erectile dysfunction   • Testosterone deficiency   • History of colon polyps   • Vitamin D deficiency   • Hypertension   • Benign essential tremor   • High risk medication use     Advance Care Planning  Advance Directive is not on file.  ACP discussion was held with the patient during this visit. Patient does not have an advance directive, declines further assistance.    Review of Systems   Constitutional: Negative for chills and fever.   HENT: Negative for ear pain and sore throat.    Eyes: Negative for photophobia and visual disturbance.   Respiratory: Negative for cough and shortness of breath.    Cardiovascular: Negative for chest pain and palpitations.   Gastrointestinal: Negative for abdominal pain and rectal pain.   Musculoskeletal:        Left shoulder pain   Skin: Negative for rash.   Neurological: Negative for seizures and syncope.   Psychiatric/Behavioral: Negative for hallucinations and suicidal ideas.        Objective    Vitals:    06/06/22 1158 06/06/22 1203   BP: 154/84 149/83   BP Location: Right arm Left arm   Patient Position: Sitting Sitting   Pulse: 87 81   Temp: 98.3 °F (36.8 °C)    TempSrc: Oral    Weight: 87.1 kg (192 lb)    Height: 175.3 cm (69\")      Body mass index is 28.35 kg/m².  BMI is >= 25 and < 30. (Overweight) The following options were offered after discussion: weight loss educational material (shared in after visit summary)    Does the patient have evidence of cognitive impairment? No    Physical Exam  Vitals reviewed.   Constitutional:       General: He is not in acute distress.     Appearance: Normal appearance. He is well-developed.   HENT:      Head: Normocephalic and atraumatic.   Cardiovascular:      Rate and Rhythm: Normal rate and regular rhythm.   Pulmonary:      Effort: Pulmonary effort is normal.      Breath sounds: Normal breath sounds.   Musculoskeletal:      Left shoulder: Tenderness present. Decreased range of motion. Decreased " strength.   Neurological:      Mental Status: He is alert and oriented to person, place, and time.   Psychiatric:         Mood and Affect: Mood and affect normal.       Lab Results   Component Value Date    TRIG 49 2022    HDL 88 (H) 2022    LDL 76 2022    VLDL 10 2022            HEALTH RISK ASSESSMENT    Smoking Status:  Social History     Tobacco Use   Smoking Status Former Smoker   • Types: Cigarettes   • Quit date:    • Years since quittin.4   Smokeless Tobacco Never Used     Alcohol Consumption:  Social History     Substance and Sexual Activity   Alcohol Use Yes    Comment: maggi daily     Fall Risk Screen:    JOSEADI Fall Risk Assessment was completed, and patient is at LOW risk for falls.Assessment completed on:2022    Depression Screening:  PHQ-2/PHQ-9 Depression Screening 2022   Retired PHQ-9 Total Score -   Retired Total Score -   Little Interest or Pleasure in Doing Things 0-->not at all   Feeling Down, Depressed or Hopeless 0-->not at all   PHQ-9: Brief Depression Severity Measure Score 0       Health Habits and Functional and Cognitive Screening:  Functional & Cognitive Status 2022   Do you have difficulty preparing food and eating? No   Do you have difficulty bathing yourself, getting dressed or grooming yourself? No   Do you have difficulty using the toilet? No   Do you have difficulty moving around from place to place? No   Do you have trouble with steps or getting out of a bed or a chair? No   Current Diet Well Balanced Diet   Dental Exam Up to date   Eye Exam Not up to date   Exercise (times per week) 3 times per week   Current Exercises Include Walking   Do you need help using the phone?  No   Are you deaf or do you have serious difficulty hearing?  No   Do you need help with transportation? No   Do you need help shopping? No   Do you need help preparing meals?  No   Do you need help with housework?  No   Do you need help with laundry? No   Do you need  help taking your medications? No   Do you need help managing money? No   Do you ever drive or ride in a car without wearing a seat belt? No   Have you felt unusual stress, anger or loneliness in the last month? No   Who do you live with? Alone   If you need help, do you have trouble finding someone available to you? No   Have you been bothered in the last four weeks by sexual problems? No   Do you have difficulty concentrating, remembering or making decisions? No       Age-appropriate Screening Schedule:  Refer to the list below for future screening recommendations based on patient's age, sex and/or medical conditions. Orders for these recommended tests are listed in the plan section. The patient has been provided with a written plan.    Health Maintenance   Topic Date Due   • ZOSTER VACCINE (1 of 2) 06/06/2022 (Originally 9/27/2001)   • INFLUENZA VACCINE  08/01/2022   • TDAP/TD VACCINES (2 - Tdap) 08/03/2022   • LIPID PANEL  03/17/2023              Assessment & Plan   CMS Preventative Services Quick Reference  Risk Factors Identified During Encounter  Immunizations Discussed/Encouraged (specific Immunizations; Prevnar 20 (Pneumococcal 20-valent conjugate) and Shingrix  The above risks/problems have been discussed with the patient.  Follow up actions/plans if indicated are seen below in the Assessment/Plan Section.  Pertinent information has been shared with the patient in the After Visit Summary.    Diagnoses and all orders for this visit:    1. Medicare annual wellness visit, subsequent (Primary)  Comments:  Counseled health maintenance recommendations.    2. Acute pain of left shoulder  Comments:  Obtaining xray and will get him back in with Dr Valencia (ortho)  Orders:  -     XR Shoulder 2+ View Left; Future  -     Ambulatory Referral to Orthopedic Surgery  -     meloxicam (MOBIC) 15 MG tablet; Take 1 tablet by mouth Daily As Needed for Moderate Pain  for up to 60 days.  Dispense: 30 tablet; Refill: 1    3. Primary  hypertension  Assessment & Plan:  Will increase Lisinopril dose to 20 mg daily. Continue to monitor    Orders:  -     lisinopril (PRINIVIL,ZESTRIL) 20 MG tablet; Take 1 tablet by mouth Daily.  Dispense: 90 tablet; Refill: 0    4. High risk medication use  Assessment & Plan:  Controlled substance documentation: FLAVIA reviewed; drug screen performed and appropriate; consent is reviewed and signed and on the chart.  Is aware of risk of addiction on this medication, understands will need to follow up for a review at least every 3 months and medications will be adjusted or decreased as deemed appropriate at each visit.  No history of drug or alcohol abuse.  No concerns about diversion or abuse. Denies side effects related to the medication.  Aware may be called in for pill counts.  The dosing of this medication will be reviewed on a regular basis and reduced if possible.       Orders:  -     POC Urine Drug Screen [facility]    5. Mixed hyperlipidemia  Assessment & Plan:  Medical condition is stable.  Continue same therapy.  Will recheck at next regular appointment      Orders:  -     atorvastatin (LIPITOR) 20 MG tablet; Take 1 tablet by mouth Daily.  Dispense: 90 tablet; Refill: 0    6. Testosterone deficiency  Assessment & Plan:  Medical condition is stable.  Continue same therapy.  Will recheck at next regular appointment      Orders:  -     Testosterone Cypionate (DEPOTESTOTERONE CYPIONATE) 200 MG/ML injection; INJECT 1.5ML INTRAMUSCULARLY AS DIRECTED EVERY 14 DAYS  Dispense: 4 mL; Refill: 1    Other orders  -     sildenafil (VIAGRA) 100 MG tablet; Take 1 tablet by mouth As Needed for Erectile Dysfunction.  Dispense: 30 tablet; Refill: 0      Follow Up:   Return in about 3 months (around 9/6/2022) for Recheck.     An After Visit Summary and PPPS were made available to the patient.

## 2022-06-08 ENCOUNTER — CLINICAL SUPPORT (OUTPATIENT)
Dept: FAMILY MEDICINE CLINIC | Age: 72
End: 2022-06-08

## 2022-06-08 VITALS — SYSTOLIC BLOOD PRESSURE: 155 MMHG | DIASTOLIC BLOOD PRESSURE: 89 MMHG | HEART RATE: 94 BPM

## 2022-06-08 DIAGNOSIS — J30.9 ALLERGIC RHINITIS, UNSPECIFIED SEASONALITY, UNSPECIFIED TRIGGER: Primary | ICD-10-CM

## 2022-06-08 PROCEDURE — 95117 IMMUNOTHERAPY INJECTIONS: CPT | Performed by: FAMILY MEDICINE

## 2022-06-08 PROCEDURE — 96372 THER/PROPH/DIAG INJ SC/IM: CPT | Performed by: FAMILY MEDICINE

## 2022-06-08 RX ADMIN — TESTOSTERONE CYPIONATE 300 MG: 200 INJECTION, SOLUTION INTRAMUSCULAR at 13:37

## 2022-06-14 DIAGNOSIS — I10 PRIMARY HYPERTENSION: ICD-10-CM

## 2022-06-14 RX ORDER — LISINOPRIL 10 MG/1
TABLET ORAL
Qty: 30 TABLET | OUTPATIENT
Start: 2022-06-14

## 2022-06-24 ENCOUNTER — CLINICAL SUPPORT (OUTPATIENT)
Dept: FAMILY MEDICINE CLINIC | Age: 72
End: 2022-06-24

## 2022-06-24 DIAGNOSIS — J30.9 ALLERGIC RHINITIS, UNSPECIFIED SEASONALITY, UNSPECIFIED TRIGGER: Primary | ICD-10-CM

## 2022-06-24 PROCEDURE — 96372 THER/PROPH/DIAG INJ SC/IM: CPT | Performed by: NURSE PRACTITIONER

## 2022-06-24 PROCEDURE — 95117 IMMUNOTHERAPY INJECTIONS: CPT | Performed by: NURSE PRACTITIONER

## 2022-06-24 RX ADMIN — TESTOSTERONE CYPIONATE 300 MG: 200 INJECTION, SOLUTION INTRAMUSCULAR at 13:08

## 2022-07-06 ENCOUNTER — TELEPHONE (OUTPATIENT)
Dept: FAMILY MEDICINE CLINIC | Age: 72
End: 2022-07-06

## 2022-07-08 ENCOUNTER — CLINICAL SUPPORT (OUTPATIENT)
Dept: FAMILY MEDICINE CLINIC | Age: 72
End: 2022-07-08

## 2022-07-08 VITALS — DIASTOLIC BLOOD PRESSURE: 86 MMHG | SYSTOLIC BLOOD PRESSURE: 144 MMHG | HEART RATE: 82 BPM

## 2022-07-08 DIAGNOSIS — J30.9 ALLERGIC RHINITIS, UNSPECIFIED SEASONALITY, UNSPECIFIED TRIGGER: Primary | ICD-10-CM

## 2022-07-08 DIAGNOSIS — E29.1 TESTICULAR HYPOFUNCTION: ICD-10-CM

## 2022-07-08 PROCEDURE — 96372 THER/PROPH/DIAG INJ SC/IM: CPT | Performed by: NURSE PRACTITIONER

## 2022-07-08 PROCEDURE — 95117 IMMUNOTHERAPY INJECTIONS: CPT | Performed by: NURSE PRACTITIONER

## 2022-07-08 RX ADMIN — TESTOSTERONE CYPIONATE 300 MG: 200 INJECTION, SOLUTION INTRAMUSCULAR at 12:58

## 2022-07-12 ENCOUNTER — CLINICAL SUPPORT (OUTPATIENT)
Dept: FAMILY MEDICINE CLINIC | Age: 72
End: 2022-07-12

## 2022-07-12 DIAGNOSIS — J30.9 ALLERGIC RHINITIS, UNSPECIFIED SEASONALITY, UNSPECIFIED TRIGGER: Primary | ICD-10-CM

## 2022-07-12 PROCEDURE — 95117 IMMUNOTHERAPY INJECTIONS: CPT | Performed by: FAMILY MEDICINE

## 2022-07-12 NOTE — PROGRESS NOTES
I have reviewed the notes, assessments, and/or procedures performed by Benita aGleano LPN, and I concur with her documentation of Cesario Dueñas.

## 2022-07-20 ENCOUNTER — CLINICAL SUPPORT (OUTPATIENT)
Dept: FAMILY MEDICINE CLINIC | Age: 72
End: 2022-07-20

## 2022-07-20 DIAGNOSIS — E34.9 TESTOSTERONE DEFICIENCY: ICD-10-CM

## 2022-07-20 DIAGNOSIS — J30.9 ALLERGIC RHINITIS, UNSPECIFIED SEASONALITY, UNSPECIFIED TRIGGER: Primary | ICD-10-CM

## 2022-07-20 PROCEDURE — 95117 IMMUNOTHERAPY INJECTIONS: CPT | Performed by: FAMILY MEDICINE

## 2022-07-20 PROCEDURE — 96372 THER/PROPH/DIAG INJ SC/IM: CPT | Performed by: FAMILY MEDICINE

## 2022-07-20 RX ADMIN — TESTOSTERONE CYPIONATE 300 MG: 200 INJECTION, SOLUTION INTRAMUSCULAR at 12:42

## 2022-08-03 ENCOUNTER — CLINICAL SUPPORT (OUTPATIENT)
Dept: FAMILY MEDICINE CLINIC | Age: 72
End: 2022-08-03

## 2022-08-03 ENCOUNTER — HOSPITAL ENCOUNTER (OUTPATIENT)
Dept: GENERAL RADIOLOGY | Facility: HOSPITAL | Age: 72
Discharge: HOME | End: 2022-08-03
Admitting: NURSE PRACTITIONER

## 2022-08-03 DIAGNOSIS — M19.019 ARTHRITIS OF SHOULDER: Primary | ICD-10-CM

## 2022-08-03 DIAGNOSIS — M25.512 ACUTE PAIN OF LEFT SHOULDER: ICD-10-CM

## 2022-08-03 DIAGNOSIS — J30.9 ALLERGIC RHINITIS, UNSPECIFIED SEASONALITY, UNSPECIFIED TRIGGER: Primary | ICD-10-CM

## 2022-08-03 PROCEDURE — 95117 IMMUNOTHERAPY INJECTIONS: CPT | Performed by: FAMILY MEDICINE

## 2022-08-03 PROCEDURE — 73030 X-RAY EXAM OF SHOULDER: CPT

## 2022-08-09 ENCOUNTER — PRIOR AUTHORIZATION (OUTPATIENT)
Dept: FAMILY MEDICINE CLINIC | Age: 72
End: 2022-08-09

## 2022-08-12 ENCOUNTER — CLINICAL SUPPORT (OUTPATIENT)
Dept: FAMILY MEDICINE CLINIC | Age: 72
End: 2022-08-12

## 2022-08-12 DIAGNOSIS — J30.9 ALLERGIC RHINITIS, UNSPECIFIED SEASONALITY, UNSPECIFIED TRIGGER: Primary | ICD-10-CM

## 2022-08-12 PROCEDURE — 95117 IMMUNOTHERAPY INJECTIONS: CPT | Performed by: FAMILY MEDICINE

## 2022-08-25 ENCOUNTER — CLINICAL SUPPORT (OUTPATIENT)
Dept: FAMILY MEDICINE CLINIC | Age: 72
End: 2022-08-25

## 2022-08-25 VITALS — DIASTOLIC BLOOD PRESSURE: 76 MMHG | HEART RATE: 104 BPM | SYSTOLIC BLOOD PRESSURE: 135 MMHG

## 2022-08-25 DIAGNOSIS — E34.9 TESTOSTERONE DEFICIENCY: ICD-10-CM

## 2022-08-25 DIAGNOSIS — J30.9 ALLERGIC RHINITIS, UNSPECIFIED SEASONALITY, UNSPECIFIED TRIGGER: Primary | ICD-10-CM

## 2022-08-25 PROCEDURE — 95117 IMMUNOTHERAPY INJECTIONS: CPT | Performed by: FAMILY MEDICINE

## 2022-08-25 PROCEDURE — 96372 THER/PROPH/DIAG INJ SC/IM: CPT | Performed by: FAMILY MEDICINE

## 2022-08-25 RX ADMIN — TESTOSTERONE CYPIONATE 300 MG: 200 INJECTION, SOLUTION INTRAMUSCULAR at 13:04

## 2022-09-06 ENCOUNTER — OFFICE VISIT (OUTPATIENT)
Dept: NEUROLOGY | Facility: CLINIC | Age: 72
End: 2022-09-06

## 2022-09-06 VITALS
HEIGHT: 69 IN | DIASTOLIC BLOOD PRESSURE: 82 MMHG | SYSTOLIC BLOOD PRESSURE: 146 MMHG | HEART RATE: 72 BPM | WEIGHT: 195.5 LBS | BODY MASS INDEX: 28.96 KG/M2

## 2022-09-06 DIAGNOSIS — G25.0 BENIGN ESSENTIAL TREMOR: Primary | ICD-10-CM

## 2022-09-06 PROCEDURE — 99214 OFFICE O/P EST MOD 30 MIN: CPT | Performed by: NURSE PRACTITIONER

## 2022-09-06 RX ORDER — PRIMIDONE 250 MG/1
TABLET ORAL
Qty: 45 TABLET | Refills: 3 | Status: SHIPPED | OUTPATIENT
Start: 2022-09-06 | End: 2022-12-13 | Stop reason: SDUPTHER

## 2022-09-06 NOTE — PROGRESS NOTES
"Chief Complaint  Tremors    Subjective          Cesario Dueñas presents to [facility]OLOG[facility]GERY  States he's seen some mild improvement in tremor with primidone.  Taking 250mg qHS.  Denies side effects.  Is tolerating medicine well.      Interval History:   States he's had tremor to bilateral hands for several years.  Worsens with intention.  Noticed it yesterday while holding a coffee pot.  Worse with nervous or being cold.  No falls.  Denies freezing gait.  Sometimes feel he's having some jaw tremor. Has previously been on primidone and propranolol before.        Objective   Vital Signs:   /82   Pulse 72   Ht 175.3 cm (69\")   Wt 88.7 kg (195 lb 8 oz)   BMI 28.87 kg/m²     Physical Exam  Neurological:      Mental Status: He is oriented to person, place, and time.      Gait: Gait is intact.      Deep Tendon Reflexes: Strength normal.        Neurologic Exam     Mental Status   Oriented to person, place, and time.     Cranial Nerves   Cranial nerves II through XII intact.     Motor Exam   Muscle bulk: normal    Strength   Strength 5/5 throughout.     Gait, Coordination, and Reflexes     Gait  Gait: normal    Tremor   Intention tremor: present (Bilateral hands.)No bradykinesia or rigidity noted.        Result Review :               Assessment and Plan    Diagnoses and all orders for this visit:    1. Benign essential tremor (Primary)  Assessment & Plan:  Continue primidone 250mg qHS.  Will add half tablet in AM for increased tremor control.       Other orders  -     primidone (MYSOLINE) 250 MG tablet; 1/2 tablet in the morning, 1 tablet at night.  Dispense: 45 tablet; Refill: 3      Follow Up {Instructions Charge Capture  Follow-up Communications :23}  Return in about 3 months (around 12/6/2022) for tremor f/u.  Patient was given instructions and counseling regarding his condition or for health maintenance advice. Please see specific information pulled into the AVS if " What Type Of Note Output Would You Prefer (Optional)?: Bullet Format Hpi Title: Evaluation of Skin Lesions appropriate.        How Severe Are Your Spot(S)?: mild Have Your Spot(S) Been Treated In The Past?: has not been treated No

## 2022-09-07 ENCOUNTER — OFFICE VISIT (OUTPATIENT)
Dept: FAMILY MEDICINE CLINIC | Age: 72
End: 2022-09-07

## 2022-09-07 VITALS
HEIGHT: 69 IN | DIASTOLIC BLOOD PRESSURE: 79 MMHG | BODY MASS INDEX: 28.58 KG/M2 | TEMPERATURE: 98.8 F | SYSTOLIC BLOOD PRESSURE: 120 MMHG | WEIGHT: 193 LBS | HEART RATE: 80 BPM

## 2022-09-07 DIAGNOSIS — I10 PRIMARY HYPERTENSION: Primary | ICD-10-CM

## 2022-09-07 DIAGNOSIS — E78.2 MIXED HYPERLIPIDEMIA: ICD-10-CM

## 2022-09-07 DIAGNOSIS — E34.9 TESTOSTERONE DEFICIENCY: ICD-10-CM

## 2022-09-07 PROCEDURE — 95117 IMMUNOTHERAPY INJECTIONS: CPT | Performed by: NURSE PRACTITIONER

## 2022-09-07 PROCEDURE — 99214 OFFICE O/P EST MOD 30 MIN: CPT | Performed by: NURSE PRACTITIONER

## 2022-09-07 RX ORDER — TESTOSTERONE CYPIONATE 200 MG/ML
INJECTION, SOLUTION INTRAMUSCULAR
Qty: 4 ML | Refills: 1 | Status: SHIPPED | OUTPATIENT
Start: 2022-09-07 | End: 2022-10-07

## 2022-09-07 RX ORDER — ATORVASTATIN CALCIUM 20 MG/1
20 TABLET, FILM COATED ORAL DAILY
Qty: 90 TABLET | Refills: 0 | Status: SHIPPED | OUTPATIENT
Start: 2022-09-07 | End: 2022-12-14 | Stop reason: SDUPTHER

## 2022-09-07 RX ORDER — LISINOPRIL 20 MG/1
20 TABLET ORAL DAILY
Qty: 90 TABLET | Refills: 0 | Status: SHIPPED | OUTPATIENT
Start: 2022-09-07 | End: 2022-12-14 | Stop reason: SDUPTHER

## 2022-09-07 RX ADMIN — TESTOSTERONE CYPIONATE 300 MG: 200 INJECTION, SOLUTION INTRAMUSCULAR at 12:19

## 2022-09-07 NOTE — ASSESSMENT & PLAN NOTE
Controlled substance documentation: FLAVIA reviewed; drug screen previously performed and appropriate; consent is reviewed and signed and on the chart.  Is aware of risk of addiction on this medication, understands will need to follow up for a review at least every 3 months and medications will be adjusted or decreased as deemed appropriate at each visit.  No history of drug or alcohol abuse.  No concerns about diversion or abuse. Denies side effects related to the medication.  Aware may be called in for pill counts.  The dosing of this medication will be reviewed on a regular basis and reduced if possible.

## 2022-09-07 NOTE — PROGRESS NOTES
Chief Complaint  Cesario Dueñas presents to [facility] for Hypertension (3 month follow up )    Subjective          History of Present Illness    Bill continues on atorvastatin for hyperlipidemia and lisinopril for HTN. Lisinopril dose was increased 6/6/22. Has noticed improvement in BP since.   He has seen neurology for benign essential tremors. He is on primidone.  He is also on testosterone injections for testosterone deficiency.  He sees Dr Clive Singh psychiatry for depression/anxiety. He is on pristiq, gabapentin, and xanax.  He had previously noted some problems with diarrhea. Worse with eating. Had colonoscopy. Had normal gallbladder US. He was treated for what sounds like H pylori. He was treated with 3 medications for 2 weeks. He notes that he has not had any diarrhea for the past 7-8 days.     Review of Systems         No Known Allergies   Past Medical History:   Diagnosis Date   • Hypertension      Current Outpatient Medications   Medication Sig Dispense Refill   • ALPRAZolam (XANAX) 0.5 MG tablet Take 1 tablet by mouth At Night As Needed.     • atorvastatin (LIPITOR) 20 MG tablet Take 1 tablet by mouth Daily. 90 tablet 0   • cholecalciferol (VITAMIN D3) 25 MCG (1000 UT) tablet Take 1,000 Units by mouth Daily.     • desvenlafaxine (PRISTIQ) 50 MG 24 hr tablet Take 50 mg by mouth Daily.     • gabapentin (NEURONTIN) 600 MG tablet Take 1 tablet by mouth 2 (two) times a day.     • levocetirizine (XYZAL) 5 MG tablet Take  by mouth As Needed.     • lisinopril (PRINIVIL,ZESTRIL) 20 MG tablet Take 1 tablet by mouth Daily. 90 tablet 0   • montelukast (SINGULAIR) 10 MG tablet Take 10 mg by mouth As Needed.     • multivitamin with minerals tablet tablet Take 1 tablet by mouth Daily.     • primidone (MYSOLINE) 250 MG tablet 1/2 tablet in the morning, 1 tablet at night. 45 tablet 3   • sildenafil (VIAGRA) 100 MG tablet Take 1 tablet by mouth As Needed for Erectile Dysfunction. 30  "tablet 0   • Testosterone Cypionate (DEPOTESTOTERONE CYPIONATE) 200 MG/ML injection INJECT 1.5ML INTRAMUSCULARLY AS DIRECTED EVERY 14 DAYS 4 mL 1     Current Facility-Administered Medications   Medication Dose Route Frequency Provider Last Rate Last Admin   • Testosterone Cypionate (DEPOTESTOTERONE CYPIONATE) injection 300 mg  300 mg Intramuscular Q14 Days Lenka Milligan APRN   300 mg at 22 1219     History reviewed. No pertinent surgical history.   Social History     Tobacco Use   • Smoking status: Former Smoker     Types: Cigarettes     Quit date:      Years since quittin.7   • Smokeless tobacco: Never Used   Vaping Use   • Vaping Use: Never used   Substance Use Topics   • Alcohol use: Yes     Comment: whiskey daily   • Drug use: Never     History reviewed. No pertinent family history.  There are no preventive care reminders to display for this patient.   Immunization History   Administered Date(s) Administered   • COVID-19 (MODERNA) 1st, 2nd, 3rd Dose Only 2021, 2021   • COVID-19 (PFIZER) PURPLE CAP 12/10/2021   • FLUAD TRI 65YR+ 10/21/2019, 10/09/2020   • Fluzone High-Dose 65+yrs 2021   • Fluzone Quad >6mos (Multi-dose) 10/09/2020   • Hepatitis A 2018, 2018   • Pneumococcal Conjugate 13-Valent (PCV13) 10/21/2015   • Pneumococcal Polysaccharide (PPSV23) 2015   • Td 2012        Objective     Vitals:    22 1137   BP: 120/79   BP Location: Left arm   Patient Position: Sitting   Pulse: 80   Temp: 98.8 °F (37.1 °C)   TempSrc: Oral   Weight: 87.5 kg (193 lb)   Height: 175.3 cm (69\")     Body mass index is 28.5 kg/m².     Physical Exam  Vitals reviewed.   Constitutional:       General: He is not in acute distress.     Appearance: Normal appearance. He is well-developed.   HENT:      Head: Normocephalic and atraumatic.   Cardiovascular:      Rate and Rhythm: Normal rate and regular rhythm.   Pulmonary:      Effort: Pulmonary effort is normal.      Breath " sounds: Normal breath sounds.   Neurological:      Mental Status: He is alert and oriented to person, place, and time.   Psychiatric:         Mood and Affect: Mood and affect normal.           Result Review :     The following data was reviewed by: HUNG Reynolds on 09/07/2022:          TSH (03/17/2022 08:29)  Vitamin B12 (03/17/2022 08:29)  Vitamin D 25 hydroxy (03/17/2022 08:29)  Testosterone (03/17/2022 08:29)  Lipid Panel (03/17/2022 08:29)  Comprehensive Metabolic Panel (03/17/2022 08:29)  CBC & Differential (03/17/2022 08:29)  Hepatitis C antibody (03/17/2022 08:29)                  Assessment and Plan      Diagnoses and all orders for this visit:    1. Primary hypertension (Primary)  Comments:  Improvement in BP. Continue current dose of medication  Orders:  -     lisinopril (PRINIVIL,ZESTRIL) 20 MG tablet; Take 1 tablet by mouth Daily.  Dispense: 90 tablet; Refill: 0    2. Mixed hyperlipidemia  Comments:  Medical condition is stable.  Continue same therapy.  Will recheck at next regular appointment.  Orders:  -     atorvastatin (LIPITOR) 20 MG tablet; Take 1 tablet by mouth Daily.  Dispense: 90 tablet; Refill: 0    3. Testosterone deficiency  Assessment & Plan:  Controlled substance documentation: FLAVIA reviewed; drug screen previously performed and appropriate; consent is reviewed and signed and on the chart.  Is aware of risk of addiction on this medication, understands will need to follow up for a review at least every 3 months and medications will be adjusted or decreased as deemed appropriate at each visit.  No history of drug or alcohol abuse.  No concerns about diversion or abuse. Denies side effects related to the medication.  Aware may be called in for pill counts.  The dosing of this medication will be reviewed on a regular basis and reduced if possible.       Orders:  -     Testosterone; Future  -     Comprehensive metabolic panel; Future  -     Allergy Serum Injection  -     Allergy Serum  Injection  -     Testosterone Cypionate (DEPOTESTOTERONE CYPIONATE) 200 MG/ML injection; INJECT 1.5ML INTRAMUSCULARLY AS DIRECTED EVERY 14 DAYS  Dispense: 4 mL; Refill: 1            Follow Up     Return in about 3 months (around 12/7/2022) for Recheck.

## 2022-09-28 ENCOUNTER — TELEPHONE (OUTPATIENT)
Dept: FAMILY MEDICINE CLINIC | Age: 72
End: 2022-09-28

## 2022-09-30 ENCOUNTER — CLINICAL SUPPORT (OUTPATIENT)
Dept: FAMILY MEDICINE CLINIC | Age: 72
End: 2022-09-30

## 2022-09-30 VITALS — SYSTOLIC BLOOD PRESSURE: 151 MMHG | DIASTOLIC BLOOD PRESSURE: 87 MMHG | HEART RATE: 80 BPM

## 2022-09-30 DIAGNOSIS — J30.9 ALLERGIC RHINITIS, UNSPECIFIED SEASONALITY, UNSPECIFIED TRIGGER: ICD-10-CM

## 2022-09-30 PROCEDURE — 95117 IMMUNOTHERAPY INJECTIONS: CPT | Performed by: FAMILY MEDICINE

## 2022-09-30 PROCEDURE — 96372 THER/PROPH/DIAG INJ SC/IM: CPT | Performed by: FAMILY MEDICINE

## 2022-09-30 RX ADMIN — TESTOSTERONE CYPIONATE 300 MG: 200 INJECTION, SOLUTION INTRAMUSCULAR at 13:51

## 2022-10-06 ENCOUNTER — LAB (OUTPATIENT)
Dept: LAB | Facility: HOSPITAL | Age: 72
End: 2022-10-06

## 2022-10-06 ENCOUNTER — CLINICAL SUPPORT (OUTPATIENT)
Dept: FAMILY MEDICINE CLINIC | Age: 72
End: 2022-10-06

## 2022-10-06 DIAGNOSIS — E34.9 TESTOSTERONE DEFICIENCY: ICD-10-CM

## 2022-10-06 DIAGNOSIS — J30.9 ALLERGIC RHINITIS, UNSPECIFIED SEASONALITY, UNSPECIFIED TRIGGER: Primary | ICD-10-CM

## 2022-10-06 DIAGNOSIS — Z12.5 SCREENING FOR MALIGNANT NEOPLASM OF PROSTATE: ICD-10-CM

## 2022-10-06 LAB
ALBUMIN SERPL-MCNC: 4.4 G/DL (ref 3.5–5.2)
ALBUMIN/GLOB SERPL: 1.9 G/DL
ALP SERPL-CCNC: 70 U/L (ref 39–117)
ALT SERPL W P-5'-P-CCNC: 37 U/L (ref 1–41)
ANION GAP SERPL CALCULATED.3IONS-SCNC: 8 MMOL/L (ref 5–15)
AST SERPL-CCNC: 48 U/L (ref 1–40)
BILIRUB SERPL-MCNC: 0.3 MG/DL (ref 0–1.2)
BUN SERPL-MCNC: 11 MG/DL (ref 8–23)
BUN/CREAT SERPL: 10.7 (ref 7–25)
CALCIUM SPEC-SCNC: 8.7 MG/DL (ref 8.6–10.5)
CHLORIDE SERPL-SCNC: 102 MMOL/L (ref 98–107)
CO2 SERPL-SCNC: 28 MMOL/L (ref 22–29)
CREAT SERPL-MCNC: 1.03 MG/DL (ref 0.76–1.27)
EGFRCR SERPLBLD CKD-EPI 2021: 77.7 ML/MIN/1.73
GLOBULIN UR ELPH-MCNC: 2.3 GM/DL
GLUCOSE SERPL-MCNC: 95 MG/DL (ref 65–99)
POTASSIUM SERPL-SCNC: 4.1 MMOL/L (ref 3.5–5.2)
PROT SERPL-MCNC: 6.7 G/DL (ref 6–8.5)
SODIUM SERPL-SCNC: 138 MMOL/L (ref 136–145)
TESTOST SERPL-MCNC: >1500 NG/DL (ref 193–740)

## 2022-10-06 PROCEDURE — 80053 COMPREHEN METABOLIC PANEL: CPT

## 2022-10-06 PROCEDURE — 36415 COLL VENOUS BLD VENIPUNCTURE: CPT

## 2022-10-06 PROCEDURE — 84403 ASSAY OF TOTAL TESTOSTERONE: CPT

## 2022-10-06 PROCEDURE — 95117 IMMUNOTHERAPY INJECTIONS: CPT | Performed by: FAMILY MEDICINE

## 2022-10-06 PROCEDURE — G0103 PSA SCREENING: HCPCS

## 2022-10-07 DIAGNOSIS — E34.9 TESTOSTERONE DEFICIENCY: ICD-10-CM

## 2022-10-07 DIAGNOSIS — Z12.5 SCREENING FOR MALIGNANT NEOPLASM OF PROSTATE: Primary | ICD-10-CM

## 2022-10-07 LAB — PSA SERPL-MCNC: 0.95 NG/ML (ref 0–4)

## 2022-10-07 RX ORDER — TESTOSTERONE CYPIONATE 200 MG/ML
INJECTION, SOLUTION INTRAMUSCULAR
Qty: 4 ML | Refills: 0 | Status: SHIPPED | OUTPATIENT
Start: 2022-10-07 | End: 2022-12-14 | Stop reason: SDUPTHER

## 2022-10-07 RX ORDER — TESTOSTERONE CYPIONATE 200 MG/ML
200 INJECTION, SOLUTION INTRAMUSCULAR
Status: DISCONTINUED | OUTPATIENT
Start: 2022-10-12 | End: 2023-03-17

## 2022-10-14 ENCOUNTER — CLINICAL SUPPORT (OUTPATIENT)
Dept: FAMILY MEDICINE CLINIC | Age: 72
End: 2022-10-14

## 2022-10-14 VITALS — DIASTOLIC BLOOD PRESSURE: 86 MMHG | HEART RATE: 85 BPM | SYSTOLIC BLOOD PRESSURE: 139 MMHG

## 2022-10-14 DIAGNOSIS — J30.9 ALLERGIC RHINITIS, UNSPECIFIED SEASONALITY, UNSPECIFIED TRIGGER: Primary | ICD-10-CM

## 2022-10-14 DIAGNOSIS — E34.9 TESTOSTERONE DEFICIENCY: ICD-10-CM

## 2022-10-14 PROCEDURE — 96372 THER/PROPH/DIAG INJ SC/IM: CPT | Performed by: FAMILY MEDICINE

## 2022-10-14 PROCEDURE — 95117 IMMUNOTHERAPY INJECTIONS: CPT | Performed by: FAMILY MEDICINE

## 2022-10-14 RX ADMIN — TESTOSTERONE CYPIONATE 200 MG: 200 INJECTION, SOLUTION INTRAMUSCULAR at 12:25

## 2022-10-21 ENCOUNTER — CLINICAL SUPPORT (OUTPATIENT)
Dept: FAMILY MEDICINE CLINIC | Age: 72
End: 2022-10-21

## 2022-10-21 DIAGNOSIS — Z23 NEED FOR INFLUENZA VACCINATION: Primary | ICD-10-CM

## 2022-10-21 PROCEDURE — G0008 ADMIN INFLUENZA VIRUS VAC: HCPCS | Performed by: FAMILY MEDICINE

## 2022-10-21 PROCEDURE — 90662 IIV NO PRSV INCREASED AG IM: CPT | Performed by: FAMILY MEDICINE

## 2022-10-28 ENCOUNTER — CLINICAL SUPPORT (OUTPATIENT)
Dept: FAMILY MEDICINE CLINIC | Age: 72
End: 2022-10-28

## 2022-10-28 DIAGNOSIS — J30.9 ALLERGIC RHINITIS, UNSPECIFIED SEASONALITY, UNSPECIFIED TRIGGER: Primary | ICD-10-CM

## 2022-10-28 DIAGNOSIS — E34.9 TESTOSTERONE DEFICIENCY: ICD-10-CM

## 2022-10-28 PROCEDURE — 95117 IMMUNOTHERAPY INJECTIONS: CPT | Performed by: FAMILY MEDICINE

## 2022-10-28 PROCEDURE — 96372 THER/PROPH/DIAG INJ SC/IM: CPT | Performed by: FAMILY MEDICINE

## 2022-10-28 RX ADMIN — TESTOSTERONE CYPIONATE 200 MG: 200 INJECTION, SOLUTION INTRAMUSCULAR at 12:22

## 2022-11-08 ENCOUNTER — CLINICAL SUPPORT (OUTPATIENT)
Dept: FAMILY MEDICINE CLINIC | Age: 72
End: 2022-11-08

## 2022-11-08 DIAGNOSIS — J30.9 ALLERGIC RHINITIS, UNSPECIFIED SEASONALITY, UNSPECIFIED TRIGGER: Primary | ICD-10-CM

## 2022-11-08 PROCEDURE — 95117 IMMUNOTHERAPY INJECTIONS: CPT | Performed by: FAMILY MEDICINE

## 2022-11-16 ENCOUNTER — CLINICAL SUPPORT (OUTPATIENT)
Dept: FAMILY MEDICINE CLINIC | Age: 72
End: 2022-11-16

## 2022-11-16 VITALS — SYSTOLIC BLOOD PRESSURE: 133 MMHG | DIASTOLIC BLOOD PRESSURE: 84 MMHG | HEART RATE: 67 BPM

## 2022-11-16 DIAGNOSIS — J30.9 ALLERGIC RHINITIS, UNSPECIFIED SEASONALITY, UNSPECIFIED TRIGGER: Primary | ICD-10-CM

## 2022-11-16 DIAGNOSIS — E34.9 TESTOSTERONE DEFICIENCY: ICD-10-CM

## 2022-11-16 PROCEDURE — 95117 IMMUNOTHERAPY INJECTIONS: CPT | Performed by: FAMILY MEDICINE

## 2022-11-16 PROCEDURE — 96372 THER/PROPH/DIAG INJ SC/IM: CPT | Performed by: FAMILY MEDICINE

## 2022-11-16 RX ADMIN — TESTOSTERONE CYPIONATE 200 MG: 200 INJECTION, SOLUTION INTRAMUSCULAR at 13:42

## 2022-12-09 ENCOUNTER — CLINICAL SUPPORT (OUTPATIENT)
Dept: FAMILY MEDICINE CLINIC | Age: 72
End: 2022-12-09

## 2022-12-09 DIAGNOSIS — J30.9 ALLERGIC RHINITIS, UNSPECIFIED SEASONALITY, UNSPECIFIED TRIGGER: Primary | ICD-10-CM

## 2022-12-09 PROCEDURE — 96372 THER/PROPH/DIAG INJ SC/IM: CPT | Performed by: NURSE PRACTITIONER

## 2022-12-09 PROCEDURE — 95117 IMMUNOTHERAPY INJECTIONS: CPT | Performed by: NURSE PRACTITIONER

## 2022-12-09 RX ADMIN — TESTOSTERONE CYPIONATE 200 MG: 200 INJECTION, SOLUTION INTRAMUSCULAR at 13:43

## 2022-12-13 ENCOUNTER — OFFICE VISIT (OUTPATIENT)
Dept: NEUROLOGY | Facility: CLINIC | Age: 72
End: 2022-12-13

## 2022-12-13 VITALS
HEART RATE: 70 BPM | BODY MASS INDEX: 29.38 KG/M2 | HEIGHT: 69 IN | SYSTOLIC BLOOD PRESSURE: 125 MMHG | DIASTOLIC BLOOD PRESSURE: 79 MMHG | WEIGHT: 198.4 LBS

## 2022-12-13 DIAGNOSIS — G25.0 BENIGN ESSENTIAL TREMOR: Primary | ICD-10-CM

## 2022-12-13 PROCEDURE — 99213 OFFICE O/P EST LOW 20 MIN: CPT | Performed by: NURSE PRACTITIONER

## 2022-12-13 RX ORDER — PRIMIDONE 250 MG/1
TABLET ORAL
Qty: 135 TABLET | Refills: 1 | Status: SHIPPED | OUTPATIENT
Start: 2022-12-13

## 2022-12-13 NOTE — PROGRESS NOTES
"Chief Complaint  Tremors    Subjective          Cesario Dueñas presents to [facility]  History of Present Illness  States he's seen good improvement in tremor with  Increased primidone.  Taking 250mg qHS and 125mg qAM.  Denies side effects.  Is tolerating medicine well.      Interval History:   States he's had tremor to bilateral hands for several years.  Worsens with intention.  Noticed it yesterday while holding a coffee pot.  Worse with nervous or being cold.  No falls.  Denies freezing gait.  Sometimes feel he's having some jaw tremor. Has previously been on primidone and propranolol before.        Objective   Vital Signs:   /79   Pulse 70   Ht 175.3 cm (69\")   Wt 90 kg (198 lb 6.4 oz)   BMI 29.30 kg/m²     Physical Exam  Neurological:      Mental Status: He is oriented to person, place, and time.      Cranial Nerves: Cranial nerves 2-12 are intact.      Motor: Motor strength is normal.      Gait: Gait is intact.        Neurologic Exam     Mental Status   Oriented to person, place, and time.     Cranial Nerves   Cranial nerves II through XII intact.     Motor Exam   Muscle bulk: normal    Strength   Strength 5/5 throughout.     Gait, Coordination, and Reflexes     Gait  Gait: normal    Tremor   Intention tremor: present (Bilateral hands.)No bradykinesia or rigidity noted.        Result Review :               Assessment and Plan    Diagnoses and all orders for this visit:    1. Benign essential tremor (Primary)  Assessment & Plan:  Continue primidone 1/2 tablet in the morning and 1 tablet at night      Other orders  -     primidone (MYSOLINE) 250 MG tablet; 1/2 tablet in the morning, 1 tablet at night.  Dispense: 135 tablet; Refill: 1      Follow Up   Return in about 6 months (around 6/13/2023) for tremor f/u.  Patient was given instructions and counseling regarding his condition or for health maintenance advice. Please see specific information pulled into " the AVS if appropriate.

## 2022-12-14 ENCOUNTER — OFFICE VISIT (OUTPATIENT)
Dept: FAMILY MEDICINE CLINIC | Age: 72
End: 2022-12-14

## 2022-12-14 VITALS
HEART RATE: 77 BPM | WEIGHT: 200 LBS | BODY MASS INDEX: 29.62 KG/M2 | HEIGHT: 69 IN | SYSTOLIC BLOOD PRESSURE: 117 MMHG | DIASTOLIC BLOOD PRESSURE: 85 MMHG | TEMPERATURE: 98.9 F

## 2022-12-14 DIAGNOSIS — I10 PRIMARY HYPERTENSION: Primary | ICD-10-CM

## 2022-12-14 DIAGNOSIS — Z79.899 LONG-TERM USE OF HIGH-RISK MEDICATION: ICD-10-CM

## 2022-12-14 DIAGNOSIS — J30.9 ALLERGIC RHINITIS, UNSPECIFIED SEASONALITY, UNSPECIFIED TRIGGER: ICD-10-CM

## 2022-12-14 DIAGNOSIS — E34.9 TESTOSTERONE DEFICIENCY: ICD-10-CM

## 2022-12-14 DIAGNOSIS — E78.2 MIXED HYPERLIPIDEMIA: ICD-10-CM

## 2022-12-14 PROCEDURE — 95117 IMMUNOTHERAPY INJECTIONS: CPT | Performed by: NURSE PRACTITIONER

## 2022-12-14 PROCEDURE — 99214 OFFICE O/P EST MOD 30 MIN: CPT | Performed by: NURSE PRACTITIONER

## 2022-12-14 PROCEDURE — 80305 DRUG TEST PRSMV DIR OPT OBS: CPT | Performed by: NURSE PRACTITIONER

## 2022-12-14 RX ORDER — ATORVASTATIN CALCIUM 20 MG/1
20 TABLET, FILM COATED ORAL DAILY
Qty: 90 TABLET | Refills: 1 | Status: SHIPPED | OUTPATIENT
Start: 2022-12-14 | End: 2023-03-17

## 2022-12-14 RX ORDER — LISINOPRIL 20 MG/1
20 TABLET ORAL DAILY
Qty: 90 TABLET | Refills: 1 | Status: SHIPPED | OUTPATIENT
Start: 2022-12-14

## 2022-12-14 RX ORDER — TESTOSTERONE CYPIONATE 200 MG/ML
INJECTION, SOLUTION INTRAMUSCULAR
Qty: 4 ML | Refills: 0 | Status: SHIPPED | OUTPATIENT
Start: 2022-12-14 | End: 2023-03-15 | Stop reason: SDUPTHER

## 2022-12-14 NOTE — PROGRESS NOTES
Chief Complaint  Cesario Dueñas presents to [facility] for Hypertension (3 month follow up )    Subjective          History of Present Illness    Bill continues on atorvastatin for hyperlipidemia and lisinopril for HTN. BP log reveals readings typically well controlled.   He has seen neurology for benign essential tremors. He is on primidone. Had follow up yesterday.  He is also on testosterone injections for testosterone deficiency. Dose was decreased after last labs showed elevation. He notes that he is feeling well.   He sees Dr Clive Singh psychiatry for depression/anxiety. He is on pristiq, gabapentin, and xanax.  He is going to be seeing Dr Erik wynn later today for left shoulder pain.       Review of Systems      No Known Allergies   Past Medical History:   Diagnosis Date   • Hypertension      Current Outpatient Medications   Medication Sig Dispense Refill   • ALPRAZolam (XANAX) 0.5 MG tablet Take 1 tablet by mouth At Night As Needed.     • atorvastatin (LIPITOR) 20 MG tablet Take 1 tablet by mouth Daily. 90 tablet 1   • cholecalciferol (VITAMIN D3) 25 MCG (1000 UT) tablet Take 1,000 Units by mouth Daily.     • desvenlafaxine (PRISTIQ) 50 MG 24 hr tablet Take 50 mg by mouth Daily.     • gabapentin (NEURONTIN) 600 MG tablet Take 1 tablet by mouth 2 (two) times a day.     • levocetirizine (XYZAL) 5 MG tablet Take  by mouth As Needed.     • lisinopril (PRINIVIL,ZESTRIL) 20 MG tablet Take 1 tablet by mouth Daily. 90 tablet 1   • montelukast (SINGULAIR) 10 MG tablet Take 10 mg by mouth As Needed.     • multivitamin with minerals tablet tablet Take 1 tablet by mouth Daily.     • primidone (MYSOLINE) 250 MG tablet 1/2 tablet in the morning, 1 tablet at night. 135 tablet 1   • sildenafil (VIAGRA) 100 MG tablet Take 1 tablet by mouth As Needed for Erectile Dysfunction. 30 tablet 0   • Testosterone Cypionate (DEPOTESTOTERONE CYPIONATE) 200 MG/ML injection INJECT 1 ML INTRAMUSCULARLY  "AS DIRECTED EVERY 14 DAYS 4 mL 0     Current Facility-Administered Medications   Medication Dose Route Frequency Provider Last Rate Last Admin   • Testosterone Cypionate (DEPOTESTOTERONE CYPIONATE) injection 200 mg  200 mg Intramuscular Q14 Days Lenka Milligan APRN   200 mg at 22 1343     History reviewed. No pertinent surgical history.   Social History     Tobacco Use   • Smoking status: Former     Types: Cigarettes     Quit date:      Years since quittin.9   • Smokeless tobacco: Never   Vaping Use   • Vaping Use: Never used   Substance Use Topics   • Alcohol use: Yes     Comment: whiskey daily   • Drug use: Never     History reviewed. No pertinent family history.  There are no preventive care reminders to display for this patient.   Immunization History   Administered Date(s) Administered   • COVID-19 (MODERNA) 1st, 2nd, 3rd Dose Only 2021, 2021   • COVID-19 (PFIZER) PURPLE CAP 12/10/2021   • FLUAD TRI 65YR+ 10/21/2019, 10/09/2020   • Fluzone High-Dose 65+yrs 2021, 10/21/2022   • Fluzone Quad >6mos (Multi-dose) 10/09/2020   • Hepatitis A 2018, 2018   • Pneumococcal Conjugate 13-Valent (PCV13) 10/21/2015   • Pneumococcal Polysaccharide (PPSV23) 2015   • Td 2012        Objective     Vitals:    22 1139   BP: 117/85   BP Location: Left arm   Patient Position: Sitting   Pulse: 77   Temp: 98.9 °F (37.2 °C)   TempSrc: Oral   Weight: 90.7 kg (200 lb)   Height: 175.3 cm (69\")     Body mass index is 29.53 kg/m².     Physical Exam  Vitals reviewed.   Constitutional:       General: He is not in acute distress.     Appearance: Normal appearance. He is well-developed.   HENT:      Head: Normocephalic and atraumatic.   Cardiovascular:      Rate and Rhythm: Normal rate and regular rhythm.   Pulmonary:      Effort: Pulmonary effort is normal.      Breath sounds: Normal breath sounds.   Neurological:      Mental Status: He is alert and oriented to person, place, and " time.   Psychiatric:         Mood and Affect: Mood and affect normal.           Result Review :                               Assessment and Plan      Diagnoses and all orders for this visit:    1. Primary hypertension (Primary)  Comments:  Medical condition is stable.  Continue same therapy.  Will recheck at next regular appointment  Orders:  -     lisinopril (PRINIVIL,ZESTRIL) 20 MG tablet; Take 1 tablet by mouth Daily.  Dispense: 90 tablet; Refill: 1    2. Mixed hyperlipidemia  Comments:  Medical condition is stable.  Continue same therapy.  Will recheck at next regular appointment.  Orders:  -     atorvastatin (LIPITOR) 20 MG tablet; Take 1 tablet by mouth Daily.  Dispense: 90 tablet; Refill: 1    3. Long-term use of high-risk medication  -     POC Urine Drug Screen [facility]    4. Allergic rhinitis, unspecified seasonality, unspecified trigger  -     Allergy Serum Injection  -     Allergy Serum Injection    5. Testosterone deficiency  Comments:  Plan for lab recheck at next visit.  Orders:  -     Testosterone Cypionate (DEPOTESTOTERONE CYPIONATE) 200 MG/ML injection; INJECT 1 ML INTRAMUSCULARLY AS DIRECTED EVERY 14 DAYS  Dispense: 4 mL; Refill: 0      Controlled substance documentation: FLAVIA reviewed; drug screen performed and appropriate; consent is reviewed and signed and on the chart.  Is aware of risk of addiction on this medication, understands will need to follow up for a review at least every 3 months and medications will be adjusted or decreased as deemed appropriate at each visit.  No history of drug or alcohol abuse.  No concerns about diversion or abuse. Denies side effects related to the medication.  Aware may be called in for pill counts.  The dosing of this medication will be reviewed on a regular basis and reduced if possible.           Follow Up     Return in about 3 months (around 3/14/2023) for Recheck.

## 2022-12-30 ENCOUNTER — CLINICAL SUPPORT (OUTPATIENT)
Dept: FAMILY MEDICINE CLINIC | Age: 72
End: 2022-12-30

## 2022-12-30 VITALS — HEART RATE: 66 BPM | SYSTOLIC BLOOD PRESSURE: 125 MMHG | DIASTOLIC BLOOD PRESSURE: 80 MMHG

## 2022-12-30 DIAGNOSIS — J30.9 ALLERGIC RHINITIS, UNSPECIFIED SEASONALITY, UNSPECIFIED TRIGGER: Primary | ICD-10-CM

## 2022-12-30 DIAGNOSIS — E34.9 TESTOSTERONE DEFICIENCY: ICD-10-CM

## 2022-12-30 PROCEDURE — 96372 THER/PROPH/DIAG INJ SC/IM: CPT | Performed by: FAMILY MEDICINE

## 2022-12-30 PROCEDURE — 95117 IMMUNOTHERAPY INJECTIONS: CPT | Performed by: FAMILY MEDICINE

## 2022-12-30 RX ADMIN — TESTOSTERONE CYPIONATE 200 MG: 200 INJECTION, SOLUTION INTRAMUSCULAR at 11:30

## 2023-01-09 ENCOUNTER — CLINICAL SUPPORT (OUTPATIENT)
Dept: FAMILY MEDICINE CLINIC | Age: 73
End: 2023-01-09
Payer: MEDICARE

## 2023-01-09 DIAGNOSIS — J30.9 ALLERGIC RHINITIS, UNSPECIFIED SEASONALITY, UNSPECIFIED TRIGGER: Primary | ICD-10-CM

## 2023-01-09 PROCEDURE — 96372 THER/PROPH/DIAG INJ SC/IM: CPT | Performed by: FAMILY MEDICINE

## 2023-01-09 PROCEDURE — 95117 IMMUNOTHERAPY INJECTIONS: CPT | Performed by: FAMILY MEDICINE

## 2023-01-09 RX ADMIN — TESTOSTERONE CYPIONATE 200 MG: 200 INJECTION, SOLUTION INTRAMUSCULAR at 12:56

## 2023-02-06 ENCOUNTER — CLINICAL SUPPORT (OUTPATIENT)
Dept: FAMILY MEDICINE CLINIC | Age: 73
End: 2023-02-06
Payer: MEDICARE

## 2023-02-06 DIAGNOSIS — J30.9 ALLERGIC RHINITIS, UNSPECIFIED SEASONALITY, UNSPECIFIED TRIGGER: Primary | ICD-10-CM

## 2023-02-06 DIAGNOSIS — E34.9 TESTOSTERONE DEFICIENCY: ICD-10-CM

## 2023-02-06 PROCEDURE — 95117 IMMUNOTHERAPY INJECTIONS: CPT | Performed by: FAMILY MEDICINE

## 2023-02-06 PROCEDURE — 96372 THER/PROPH/DIAG INJ SC/IM: CPT | Performed by: FAMILY MEDICINE

## 2023-02-06 RX ADMIN — TESTOSTERONE CYPIONATE 200 MG: 200 INJECTION, SOLUTION INTRAMUSCULAR at 13:02

## 2023-02-20 ENCOUNTER — CLINICAL SUPPORT (OUTPATIENT)
Dept: FAMILY MEDICINE CLINIC | Age: 73
End: 2023-02-20
Payer: MEDICARE

## 2023-02-20 VITALS — SYSTOLIC BLOOD PRESSURE: 137 MMHG | HEART RATE: 67 BPM | DIASTOLIC BLOOD PRESSURE: 85 MMHG

## 2023-02-20 DIAGNOSIS — E34.9 TESTOSTERONE DEFICIENCY: ICD-10-CM

## 2023-02-20 DIAGNOSIS — J30.9 ALLERGIC RHINITIS, UNSPECIFIED SEASONALITY, UNSPECIFIED TRIGGER: Primary | ICD-10-CM

## 2023-02-20 PROCEDURE — 96372 THER/PROPH/DIAG INJ SC/IM: CPT | Performed by: FAMILY MEDICINE

## 2023-02-20 PROCEDURE — 95117 IMMUNOTHERAPY INJECTIONS: CPT | Performed by: FAMILY MEDICINE

## 2023-02-20 RX ADMIN — TESTOSTERONE CYPIONATE 200 MG: 200 INJECTION, SOLUTION INTRAMUSCULAR at 13:42

## 2023-03-08 ENCOUNTER — CLINICAL SUPPORT (OUTPATIENT)
Dept: FAMILY MEDICINE CLINIC | Age: 73
End: 2023-03-08
Payer: MEDICARE

## 2023-03-08 VITALS — DIASTOLIC BLOOD PRESSURE: 87 MMHG | HEART RATE: 64 BPM | SYSTOLIC BLOOD PRESSURE: 144 MMHG

## 2023-03-08 DIAGNOSIS — E34.9 TESTOSTERONE DEFICIENCY: ICD-10-CM

## 2023-03-08 DIAGNOSIS — J30.9 ALLERGIC RHINITIS, UNSPECIFIED SEASONALITY, UNSPECIFIED TRIGGER: Primary | ICD-10-CM

## 2023-03-08 PROCEDURE — 96372 THER/PROPH/DIAG INJ SC/IM: CPT | Performed by: FAMILY MEDICINE

## 2023-03-08 PROCEDURE — 95117 IMMUNOTHERAPY INJECTIONS: CPT | Performed by: FAMILY MEDICINE

## 2023-03-08 RX ADMIN — TESTOSTERONE CYPIONATE 200 MG: 200 INJECTION, SOLUTION INTRAMUSCULAR at 11:02

## 2023-03-15 ENCOUNTER — OFFICE VISIT (OUTPATIENT)
Dept: FAMILY MEDICINE CLINIC | Age: 73
End: 2023-03-15
Payer: MEDICARE

## 2023-03-15 VITALS
SYSTOLIC BLOOD PRESSURE: 128 MMHG | TEMPERATURE: 98.5 F | HEART RATE: 72 BPM | DIASTOLIC BLOOD PRESSURE: 82 MMHG | HEIGHT: 69 IN | WEIGHT: 205 LBS | BODY MASS INDEX: 30.36 KG/M2

## 2023-03-15 DIAGNOSIS — J30.9 ALLERGIC RHINITIS, UNSPECIFIED SEASONALITY, UNSPECIFIED TRIGGER: ICD-10-CM

## 2023-03-15 DIAGNOSIS — R53.83 FATIGUE, UNSPECIFIED TYPE: ICD-10-CM

## 2023-03-15 DIAGNOSIS — Z79.899 HIGH RISK MEDICATION USE: ICD-10-CM

## 2023-03-15 DIAGNOSIS — E34.9 TESTOSTERONE DEFICIENCY: Primary | ICD-10-CM

## 2023-03-15 DIAGNOSIS — E78.2 MIXED HYPERLIPIDEMIA: ICD-10-CM

## 2023-03-15 DIAGNOSIS — E55.9 VITAMIN D DEFICIENCY: ICD-10-CM

## 2023-03-15 DIAGNOSIS — I10 PRIMARY HYPERTENSION: ICD-10-CM

## 2023-03-15 DIAGNOSIS — E34.9 TESTOSTERONE DEFICIENCY: ICD-10-CM

## 2023-03-15 PROCEDURE — 95117 IMMUNOTHERAPY INJECTIONS: CPT | Performed by: NURSE PRACTITIONER

## 2023-03-15 PROCEDURE — 99214 OFFICE O/P EST MOD 30 MIN: CPT | Performed by: NURSE PRACTITIONER

## 2023-03-15 PROCEDURE — 1159F MED LIST DOCD IN RCRD: CPT | Performed by: NURSE PRACTITIONER

## 2023-03-15 PROCEDURE — 3079F DIAST BP 80-89 MM HG: CPT | Performed by: NURSE PRACTITIONER

## 2023-03-15 PROCEDURE — 3074F SYST BP LT 130 MM HG: CPT | Performed by: NURSE PRACTITIONER

## 2023-03-15 RX ORDER — TESTOSTERONE CYPIONATE 200 MG/ML
INJECTION, SOLUTION INTRAMUSCULAR
Qty: 4 ML | Refills: 0 | Status: SHIPPED | OUTPATIENT
Start: 2023-03-15 | End: 2023-03-17

## 2023-03-15 NOTE — PROGRESS NOTES
Chief Complaint  Cesario Dueñas presents to [facility] for Hypertension (3 month follow up )    Subjective          History of Present Illness    Bill continues on atorvastatin for hyperlipidemia and lisinopril for HTN. BP log reveals readings typically well controlled.   He has seen neurology for benign essential tremors. He is on primidone.   He is also on testosterone injections for testosterone deficiency. He notes some recent fatigue. He denies other symptoms.  He sees Dr Clive Singh psychiatry for depression/anxiety. He is on pristiq, gabapentin, and xanax.  He has been seeing Dr Valencia for left shoulder pain, rotator cuff tear. Had injection.     Review of Systems        No Known Allergies   Past Medical History:   Diagnosis Date   • Hypertension      Current Outpatient Medications   Medication Sig Dispense Refill   • ALPRAZolam (XANAX) 0.5 MG tablet Take 1 tablet by mouth At Night As Needed.     • atorvastatin (LIPITOR) 20 MG tablet Take 1 tablet by mouth Daily. 90 tablet 1   • cholecalciferol (VITAMIN D3) 25 MCG (1000 UT) tablet Take 1 tablet by mouth Daily.     • desvenlafaxine (PRISTIQ) 50 MG 24 hr tablet Take 1 tablet by mouth Daily.     • gabapentin (NEURONTIN) 600 MG tablet Take 1 tablet by mouth 2 (two) times a day.     • levocetirizine (XYZAL) 5 MG tablet Take  by mouth As Needed.     • lisinopril (PRINIVIL,ZESTRIL) 20 MG tablet Take 1 tablet by mouth Daily. 90 tablet 1   • montelukast (SINGULAIR) 10 MG tablet Take 1 tablet by mouth Every Night.     • multivitamin with minerals tablet tablet Take 1 tablet by mouth Daily.     • primidone (MYSOLINE) 250 MG tablet 1/2 tablet in the morning, 1 tablet at night. 135 tablet 1   • sildenafil (VIAGRA) 100 MG tablet Take 1 tablet by mouth As Needed for Erectile Dysfunction. 30 tablet 0   • Testosterone Cypionate (DEPOTESTOTERONE CYPIONATE) 200 MG/ML injection INJECT 1 ML INTRAMUSCULARLY AS DIRECTED EVERY 14 DAYS 4 mL 0  "    Current Facility-Administered Medications   Medication Dose Route Frequency Provider Last Rate Last Admin   • Testosterone Cypionate (DEPOTESTOTERONE CYPIONATE) injection 200 mg  200 mg Intramuscular Q14 Days Lenka Milligan APRN   200 mg at 23 1102     History reviewed. No pertinent surgical history.   Social History     Tobacco Use   • Smoking status: Former     Types: Cigarettes     Quit date:      Years since quittin.2     Passive exposure: Past   • Smokeless tobacco: Never   Vaping Use   • Vaping Use: Never used   Substance Use Topics   • Alcohol use: Yes     Comment: whiskey daily   • Drug use: Never     History reviewed. No pertinent family history.  There are no preventive care reminders to display for this patient.   Immunization History   Administered Date(s) Administered   • COVID-19 (MODERNA) 1st, 2nd, 3rd Dose Only 2021, 2021   • COVID-19 (PFIZER) PURPLE CAP 12/10/2021   • FLUAD TRI 65YR+ 10/21/2019, 10/09/2020   • Fluzone High-Dose 65+yrs 2021, 10/21/2022   • Fluzone Quad >6mos (Multi-dose) 10/09/2020   • Hepatitis A 2018, 2018   • Pneumococcal Conjugate 13-Valent (PCV13) 10/21/2015   • Pneumococcal Polysaccharide (PPSV23) 2015   • Td 2012        Objective     Vitals:    03/15/23 1052   BP: 128/82   BP Location: Left arm   Patient Position: Sitting   Pulse: 72   Temp: 98.5 °F (36.9 °C)   TempSrc: Oral   Weight: 93 kg (205 lb)   Height: 175.3 cm (69\")     Body mass index is 30.27 kg/m².     Physical Exam  Vitals reviewed.   Constitutional:       General: He is not in acute distress.     Appearance: Normal appearance. He is well-developed.   HENT:      Head: Normocephalic and atraumatic.      Right Ear: Tympanic membrane and ear canal normal.      Left Ear: Tympanic membrane and ear canal normal.      Nose: Nose normal.      Mouth/Throat:      Mouth: Mucous membranes are moist.   Eyes:      Extraocular Movements: Extraocular movements intact. "      Pupils: Pupils are equal, round, and reactive to light.   Cardiovascular:      Rate and Rhythm: Normal rate and regular rhythm.   Pulmonary:      Effort: Pulmonary effort is normal.      Breath sounds: Normal breath sounds.   Neurological:      Mental Status: He is alert and oriented to person, place, and time.   Psychiatric:         Mood and Affect: Mood and affect normal.           Result Review :                               Assessment and Plan      Diagnoses and all orders for this visit:    1. Testosterone deficiency (Primary)  Comments:  Will return for early AM labs.  Orders:  -     Testosterone; Future  -     Testosterone Cypionate (DEPOTESTOTERONE CYPIONATE) 200 MG/ML injection; INJECT 1 ML INTRAMUSCULARLY AS DIRECTED EVERY 14 DAYS  Dispense: 4 mL; Refill: 0    2. Allergic rhinitis, unspecified seasonality, unspecified trigger  -     Allergy Serum Injection  -     Allergy Serum Injection    3. Fatigue, unspecified type  Comments:  As above, will return for labs.   Orders:  -     CBC No Differential; Future  -     Vitamin B12; Future  -     TSH; Future    4. Mixed hyperlipidemia  Comments:  Medical condition is stable.  Continue same therapy.  Will recheck at next regular appointment  Orders:  -     Comprehensive metabolic panel; Future  -     Lipid panel; Future    5. Testosterone deficiency  Comments:  Plan for lab recheck at next visit.  Orders:  -     Testosterone; Future  -     Testosterone Cypionate (DEPOTESTOTERONE CYPIONATE) 200 MG/ML injection; INJECT 1 ML INTRAMUSCULARLY AS DIRECTED EVERY 14 DAYS  Dispense: 4 mL; Refill: 0    6. Vitamin D deficiency  -     Vitamin D 25 hydroxy; Future    7. Primary hypertension  Comments:  Medical condition is stable.  Continue same therapy.  Will recheck at next regular appointment    8. High risk medication use      Controlled substance documentation: FLAVIA reviewed; drug screen reviewed and appropriate; consent is reviewed and signed and on the chart.  Is  aware of risk of addiction on this medication, understands will need to follow up for a review at least every 3 months and medications will be adjusted or decreased as deemed appropriate at each visit.  No history of drug or alcohol abuse.  No concerns about diversion or abuse. Denies side effects related to the medication.  Aware may be called in for pill counts.  The dosing of this medication will be reviewed on a regular basis and reduced if possible.         Follow Up     Return in about 3 months (around 6/15/2023) for Medicare Wellness.

## 2023-03-16 ENCOUNTER — LAB (OUTPATIENT)
Dept: LAB | Facility: HOSPITAL | Age: 73
End: 2023-03-16
Payer: MEDICARE

## 2023-03-16 DIAGNOSIS — E34.9 TESTOSTERONE DEFICIENCY: ICD-10-CM

## 2023-03-16 DIAGNOSIS — R53.83 FATIGUE, UNSPECIFIED TYPE: ICD-10-CM

## 2023-03-16 DIAGNOSIS — E78.2 MIXED HYPERLIPIDEMIA: ICD-10-CM

## 2023-03-16 DIAGNOSIS — E55.9 VITAMIN D DEFICIENCY: ICD-10-CM

## 2023-03-16 LAB
25(OH)D3 SERPL-MCNC: 42.5 NG/ML (ref 30–100)
ALBUMIN SERPL-MCNC: 4.3 G/DL (ref 3.5–5.2)
ALBUMIN/GLOB SERPL: 1.7 G/DL
ALP SERPL-CCNC: 83 U/L (ref 39–117)
ALT SERPL W P-5'-P-CCNC: 35 U/L (ref 1–41)
ANION GAP SERPL CALCULATED.3IONS-SCNC: 11.4 MMOL/L (ref 5–15)
AST SERPL-CCNC: 24 U/L (ref 1–40)
BILIRUB SERPL-MCNC: 0.3 MG/DL (ref 0–1.2)
BUN SERPL-MCNC: 10 MG/DL (ref 8–23)
BUN/CREAT SERPL: 9 (ref 7–25)
CALCIUM SPEC-SCNC: 9.3 MG/DL (ref 8.6–10.5)
CHLORIDE SERPL-SCNC: 104 MMOL/L (ref 98–107)
CHOLEST SERPL-MCNC: 193 MG/DL (ref 0–200)
CO2 SERPL-SCNC: 26.6 MMOL/L (ref 22–29)
CREAT SERPL-MCNC: 1.11 MG/DL (ref 0.76–1.27)
DEPRECATED RDW RBC AUTO: 56.1 FL (ref 37–54)
EGFRCR SERPLBLD CKD-EPI 2021: 71 ML/MIN/1.73
ERYTHROCYTE [DISTWIDTH] IN BLOOD BY AUTOMATED COUNT: 14.7 % (ref 12.3–15.4)
GLOBULIN UR ELPH-MCNC: 2.5 GM/DL
GLUCOSE SERPL-MCNC: 84 MG/DL (ref 65–99)
HCT VFR BLD AUTO: 43.4 % (ref 37.5–51)
HDLC SERPL-MCNC: 46 MG/DL (ref 40–60)
HGB BLD-MCNC: 14.5 G/DL (ref 13–17.7)
LDLC SERPL CALC-MCNC: 126 MG/DL (ref 0–100)
LDLC/HDLC SERPL: 2.69 {RATIO}
MCH RBC QN AUTO: 34 PG (ref 26.6–33)
MCHC RBC AUTO-ENTMCNC: 33.4 G/DL (ref 31.5–35.7)
MCV RBC AUTO: 101.9 FL (ref 79–97)
PLATELET # BLD AUTO: 180 10*3/MM3 (ref 140–450)
PMV BLD AUTO: 9.8 FL (ref 6–12)
POTASSIUM SERPL-SCNC: 4.2 MMOL/L (ref 3.5–5.2)
PROT SERPL-MCNC: 6.8 G/DL (ref 6–8.5)
RBC # BLD AUTO: 4.26 10*6/MM3 (ref 4.14–5.8)
SODIUM SERPL-SCNC: 142 MMOL/L (ref 136–145)
TESTOST SERPL-MCNC: 1343 NG/DL (ref 193–740)
TRIGL SERPL-MCNC: 117 MG/DL (ref 0–150)
TSH SERPL DL<=0.05 MIU/L-ACNC: 3.85 UIU/ML (ref 0.27–4.2)
VIT B12 BLD-MCNC: 337 PG/ML (ref 211–946)
VLDLC SERPL-MCNC: 21 MG/DL (ref 5–40)
WBC NRBC COR # BLD: 5.47 10*3/MM3 (ref 3.4–10.8)

## 2023-03-16 PROCEDURE — 82306 VITAMIN D 25 HYDROXY: CPT

## 2023-03-16 PROCEDURE — 82607 VITAMIN B-12: CPT

## 2023-03-16 PROCEDURE — 85027 COMPLETE CBC AUTOMATED: CPT

## 2023-03-16 PROCEDURE — 84403 ASSAY OF TOTAL TESTOSTERONE: CPT

## 2023-03-16 PROCEDURE — 80061 LIPID PANEL: CPT

## 2023-03-16 PROCEDURE — 36415 COLL VENOUS BLD VENIPUNCTURE: CPT

## 2023-03-16 PROCEDURE — 80053 COMPREHEN METABOLIC PANEL: CPT

## 2023-03-16 PROCEDURE — 84443 ASSAY THYROID STIM HORMONE: CPT

## 2023-03-17 DIAGNOSIS — E34.9 TESTOSTERONE DEFICIENCY: ICD-10-CM

## 2023-03-17 DIAGNOSIS — E78.2 MIXED HYPERLIPIDEMIA: ICD-10-CM

## 2023-03-17 RX ORDER — TESTOSTERONE CYPIONATE 200 MG/ML
INJECTION, SOLUTION INTRAMUSCULAR
Qty: 2 ML | Refills: 0 | Status: SHIPPED | OUTPATIENT
Start: 2023-03-17

## 2023-03-17 RX ORDER — ATORVASTATIN CALCIUM 40 MG/1
40 TABLET, FILM COATED ORAL NIGHTLY
Qty: 90 TABLET | Refills: 1 | Status: SHIPPED | OUTPATIENT
Start: 2023-03-17 | End: 2023-09-13

## 2023-03-23 ENCOUNTER — CLINICAL SUPPORT (OUTPATIENT)
Dept: FAMILY MEDICINE CLINIC | Age: 73
End: 2023-03-23
Payer: MEDICARE

## 2023-03-23 DIAGNOSIS — E34.9 TESTOSTERONE DEFICIENCY: Primary | ICD-10-CM

## 2023-03-23 DIAGNOSIS — J30.9 ALLERGIC RHINITIS, UNSPECIFIED SEASONALITY, UNSPECIFIED TRIGGER: ICD-10-CM

## 2023-03-23 DIAGNOSIS — R53.83 FATIGUE, UNSPECIFIED TYPE: ICD-10-CM

## 2023-03-23 LAB
EXPIRATION DATE: NORMAL
HETEROPH AB SER QL LA: NEGATIVE
INTERNAL CONTROL: NORMAL
Lab: NORMAL

## 2023-03-23 RX ORDER — TESTOSTERONE CYPIONATE 200 MG/ML
100 INJECTION, SOLUTION INTRAMUSCULAR
Status: SHIPPED | OUTPATIENT
Start: 2023-03-23

## 2023-03-23 RX ADMIN — TESTOSTERONE CYPIONATE 100 MG: 200 INJECTION, SOLUTION INTRAMUSCULAR at 12:57

## 2023-04-05 ENCOUNTER — CLINICAL SUPPORT (OUTPATIENT)
Dept: FAMILY MEDICINE CLINIC | Age: 73
End: 2023-04-05
Payer: MEDICARE

## 2023-04-05 VITALS — HEART RATE: 77 BPM | DIASTOLIC BLOOD PRESSURE: 78 MMHG | SYSTOLIC BLOOD PRESSURE: 123 MMHG

## 2023-04-05 DIAGNOSIS — J30.9 ALLERGIC RHINITIS, UNSPECIFIED SEASONALITY, UNSPECIFIED TRIGGER: Primary | ICD-10-CM

## 2023-04-05 DIAGNOSIS — E34.9 TESTOSTERONE DEFICIENCY: ICD-10-CM

## 2023-04-05 RX ADMIN — TESTOSTERONE CYPIONATE 100 MG: 200 INJECTION, SOLUTION INTRAMUSCULAR at 11:30

## 2023-04-12 ENCOUNTER — CLINICAL SUPPORT (OUTPATIENT)
Dept: FAMILY MEDICINE CLINIC | Age: 73
End: 2023-04-12
Payer: MEDICARE

## 2023-04-12 DIAGNOSIS — J30.9 ALLERGIC RHINITIS, UNSPECIFIED SEASONALITY, UNSPECIFIED TRIGGER: Primary | ICD-10-CM

## 2023-04-12 PROCEDURE — 95117 IMMUNOTHERAPY INJECTIONS: CPT | Performed by: FAMILY MEDICINE

## 2023-04-18 ENCOUNTER — CLINICAL SUPPORT (OUTPATIENT)
Dept: FAMILY MEDICINE CLINIC | Age: 73
End: 2023-04-18
Payer: MEDICARE

## 2023-04-18 DIAGNOSIS — E55.9 VITAMIN D DEFICIENCY: ICD-10-CM

## 2023-04-18 DIAGNOSIS — J30.9 ALLERGIC RHINITIS, UNSPECIFIED SEASONALITY, UNSPECIFIED TRIGGER: Primary | ICD-10-CM

## 2023-04-18 RX ADMIN — TESTOSTERONE CYPIONATE 100 MG: 200 INJECTION, SOLUTION INTRAMUSCULAR at 16:01

## 2023-05-05 ENCOUNTER — CLINICAL SUPPORT (OUTPATIENT)
Dept: FAMILY MEDICINE CLINIC | Age: 73
End: 2023-05-05
Payer: MEDICARE

## 2023-05-05 DIAGNOSIS — E34.9 TESTOSTERONE DEFICIENCY: ICD-10-CM

## 2023-05-05 DIAGNOSIS — J30.9 ALLERGIC RHINITIS, UNSPECIFIED SEASONALITY, UNSPECIFIED TRIGGER: Primary | ICD-10-CM

## 2023-05-05 RX ADMIN — TESTOSTERONE CYPIONATE 100 MG: 200 INJECTION, SOLUTION INTRAMUSCULAR at 13:28

## 2023-05-12 ENCOUNTER — CLINICAL SUPPORT (OUTPATIENT)
Dept: FAMILY MEDICINE CLINIC | Age: 73
End: 2023-05-12
Payer: MEDICARE

## 2023-05-12 DIAGNOSIS — J30.9 ALLERGIC RHINITIS, UNSPECIFIED SEASONALITY, UNSPECIFIED TRIGGER: Primary | ICD-10-CM

## 2023-05-12 PROCEDURE — 95117 IMMUNOTHERAPY INJECTIONS: CPT | Performed by: FAMILY MEDICINE

## 2023-05-19 ENCOUNTER — CLINICAL SUPPORT (OUTPATIENT)
Dept: FAMILY MEDICINE CLINIC | Age: 73
End: 2023-05-19
Payer: MEDICARE

## 2023-05-19 DIAGNOSIS — J30.9 ALLERGIC RHINITIS, UNSPECIFIED SEASONALITY, UNSPECIFIED TRIGGER: Primary | ICD-10-CM

## 2023-05-19 RX ADMIN — TESTOSTERONE CYPIONATE 100 MG: 200 INJECTION, SOLUTION INTRAMUSCULAR at 14:37

## 2023-05-29 DIAGNOSIS — E34.9 TESTOSTERONE DEFICIENCY: ICD-10-CM

## 2023-05-31 RX ORDER — TESTOSTERONE CYPIONATE 200 MG/ML
INJECTION, SOLUTION INTRAMUSCULAR
Qty: 2 ML | Refills: 0 | Status: SHIPPED | OUTPATIENT
Start: 2023-05-31 | End: 2023-06-01

## 2023-06-01 ENCOUNTER — CLINICAL SUPPORT (OUTPATIENT)
Dept: FAMILY MEDICINE CLINIC | Age: 73
End: 2023-06-01

## 2023-06-01 VITALS — SYSTOLIC BLOOD PRESSURE: 125 MMHG | HEART RATE: 70 BPM | DIASTOLIC BLOOD PRESSURE: 84 MMHG

## 2023-06-01 DIAGNOSIS — E34.9 TESTOSTERONE DEFICIENCY: ICD-10-CM

## 2023-06-01 DIAGNOSIS — J30.9 ALLERGIC RHINITIS, UNSPECIFIED SEASONALITY, UNSPECIFIED TRIGGER: Primary | ICD-10-CM

## 2023-06-01 RX ORDER — TESTOSTERONE CYPIONATE 200 MG/ML
INJECTION, SOLUTION INTRAMUSCULAR
Qty: 0.5 ML | Refills: 0 | Status: SHIPPED | OUTPATIENT
Start: 2023-06-01

## 2023-06-01 RX ADMIN — TESTOSTERONE CYPIONATE 100 MG: 200 INJECTION, SOLUTION INTRAMUSCULAR at 14:32

## 2023-06-13 ENCOUNTER — CLINICAL SUPPORT (OUTPATIENT)
Dept: FAMILY MEDICINE CLINIC | Age: 73
End: 2023-06-13
Payer: MEDICARE

## 2023-06-13 ENCOUNTER — OFFICE VISIT (OUTPATIENT)
Dept: NEUROLOGY | Facility: CLINIC | Age: 73
End: 2023-06-13
Payer: MEDICARE

## 2023-06-13 VITALS
SYSTOLIC BLOOD PRESSURE: 139 MMHG | BODY MASS INDEX: 29.36 KG/M2 | HEART RATE: 57 BPM | HEIGHT: 69 IN | DIASTOLIC BLOOD PRESSURE: 78 MMHG | WEIGHT: 198.2 LBS

## 2023-06-13 DIAGNOSIS — J30.9 ALLERGIC RHINITIS, UNSPECIFIED SEASONALITY, UNSPECIFIED TRIGGER: Primary | ICD-10-CM

## 2023-06-13 DIAGNOSIS — G25.0 BENIGN ESSENTIAL TREMOR: Primary | ICD-10-CM

## 2023-06-13 DIAGNOSIS — E34.9 TESTOSTERONE DEFICIENCY: ICD-10-CM

## 2023-06-13 PROCEDURE — 1159F MED LIST DOCD IN RCRD: CPT | Performed by: NURSE PRACTITIONER

## 2023-06-13 PROCEDURE — 3078F DIAST BP <80 MM HG: CPT | Performed by: NURSE PRACTITIONER

## 2023-06-13 PROCEDURE — 1160F RVW MEDS BY RX/DR IN RCRD: CPT | Performed by: NURSE PRACTITIONER

## 2023-06-13 PROCEDURE — 99213 OFFICE O/P EST LOW 20 MIN: CPT | Performed by: NURSE PRACTITIONER

## 2023-06-13 PROCEDURE — 3075F SYST BP GE 130 - 139MM HG: CPT | Performed by: NURSE PRACTITIONER

## 2023-06-13 RX ORDER — PRIMIDONE 250 MG/1
TABLET ORAL
Qty: 135 TABLET | Refills: 3 | Status: SHIPPED | OUTPATIENT
Start: 2023-06-13

## 2023-06-13 RX ADMIN — TESTOSTERONE CYPIONATE 100 MG: 200 INJECTION, SOLUTION INTRAMUSCULAR at 11:19

## 2023-06-13 NOTE — PROGRESS NOTES
"Chief Complaint  Tremors    Subjective          Cesario Dueñas presents to [facility]  History of Present Illness  States he's seen good improvement in tremor with  primidone.  Taking 250mg qHS and 125mg qAM.  Denies side effects.  Is tolerating medicine well.      Interval History:   States he's had tremor to bilateral hands for several years.  Worsens with intention.  Noticed it yesterday while holding a coffee pot.  Worse with nervous or being cold.  No falls.  Denies freezing gait.  Sometimes feel he's having some jaw tremor. Has previously been on primidone and propranolol before.      Objective   Vital Signs:   /78   Pulse 57   Ht 175.3 cm (69\")   Wt 89.9 kg (198 lb 3.2 oz)   BMI 29.27 kg/m²     Physical Exam  Neurological:      Mental Status: He is oriented to person, place, and time.      Cranial Nerves: Cranial nerves 2-12 are intact.      Motor: Motor strength is normal.      Gait: Gait is intact.      Neurologic Exam     Mental Status   Oriented to person, place, and time.     Cranial Nerves   Cranial nerves II through XII intact.     Motor Exam   Muscle bulk: normal    Strength   Strength 5/5 throughout.     Gait, Coordination, and Reflexes     Gait  Gait: normal    Tremor   Intention tremor: present (Bilateral hands.)No bradykinesia or rigidity noted.      Result Review :               Assessment and Plan    Diagnoses and all orders for this visit:    1. Benign essential tremor (Primary)  Assessment & Plan:  Continue primidone 1/2 tablet in the morning and 1 tablet at night      Other orders  -     primidone (MYSOLINE) 250 MG tablet; 1/2 tablet in the morning, 1 tablet at night.  Dispense: 135 tablet; Refill: 3        Follow Up   Return in about 1 year (around 6/13/2024) for tremor f/u.  Patient was given instructions and counseling regarding his condition or for health maintenance advice. Please see specific information pulled into the AVS if " appropriate.

## 2023-07-31 ENCOUNTER — CLINICAL SUPPORT (OUTPATIENT)
Dept: FAMILY MEDICINE CLINIC | Age: 73
End: 2023-07-31
Payer: MEDICARE

## 2023-07-31 VITALS — HEART RATE: 76 BPM | SYSTOLIC BLOOD PRESSURE: 123 MMHG | DIASTOLIC BLOOD PRESSURE: 72 MMHG

## 2023-07-31 DIAGNOSIS — J30.9 ALLERGIC RHINITIS, UNSPECIFIED SEASONALITY, UNSPECIFIED TRIGGER: Primary | ICD-10-CM

## 2023-07-31 DIAGNOSIS — E34.9 TESTOSTERONE DEFICIENCY: ICD-10-CM

## 2023-07-31 PROCEDURE — 95117 IMMUNOTHERAPY INJECTIONS: CPT | Performed by: FAMILY MEDICINE

## 2023-07-31 PROCEDURE — 96372 THER/PROPH/DIAG INJ SC/IM: CPT | Performed by: FAMILY MEDICINE

## 2023-07-31 RX ORDER — TESTOSTERONE CYPIONATE 200 MG/ML
INJECTION, SOLUTION INTRAMUSCULAR
Qty: 0.5 ML | Refills: 0 | Status: SHIPPED | OUTPATIENT
Start: 2023-07-31

## 2023-07-31 RX ADMIN — TESTOSTERONE CYPIONATE 100 MG: 200 INJECTION, SOLUTION INTRAMUSCULAR at 14:18

## 2023-08-09 DIAGNOSIS — E34.9 TESTOSTERONE DEFICIENCY: ICD-10-CM

## 2023-08-09 RX ORDER — TESTOSTERONE CYPIONATE 200 MG/ML
INJECTION, SOLUTION INTRAMUSCULAR
Qty: 1 ML | Refills: 0 | Status: SHIPPED | OUTPATIENT
Start: 2023-08-09

## 2023-08-10 ENCOUNTER — TELEPHONE (OUTPATIENT)
Dept: FAMILY MEDICINE CLINIC | Age: 73
End: 2023-08-10
Payer: MEDICARE

## 2023-09-07 ENCOUNTER — CLINICAL SUPPORT (OUTPATIENT)
Dept: FAMILY MEDICINE CLINIC | Age: 73
End: 2023-09-07
Payer: MEDICARE

## 2023-09-07 DIAGNOSIS — J30.9 ALLERGIC RHINITIS, UNSPECIFIED SEASONALITY, UNSPECIFIED TRIGGER: Primary | ICD-10-CM

## 2023-09-07 RX ADMIN — TESTOSTERONE CYPIONATE 100 MG: 200 INJECTION, SOLUTION INTRAMUSCULAR at 13:52

## 2023-09-22 ENCOUNTER — CLINICAL SUPPORT (OUTPATIENT)
Dept: FAMILY MEDICINE CLINIC | Age: 73
End: 2023-09-22
Payer: MEDICARE

## 2023-09-22 DIAGNOSIS — E78.2 MIXED HYPERLIPIDEMIA: ICD-10-CM

## 2023-09-22 DIAGNOSIS — J30.9 ALLERGIC RHINITIS, UNSPECIFIED SEASONALITY, UNSPECIFIED TRIGGER: Primary | ICD-10-CM

## 2023-09-22 DIAGNOSIS — E34.9 TESTOSTERONE DEFICIENCY: ICD-10-CM

## 2023-09-22 PROCEDURE — 95117 IMMUNOTHERAPY INJECTIONS: CPT | Performed by: FAMILY MEDICINE

## 2023-09-22 RX ORDER — TESTOSTERONE CYPIONATE 200 MG/ML
INJECTION, SOLUTION INTRAMUSCULAR
Qty: 2 ML | Refills: 0 | Status: SHIPPED | OUTPATIENT
Start: 2023-09-22

## 2023-09-22 RX ORDER — ATORVASTATIN CALCIUM 20 MG/1
TABLET, FILM COATED ORAL
Qty: 30 TABLET | OUTPATIENT
Start: 2023-09-22

## 2023-09-29 ENCOUNTER — OFFICE VISIT (OUTPATIENT)
Dept: FAMILY MEDICINE CLINIC | Age: 73
End: 2023-09-29
Payer: MEDICARE

## 2023-09-29 VITALS
HEART RATE: 75 BPM | BODY MASS INDEX: 29.74 KG/M2 | SYSTOLIC BLOOD PRESSURE: 130 MMHG | WEIGHT: 200.8 LBS | HEIGHT: 69 IN | OXYGEN SATURATION: 97 % | DIASTOLIC BLOOD PRESSURE: 82 MMHG

## 2023-09-29 DIAGNOSIS — R19.7 DIARRHEA, UNSPECIFIED TYPE: ICD-10-CM

## 2023-09-29 DIAGNOSIS — E78.2 MIXED HYPERLIPIDEMIA: ICD-10-CM

## 2023-09-29 DIAGNOSIS — E34.9 TESTOSTERONE DEFICIENCY: ICD-10-CM

## 2023-09-29 DIAGNOSIS — J30.9 ALLERGIC RHINITIS, UNSPECIFIED SEASONALITY, UNSPECIFIED TRIGGER: ICD-10-CM

## 2023-09-29 DIAGNOSIS — I10 PRIMARY HYPERTENSION: Primary | ICD-10-CM

## 2023-09-29 RX ORDER — TESTOSTERONE CYPIONATE 200 MG/ML
INJECTION, SOLUTION INTRAMUSCULAR
Qty: 2 ML | Refills: 0 | Status: SHIPPED | OUTPATIENT
Start: 2023-09-29 | End: 2023-09-29 | Stop reason: SDUPTHER

## 2023-09-29 RX ORDER — TESTOSTERONE CYPIONATE 200 MG/ML
INJECTION, SOLUTION INTRAMUSCULAR
Qty: 2 ML | Refills: 0 | Status: SHIPPED | OUTPATIENT
Start: 2023-09-29

## 2023-09-29 RX ORDER — TESTOSTERONE CYPIONATE 200 MG/ML
200 INJECTION, SOLUTION INTRAMUSCULAR
Status: SHIPPED | OUTPATIENT
Start: 2023-09-29 | End: 2023-11-28

## 2023-09-29 RX ORDER — LISINOPRIL 20 MG/1
20 TABLET ORAL DAILY
Qty: 90 TABLET | Refills: 0 | Status: SHIPPED | OUTPATIENT
Start: 2023-09-29

## 2023-09-29 RX ORDER — MONTELUKAST SODIUM 4 MG/1
1 TABLET, CHEWABLE ORAL DAILY
Qty: 90 TABLET | Refills: 0 | Status: SHIPPED | OUTPATIENT
Start: 2023-09-29

## 2023-09-29 RX ORDER — ATORVASTATIN CALCIUM 40 MG/1
40 TABLET, FILM COATED ORAL DAILY
Qty: 90 TABLET | Refills: 0 | Status: SHIPPED | OUTPATIENT
Start: 2023-09-29

## 2023-09-29 RX ADMIN — TESTOSTERONE CYPIONATE 200 MG: 200 INJECTION, SOLUTION INTRAMUSCULAR at 14:58

## 2023-09-29 NOTE — PROGRESS NOTES
Chief Complaint  Cesario Dueñas presents to [facility] for Hypertension (3 month f/u)    Subjective          History of Present Illness    Deshawn continues on atorvastatin for hyperlipidemia and lisinopril for HTN. Reports BP typically well controlled. He is not sure if he is taking new dose of cholesterol medication.  He has seen neurology for benign essential tremors. He is on primidone.   He is also on testosterone injections for testosterone deficiency.   He sees Dr Clive Singh psychiatry for depression/anxiety. He is on pristiq, gabapentin, and xanax.  Deshawn reports that he had colonoscopy 5/2022 with Dr Joseluis Pina. Diverticulosis. Prescribed antibiotic. Unsure of name. Helped for short time. He notes that he has BMs after eating. +diarrhea. Has tried citrucel without improvement. Imodium seems to help but was told not to take daily.        Review of Systems      No Known Allergies   Past Medical History:   Diagnosis Date    Hypertension      Current Outpatient Medications   Medication Sig Dispense Refill    ALPRAZolam (XANAX) 0.5 MG tablet Take 1 tablet by mouth At Night As Needed.      cholecalciferol (VITAMIN D3) 25 MCG (1000 UT) tablet Take 1 tablet by mouth Daily.      desvenlafaxine (PRISTIQ) 50 MG 24 hr tablet Take 1 tablet by mouth Daily.      gabapentin (NEURONTIN) 600 MG tablet Take 1 tablet by mouth 2 (two) times a day.      levocetirizine (XYZAL) 5 MG tablet Take  by mouth As Needed.      lisinopril (PRINIVIL,ZESTRIL) 20 MG tablet Take 1 tablet by mouth Daily. 90 tablet 0    montelukast (SINGULAIR) 10 MG tablet Take 1 tablet by mouth Every Night.      multivitamin with minerals tablet tablet Take 1 tablet by mouth Daily.      primidone (MYSOLINE) 250 MG tablet 1/2 tablet in the morning, 1 tablet at night. 135 tablet 3    sildenafil (VIAGRA) 100 MG tablet Take 1 tablet by mouth As Needed for Erectile Dysfunction. 30 tablet 0    Testosterone Cypionate  "(DEPOTESTOTERONE CYPIONATE) 200 MG/ML injection INJECT 1 ML INTRAMUSCULARY AS DIRECTED EVERY 30 days 2 mL 0    atorvastatin (LIPITOR) 40 MG tablet Take 1 tablet by mouth Daily. 90 tablet 0    colestipol (COLESTID) 1 g tablet Take 1 tablet by mouth Daily. 90 tablet 0     Current Facility-Administered Medications   Medication Dose Route Frequency Provider Last Rate Last Admin    Testosterone Cypionate (DEPOTESTOTERONE CYPIONATE) injection 200 mg  200 mg Intramuscular Q30 Days Lenka Milligan APRN   200 mg at 23 1458     History reviewed. No pertinent surgical history.   Social History     Tobacco Use    Smoking status: Former     Types: Cigarettes     Quit date:      Years since quittin.7     Passive exposure: Past    Smokeless tobacco: Never   Vaping Use    Vaping Use: Never used   Substance Use Topics    Alcohol use: Yes     Comment: whiskey daily    Drug use: Never     History reviewed. No pertinent family history.  Health Maintenance Due   Topic Date Due    ZOSTER VACCINE (1 of 2) Never done    COVID-19 Vaccine (4 - 2023-24 season) 2023        Immunization History   Administered Date(s) Administered    COVID-19 (MODERNA) 1st,2nd,3rd Dose Monovalent 2021, 2021    COVID-19 (PFIZER) Purple Cap Monovalent 12/10/2021    FLUAD TRI 65YR+ 10/21/2019, 10/09/2020    Fluzone High-Dose 65+yrs 2021, 10/21/2022    Fluzone Quad >6mos (Multi-dose) 10/09/2020    Hepatitis A 2018, 2018    Pneumococcal Conjugate 13-Valent (PCV13) 10/21/2015    Pneumococcal Polysaccharide (PPSV23) 2015    Td (TDVAX) 2012        Objective     Vitals:    23 1306   BP: 130/82   BP Location: Right arm   Patient Position: Sitting   Cuff Size: Large Adult   Pulse: 75   SpO2: 97%   Weight: 91.1 kg (200 lb 12.8 oz)   Height: 175.3 cm (69.02\")     Body mass index is 29.64 kg/m².             Physical Exam  Vitals reviewed.   Constitutional:       General: He is not in acute distress.     " Appearance: Normal appearance. He is well-developed.   HENT:      Head: Normocephalic and atraumatic.   Cardiovascular:      Rate and Rhythm: Normal rate and regular rhythm.   Pulmonary:      Effort: Pulmonary effort is normal.      Breath sounds: Normal breath sounds.   Abdominal:      General: Bowel sounds are normal.      Palpations: Abdomen is soft.      Tenderness: There is no abdominal tenderness.   Neurological:      Mental Status: He is alert and oriented to person, place, and time.   Psychiatric:         Mood and Affect: Mood and affect normal.         Result Review :                               Assessment and Plan      Diagnoses and all orders for this visit:    1. Primary hypertension (Primary)  Comments:  Medical condition is stable.  Continue same therapy.  Will recheck at next regular appointment  Orders:  -     lisinopril (PRINIVIL,ZESTRIL) 20 MG tablet; Take 1 tablet by mouth Daily.  Dispense: 90 tablet; Refill: 0    2. Mixed hyperlipidemia  Comments:  Holding off on labs as not sure if taking new dose of cholesterol medication. Will start taking new dose.  Orders:  -     atorvastatin (LIPITOR) 40 MG tablet; Take 1 tablet by mouth Daily.  Dispense: 90 tablet; Refill: 0    3. Testosterone deficiency  Comments:  Medical condition is stable.  Continue same therapy.  Will recheck at next regular appointment  Orders:  -     Discontinue: Testosterone Cypionate (DEPOTESTOTERONE CYPIONATE) 200 MG/ML injection; INJECT 0.5 ML INTRAMUSCULARY AS DIRECTED EVERY 14 DAYS  Dispense: 2 mL; Refill: 0  -     Testosterone Cypionate (DEPOTESTOTERONE CYPIONATE) 200 MG/ML injection; INJECT 1 ML INTRAMUSCULARY AS DIRECTED EVERY 30 days  Dispense: 2 mL; Refill: 0  -     Testosterone Cypionate (DEPOTESTOTERONE CYPIONATE) injection 200 mg    4. Allergic rhinitis, unspecified seasonality, unspecified trigger  -     Allergy Serum Injection  -     Allergy Serum Injection    5. Diarrhea, unspecified type  Comments:  Trial  colestipol.  Orders:  -     colestipol (COLESTID) 1 g tablet; Take 1 tablet by mouth Daily.  Dispense: 90 tablet; Refill: 0            Follow Up     Return in about 3 months (around 12/29/2023) for Recheck.

## 2023-09-29 NOTE — Clinical Note
Could you please sign the new Testosterone shot order so that it will be in there correctly. Thank you

## 2023-10-06 ENCOUNTER — CLINICAL SUPPORT (OUTPATIENT)
Dept: FAMILY MEDICINE CLINIC | Age: 73
End: 2023-10-06
Payer: MEDICARE

## 2023-10-06 ENCOUNTER — OFFICE VISIT (OUTPATIENT)
Dept: ORTHOPEDIC SURGERY | Facility: CLINIC | Age: 73
End: 2023-10-06
Payer: MEDICARE

## 2023-10-06 VITALS
SYSTOLIC BLOOD PRESSURE: 144 MMHG | BODY MASS INDEX: 29.92 KG/M2 | WEIGHT: 202 LBS | HEIGHT: 69 IN | DIASTOLIC BLOOD PRESSURE: 76 MMHG | HEART RATE: 71 BPM | OXYGEN SATURATION: 96 %

## 2023-10-06 DIAGNOSIS — J30.9 ALLERGIC RHINITIS, UNSPECIFIED SEASONALITY, UNSPECIFIED TRIGGER: Primary | ICD-10-CM

## 2023-10-06 DIAGNOSIS — M17.0 PRIMARY OSTEOARTHRITIS OF BOTH KNEES: Primary | ICD-10-CM

## 2023-10-06 PROCEDURE — 95117 IMMUNOTHERAPY INJECTIONS: CPT | Performed by: FAMILY MEDICINE

## 2023-10-06 NOTE — PROGRESS NOTES
"Chief Complaint  Pain and Initial Evaluation of the Left Knee and Pain and Initial Evaluation of the Right Knee    Subjective          Cesario Dueñas presents to [facility] for   History of Present Illness    Mr. Dueñas presents today for evaluation of his bilateral knees.  He reports a long history of bilateral knee pain.  He was a runner in the past.  He reports previous steroid injections many years ago in the bilateral knees which was very helpful.  He denies any new injuries.  He denies any mechanical symptoms.  He reports his knees are equal in terms of pain.  He has not had relief with over-the-counter anti-inflammatory medications.    No Known Allergies     Social History     Socioeconomic History    Marital status:    Tobacco Use    Smoking status: Former     Types: Cigarettes     Quit date:      Years since quittin.7     Passive exposure: Past    Smokeless tobacco: Never   Vaping Use    Vaping Use: Never used   Substance and Sexual Activity    Alcohol use: Yes     Comment: whiskey daily    Drug use: Never    Sexual activity: Defer        I reviewed the patient's chief complaint, history of present illness, review of systems, past medical history, surgical history, family history, social history, medications, and allergy list.     REVIEW OF SYSTEMS    Constitutional: Denies fevers, chills, weight loss  Cardiovascular: Denies chest pain, shortness of breath  Skin: Denies rashes, acute skin changes  Neurologic: Denies headache, loss of consciousness  MSK: Bilateral knee pain      Objective   Vital Signs:   /76   Pulse 71   Ht 175.3 cm (69\")   Wt 91.6 kg (202 lb)   SpO2 96%   BMI 29.83 kg/m²     Body mass index is 29.83 kg/m².    Physical Exam    General: Alert. No acute distress.   Bilateral lower extremities: No wounds.  No effusions.  Full extension to 125 of flexion with crepitus bilaterally.  Knee stable to varus valgus stress.  Knee stable to " Lachman and posterior drawer.  No pain with hip motion.  Distal neurovascular intact.    Procedures    Imaging Results (Most Recent)       None                     Assessment and Plan        No results found.     Diagnoses and all orders for this visit:    1. Primary osteoarthritis of both knees (Primary)        We reviewed his x-rays and discussed nonoperative management.  He will continue oral anti-inflammatories and home exercises.  We discussed the risk, benefits, indications, alternatives to bilateral knee steroid injections.  He elected to proceed and tolerated the injections well.  He may follow-up as needed going forward and can have repeat injections every 3 months as needed.        Call or return if worsening symptoms.    Scribed for Mahesh James MD by Lilibeth Arroyo  10/06/2023   11:25 EDT         Follow Up       PRN    Patient was given instructions and counseling regarding his condition or for health maintenance advice. Please see specific information pulled into the AVS if appropriate.       I have personally performed the services described in this document as scribed by the above individual and it is both accurate and complete.     Mahesh James MD  10/06/23  13:03 EDT

## 2023-10-13 ENCOUNTER — CLINICAL SUPPORT (OUTPATIENT)
Dept: FAMILY MEDICINE CLINIC | Age: 73
End: 2023-10-13
Payer: MEDICARE

## 2023-10-13 DIAGNOSIS — J30.9 ALLERGIC RHINITIS, UNSPECIFIED SEASONALITY, UNSPECIFIED TRIGGER: Primary | ICD-10-CM

## 2023-10-13 PROCEDURE — 95117 IMMUNOTHERAPY INJECTIONS: CPT | Performed by: FAMILY MEDICINE

## 2023-10-24 ENCOUNTER — CLINICAL SUPPORT (OUTPATIENT)
Dept: FAMILY MEDICINE CLINIC | Age: 73
End: 2023-10-24
Payer: MEDICARE

## 2023-10-24 DIAGNOSIS — Z23 NEED FOR INFLUENZA VACCINATION: Primary | ICD-10-CM

## 2023-11-01 ENCOUNTER — CLINICAL SUPPORT (OUTPATIENT)
Dept: FAMILY MEDICINE CLINIC | Age: 73
End: 2023-11-01
Payer: MEDICARE

## 2023-11-01 DIAGNOSIS — J30.9 ALLERGIC RHINITIS, UNSPECIFIED SEASONALITY, UNSPECIFIED TRIGGER: Primary | ICD-10-CM

## 2023-11-01 PROCEDURE — 95117 IMMUNOTHERAPY INJECTIONS: CPT | Performed by: FAMILY MEDICINE

## 2023-11-07 ENCOUNTER — CLINICAL SUPPORT (OUTPATIENT)
Dept: FAMILY MEDICINE CLINIC | Age: 73
End: 2023-11-07
Payer: MEDICARE

## 2023-11-07 DIAGNOSIS — J30.9 ALLERGIC RHINITIS, UNSPECIFIED SEASONALITY, UNSPECIFIED TRIGGER: ICD-10-CM

## 2023-11-07 DIAGNOSIS — E34.9 TESTOSTERONE DEFICIENCY: Primary | ICD-10-CM

## 2023-11-07 RX ADMIN — TESTOSTERONE CYPIONATE 200 MG: 200 INJECTION, SOLUTION INTRAMUSCULAR at 14:15

## 2023-11-22 ENCOUNTER — CLINICAL SUPPORT (OUTPATIENT)
Dept: FAMILY MEDICINE CLINIC | Age: 73
End: 2023-11-22
Payer: MEDICARE

## 2023-11-22 DIAGNOSIS — J30.9 ALLERGIC RHINITIS, UNSPECIFIED SEASONALITY, UNSPECIFIED TRIGGER: Primary | ICD-10-CM

## 2023-11-22 PROCEDURE — 95117 IMMUNOTHERAPY INJECTIONS: CPT | Performed by: FAMILY MEDICINE

## 2023-12-15 ENCOUNTER — CLINICAL SUPPORT (OUTPATIENT)
Dept: FAMILY MEDICINE CLINIC | Age: 73
End: 2023-12-15
Payer: MEDICARE

## 2023-12-15 VITALS — SYSTOLIC BLOOD PRESSURE: 119 MMHG | DIASTOLIC BLOOD PRESSURE: 68 MMHG | HEART RATE: 98 BPM

## 2023-12-15 DIAGNOSIS — E34.9 TESTOSTERONE DEFICIENCY: Primary | ICD-10-CM

## 2023-12-15 DIAGNOSIS — J30.9 ALLERGIC RHINITIS, UNSPECIFIED SEASONALITY, UNSPECIFIED TRIGGER: ICD-10-CM

## 2023-12-15 RX ORDER — TESTOSTERONE CYPIONATE 200 MG/ML
200 INJECTION, SOLUTION INTRAMUSCULAR
Status: SHIPPED | OUTPATIENT
Start: 2023-12-15 | End: 2024-04-13

## 2023-12-15 RX ADMIN — TESTOSTERONE CYPIONATE 200 MG: 200 INJECTION, SOLUTION INTRAMUSCULAR at 14:42

## 2024-01-03 ENCOUNTER — OFFICE VISIT (OUTPATIENT)
Dept: FAMILY MEDICINE CLINIC | Age: 74
End: 2024-01-03
Payer: MEDICARE

## 2024-01-03 VITALS
OXYGEN SATURATION: 96 % | BODY MASS INDEX: 29.33 KG/M2 | TEMPERATURE: 98.3 F | HEIGHT: 69 IN | DIASTOLIC BLOOD PRESSURE: 88 MMHG | HEART RATE: 80 BPM | SYSTOLIC BLOOD PRESSURE: 135 MMHG | WEIGHT: 198 LBS

## 2024-01-03 DIAGNOSIS — R19.7 DIARRHEA, UNSPECIFIED TYPE: ICD-10-CM

## 2024-01-03 DIAGNOSIS — E34.9 TESTOSTERONE DEFICIENCY: ICD-10-CM

## 2024-01-03 DIAGNOSIS — J30.9 ALLERGIC RHINITIS, UNSPECIFIED SEASONALITY, UNSPECIFIED TRIGGER: ICD-10-CM

## 2024-01-03 DIAGNOSIS — Z12.5 SCREENING FOR MALIGNANT NEOPLASM OF PROSTATE: ICD-10-CM

## 2024-01-03 DIAGNOSIS — E78.2 MIXED HYPERLIPIDEMIA: Primary | ICD-10-CM

## 2024-01-03 DIAGNOSIS — I10 PRIMARY HYPERTENSION: ICD-10-CM

## 2024-01-03 DIAGNOSIS — G89.29 CHRONIC PAIN OF BOTH KNEES: ICD-10-CM

## 2024-01-03 DIAGNOSIS — M25.561 CHRONIC PAIN OF BOTH KNEES: ICD-10-CM

## 2024-01-03 DIAGNOSIS — Z79.899 HIGH RISK MEDICATION USE: ICD-10-CM

## 2024-01-03 DIAGNOSIS — M25.562 CHRONIC PAIN OF BOTH KNEES: ICD-10-CM

## 2024-01-03 LAB
AMPHET+METHAMPHET UR QL: NEGATIVE
AMPHETAMINES UR QL: NEGATIVE
BARBITURATES UR QL SCN: POSITIVE
BENZODIAZ UR QL SCN: POSITIVE
BUPRENORPHINE SERPL-MCNC: NEGATIVE NG/ML
CANNABINOIDS SERPL QL: NEGATIVE
COCAINE UR QL: NEGATIVE
EXPIRATION DATE: ABNORMAL
Lab: ABNORMAL
MDMA UR QL SCN: NEGATIVE
METHADONE UR QL SCN: NEGATIVE
MORPHINE/OPIATES SCREEN, URINE: NEGATIVE
OXYCODONE UR QL SCN: NEGATIVE
PCP UR QL SCN: NEGATIVE

## 2024-01-03 PROCEDURE — 80305 DRUG TEST PRSMV DIR OPT OBS: CPT | Performed by: NURSE PRACTITIONER

## 2024-01-03 PROCEDURE — 1160F RVW MEDS BY RX/DR IN RCRD: CPT | Performed by: NURSE PRACTITIONER

## 2024-01-03 PROCEDURE — 1159F MED LIST DOCD IN RCRD: CPT | Performed by: NURSE PRACTITIONER

## 2024-01-03 PROCEDURE — 3079F DIAST BP 80-89 MM HG: CPT | Performed by: NURSE PRACTITIONER

## 2024-01-03 PROCEDURE — 3075F SYST BP GE 130 - 139MM HG: CPT | Performed by: NURSE PRACTITIONER

## 2024-01-03 PROCEDURE — 99214 OFFICE O/P EST MOD 30 MIN: CPT | Performed by: NURSE PRACTITIONER

## 2024-01-03 RX ORDER — ATORVASTATIN CALCIUM 40 MG/1
40 TABLET, FILM COATED ORAL DAILY
Qty: 90 TABLET | Refills: 0 | Status: SHIPPED | OUTPATIENT
Start: 2024-01-03

## 2024-01-03 RX ORDER — TESTOSTERONE CYPIONATE 200 MG/ML
INJECTION, SOLUTION INTRAMUSCULAR
Qty: 2 ML | Refills: 0 | Status: SHIPPED | OUTPATIENT
Start: 2024-01-03

## 2024-01-03 RX ORDER — MONTELUKAST SODIUM 4 MG/1
2 TABLET, CHEWABLE ORAL DAILY
Qty: 180 TABLET | Refills: 0 | Status: SHIPPED | OUTPATIENT
Start: 2024-01-03

## 2024-01-03 RX ORDER — LISINOPRIL 20 MG/1
20 TABLET ORAL DAILY
Qty: 90 TABLET | Refills: 0 | Status: SHIPPED | OUTPATIENT
Start: 2024-01-03

## 2024-01-03 NOTE — PROGRESS NOTES
Chief Complaint  Cesario Dueñas presents to [facility] for Hypertension    Subjective          History of Present Illness    Deshawn continues on atorvastatin for hyperlipidemia and lisinopril for HTN. He thinks that he is taking the increased dose of atorvastatin now.   He has seen neurology for benign essential tremors. He is on primidone.   He is also on testosterone injections for testosterone deficiency.   He sees Dr Clive Singh psychiatry for depression/anxiety. He is on pristiq, gabapentin, and xanax.  Deshawn reports that he had colonoscopy 5/2022 with Dr Joseluis Pina. He has had issues with bowel movements since that time. He reports issues with diarrhea after eating. Has been going on for awhile. He was prescribed colestipol which he reports helped for short term only. He takes Immodium on occasion which slightly helps. Denies acid reflux.   He has seen ortho Dr James and had knee injection 3 months ago. Helped for short term.     Review of Systems      No Known Allergies   Past Medical History:   Diagnosis Date    Hypertension      Current Outpatient Medications   Medication Sig Dispense Refill    ALPRAZolam (XANAX) 0.5 MG tablet Take 1 tablet by mouth At Night As Needed.      atorvastatin (LIPITOR) 40 MG tablet Take 1 tablet by mouth Daily. 90 tablet 0    cholecalciferol (VITAMIN D3) 25 MCG (1000 UT) tablet Take 1 tablet by mouth As Needed.      colestipol (COLESTID) 1 g tablet Take 2 tablets by mouth Daily. 180 tablet 0    desvenlafaxine (PRISTIQ) 50 MG 24 hr tablet Take 1 tablet by mouth Daily.      gabapentin (NEURONTIN) 600 MG tablet Take 1 tablet by mouth 2 (two) times a day.      levocetirizine (XYZAL) 5 MG tablet Take  by mouth As Needed.      lisinopril (PRINIVIL,ZESTRIL) 20 MG tablet Take 1 tablet by mouth Daily. 90 tablet 0    montelukast (SINGULAIR) 10 MG tablet Take 1 tablet by mouth Every Night.      multivitamin with minerals tablet tablet Take 1 tablet by  mouth Daily.      primidone (MYSOLINE) 250 MG tablet 1/2 tablet in the morning, 1 tablet at night. 135 tablet 3    sildenafil (VIAGRA) 100 MG tablet Take 1 tablet by mouth As Needed for Erectile Dysfunction. 30 tablet 0    Testosterone Cypionate (DEPOTESTOTERONE CYPIONATE) 200 MG/ML injection INJECT 1 ML INTRAMUSCULARY AS DIRECTED EVERY 30 days 2 mL 0     Current Facility-Administered Medications   Medication Dose Route Frequency Provider Last Rate Last Admin    Allergy Serum Injection  0.1 mL Subcutaneous Once Lenka Milligan APRN        Allergy Serum Injection  0.1 mL Subcutaneous Once Lenka Milligan APRN        Testosterone Cypionate (DEPOTESTOTERONE CYPIONATE) injection 200 mg  200 mg Intramuscular Q30 Days Lenka Milligan APRN   200 mg at 12/15/23 1442     History reviewed. No pertinent surgical history.   Social History     Tobacco Use    Smoking status: Former     Types: Cigarettes     Quit date:      Years since quittin.0     Passive exposure: Past    Smokeless tobacco: Never   Vaping Use    Vaping Use: Never used   Substance Use Topics    Alcohol use: Yes     Comment: maggi daily    Drug use: Never     History reviewed. No pertinent family history.  There are no preventive care reminders to display for this patient.     Immunization History   Administered Date(s) Administered    COVID-19 (MODERNA) 1st,2nd,3rd Dose Monovalent 2021, 2021    COVID-19 (PFIZER) Purple Cap Monovalent 12/10/2021    FLUAD TRI 65YR+ 10/21/2019, 10/09/2020    Fluzone High-Dose 65+yrs 2021, 10/21/2022, 10/24/2023    Fluzone Quad >6mos (Multi-dose) 10/09/2020    Hepatitis A 2018, 2018    Pneumococcal Conjugate 13-Valent (PCV13) 10/21/2015    Pneumococcal Polysaccharide (PPSV23) 2015    Td (TDVAX) 2012        Objective     Vitals:    24 1302 24 1308   BP: 146/84 135/88   BP Location: Left arm Right arm   Patient Position: Sitting Sitting   Cuff Size: Large Adult Large  "Adult   Pulse: 78 80   Temp: 98.3 °F (36.8 °C)    TempSrc: Oral    SpO2: 96%    Weight: 89.8 kg (198 lb)    Height: 175.3 cm (69\")      Body mass index is 29.24 kg/m².             Physical Exam  Vitals reviewed.   Constitutional:       General: He is not in acute distress.     Appearance: Normal appearance. He is well-developed.   HENT:      Head: Normocephalic and atraumatic.   Cardiovascular:      Rate and Rhythm: Normal rate and regular rhythm.   Pulmonary:      Effort: Pulmonary effort is normal.      Breath sounds: Normal breath sounds.   Abdominal:      General: Bowel sounds are normal.      Palpations: Abdomen is soft.   Neurological:      Mental Status: He is alert and oriented to person, place, and time.   Psychiatric:         Mood and Affect: Mood and affect normal.           Result Review :                               Assessment and Plan      Diagnoses and all orders for this visit:    1. Mixed hyperlipidemia (Primary)  Comments:  Will return for fasting labs.  Orders:  -     atorvastatin (LIPITOR) 40 MG tablet; Take 1 tablet by mouth Daily.  Dispense: 90 tablet; Refill: 0  -     Comprehensive metabolic panel; Future  -     Lipid panel; Future    2. Primary hypertension  Comments:  Medical condition is stable.  Continue same therapy.  Will recheck at next regular appointment  Orders:  -     lisinopril (PRINIVIL,ZESTRIL) 20 MG tablet; Take 1 tablet by mouth Daily.  Dispense: 90 tablet; Refill: 0    3. Testosterone deficiency  Comments:  Medical condition is stable.  Continue same therapy.  Will recheck at next regular appointment  Orders:  -     Testosterone Cypionate (DEPOTESTOTERONE CYPIONATE) 200 MG/ML injection; INJECT 1 ML INTRAMUSCULARY AS DIRECTED EVERY 30 days  Dispense: 2 mL; Refill: 0  -     Testosterone; Future    4. Diarrhea, unspecified type  Comments:  Will increase colestipol. Check stool studies. Consider GI referral as needed.  Orders:  -     colestipol (COLESTID) 1 g tablet; Take 2 " tablets by mouth Daily.  Dispense: 180 tablet; Refill: 0  -     CBC No Differential; Future  -     Enteric Bacterial Panel - Stool, Per Rectum; Future  -     Enteric Parasite Panel - Stool, Per Rectum; Future  -     Clostridioides difficile Toxin, PCR - Stool, Per Rectum; Future    5. High risk medication use  -     Cancel: Urine Drug Screen - Urine, Clean Catch; Future  -     POC Medline 12 Panel Urine Drug Screen    6. Screening for malignant neoplasm of prostate  -     PSA SCREENING; Future    7. Allergic rhinitis, unspecified seasonality, unspecified trigger  -     Allergy Serum Injection  -     Allergy Serum Injection    8. Chronic pain of both knees  Comments:  Reviewed ortho note and advised PRN f/u and may need another injection. Pt will call to schedule appt. Will let me know if he needs referral.              Follow Up     Return in about 3 months (around 4/3/2024).

## 2024-02-02 ENCOUNTER — TELEPHONE (OUTPATIENT)
Dept: FAMILY MEDICINE CLINIC | Age: 74
End: 2024-02-02
Payer: MEDICARE

## 2024-02-02 NOTE — TELEPHONE ENCOUNTER
1st attempt- spoke with pt about overdue results ordered on 1-3-23 by pcp info pt to stop by the lab and  supplies

## 2024-02-08 ENCOUNTER — LAB (OUTPATIENT)
Dept: LAB | Facility: HOSPITAL | Age: 74
End: 2024-02-08
Payer: MEDICARE

## 2024-02-08 DIAGNOSIS — Z12.5 SCREENING FOR MALIGNANT NEOPLASM OF PROSTATE: ICD-10-CM

## 2024-02-08 DIAGNOSIS — E78.2 MIXED HYPERLIPIDEMIA: ICD-10-CM

## 2024-02-08 DIAGNOSIS — E34.9 TESTOSTERONE DEFICIENCY: ICD-10-CM

## 2024-02-08 DIAGNOSIS — R19.7 DIARRHEA, UNSPECIFIED TYPE: ICD-10-CM

## 2024-02-08 LAB
ALBUMIN SERPL-MCNC: 4.3 G/DL (ref 3.5–5.2)
ALBUMIN/GLOB SERPL: 1.7 G/DL
ALP SERPL-CCNC: 100 U/L (ref 39–117)
ALT SERPL W P-5'-P-CCNC: 30 U/L (ref 1–41)
ANION GAP SERPL CALCULATED.3IONS-SCNC: 9.4 MMOL/L (ref 5–15)
AST SERPL-CCNC: 44 U/L (ref 1–40)
BILIRUB SERPL-MCNC: 0.2 MG/DL (ref 0–1.2)
BUN SERPL-MCNC: 13 MG/DL (ref 8–23)
BUN/CREAT SERPL: 13 (ref 7–25)
CALCIUM SPEC-SCNC: 9 MG/DL (ref 8.6–10.5)
CHLORIDE SERPL-SCNC: 103 MMOL/L (ref 98–107)
CHOLEST SERPL-MCNC: 175 MG/DL (ref 0–200)
CO2 SERPL-SCNC: 26.6 MMOL/L (ref 22–29)
CREAT SERPL-MCNC: 1 MG/DL (ref 0.76–1.27)
DEPRECATED RDW RBC AUTO: 50.5 FL (ref 37–54)
EGFRCR SERPLBLD CKD-EPI 2021: 80 ML/MIN/1.73
ERYTHROCYTE [DISTWIDTH] IN BLOOD BY AUTOMATED COUNT: 13.1 % (ref 12.3–15.4)
GLOBULIN UR ELPH-MCNC: 2.6 GM/DL
GLUCOSE SERPL-MCNC: 92 MG/DL (ref 65–99)
HCT VFR BLD AUTO: 41.5 % (ref 37.5–51)
HDLC SERPL-MCNC: 105 MG/DL (ref 40–60)
HGB BLD-MCNC: 14 G/DL (ref 13–17.7)
LDLC SERPL CALC-MCNC: 61 MG/DL (ref 0–100)
LDLC/HDLC SERPL: 0.59 {RATIO}
MCH RBC QN AUTO: 35 PG (ref 26.6–33)
MCHC RBC AUTO-ENTMCNC: 33.7 G/DL (ref 31.5–35.7)
MCV RBC AUTO: 103.8 FL (ref 79–97)
PLATELET # BLD AUTO: 158 10*3/MM3 (ref 140–450)
PMV BLD AUTO: 11.3 FL (ref 6–12)
POTASSIUM SERPL-SCNC: 4.7 MMOL/L (ref 3.5–5.2)
PROT SERPL-MCNC: 6.9 G/DL (ref 6–8.5)
PSA SERPL-MCNC: 0.37 NG/ML (ref 0–4)
RBC # BLD AUTO: 4 10*6/MM3 (ref 4.14–5.8)
SODIUM SERPL-SCNC: 139 MMOL/L (ref 136–145)
TESTOST SERPL-MCNC: 201 NG/DL (ref 193–740)
TRIGL SERPL-MCNC: 38 MG/DL (ref 0–150)
VLDLC SERPL-MCNC: 9 MG/DL (ref 5–40)
WBC NRBC COR # BLD AUTO: 4.8 10*3/MM3 (ref 3.4–10.8)

## 2024-02-08 PROCEDURE — 36415 COLL VENOUS BLD VENIPUNCTURE: CPT

## 2024-02-08 PROCEDURE — 80061 LIPID PANEL: CPT

## 2024-02-08 PROCEDURE — 80053 COMPREHEN METABOLIC PANEL: CPT

## 2024-02-08 PROCEDURE — 85027 COMPLETE CBC AUTOMATED: CPT

## 2024-02-08 PROCEDURE — 87506 IADNA-DNA/RNA PROBE TQ 6-11: CPT

## 2024-02-08 PROCEDURE — 84403 ASSAY OF TOTAL TESTOSTERONE: CPT

## 2024-02-08 PROCEDURE — G0103 PSA SCREENING: HCPCS

## 2024-02-09 DIAGNOSIS — R19.7 DIARRHEA, UNSPECIFIED TYPE: Primary | ICD-10-CM

## 2024-02-09 LAB
C COLI+JEJ+UPSA DNA STL QL NAA+NON-PROBE: NOT DETECTED
CRYPTOSP DNA STL QL NAA+NON-PROBE: NOT DETECTED
E HISTOLYT DNA STL QL NAA+NON-PROBE: NOT DETECTED
EC STX1+STX2 GENES STL QL NAA+NON-PROBE: NOT DETECTED
G LAMBLIA DNA STL QL NAA+NON-PROBE: NOT DETECTED
S ENT+BONG DNA STL QL NAA+NON-PROBE: NOT DETECTED
SHIGELLA SP+EIEC IPAH ST NAA+NON-PROBE: NOT DETECTED

## 2024-02-21 ENCOUNTER — LAB (OUTPATIENT)
Dept: LAB | Facility: HOSPITAL | Age: 74
End: 2024-02-21
Payer: MEDICARE

## 2024-02-21 DIAGNOSIS — R19.7 DIARRHEA, UNSPECIFIED TYPE: ICD-10-CM

## 2024-03-20 ENCOUNTER — CLINICAL SUPPORT (OUTPATIENT)
Dept: FAMILY MEDICINE CLINIC | Age: 74
End: 2024-03-20
Payer: MEDICARE

## 2024-03-20 VITALS — HEART RATE: 97 BPM | SYSTOLIC BLOOD PRESSURE: 129 MMHG | DIASTOLIC BLOOD PRESSURE: 81 MMHG

## 2024-03-20 DIAGNOSIS — E34.9 TESTOSTERONE DEFICIENCY: Primary | ICD-10-CM

## 2024-03-20 DIAGNOSIS — J30.9 ALLERGIC RHINITIS, UNSPECIFIED SEASONALITY, UNSPECIFIED TRIGGER: ICD-10-CM

## 2024-03-20 RX ADMIN — TESTOSTERONE CYPIONATE 200 MG: 200 INJECTION, SOLUTION INTRAMUSCULAR at 14:29

## 2024-03-22 ENCOUNTER — CLINICAL SUPPORT (OUTPATIENT)
Dept: FAMILY MEDICINE CLINIC | Age: 74
End: 2024-03-22
Payer: MEDICARE

## 2024-03-22 DIAGNOSIS — J30.9 ALLERGIC RHINITIS, UNSPECIFIED SEASONALITY, UNSPECIFIED TRIGGER: Primary | ICD-10-CM

## 2024-03-22 PROCEDURE — 95117 IMMUNOTHERAPY INJECTIONS: CPT | Performed by: FAMILY MEDICINE

## 2024-04-01 ENCOUNTER — CLINICAL SUPPORT (OUTPATIENT)
Dept: FAMILY MEDICINE CLINIC | Age: 74
End: 2024-04-01
Payer: MEDICARE

## 2024-04-01 DIAGNOSIS — J30.9 ALLERGIC RHINITIS, UNSPECIFIED SEASONALITY, UNSPECIFIED TRIGGER: Primary | ICD-10-CM

## 2024-04-01 PROCEDURE — 95117 IMMUNOTHERAPY INJECTIONS: CPT | Performed by: FAMILY MEDICINE

## 2024-04-22 ENCOUNTER — TELEPHONE (OUTPATIENT)
Dept: FAMILY MEDICINE CLINIC | Age: 74
End: 2024-04-22

## 2024-04-22 ENCOUNTER — OFFICE VISIT (OUTPATIENT)
Dept: FAMILY MEDICINE CLINIC | Age: 74
End: 2024-04-22
Payer: MEDICARE

## 2024-04-22 ENCOUNTER — HOSPITAL ENCOUNTER (OUTPATIENT)
Dept: GENERAL RADIOLOGY | Facility: HOSPITAL | Age: 74
Discharge: HOME | End: 2024-04-22
Admitting: NURSE PRACTITIONER
Payer: MEDICARE

## 2024-04-22 VITALS
WEIGHT: 191.6 LBS | OXYGEN SATURATION: 98 % | BODY MASS INDEX: 28.38 KG/M2 | SYSTOLIC BLOOD PRESSURE: 128 MMHG | HEART RATE: 71 BPM | TEMPERATURE: 98.6 F | DIASTOLIC BLOOD PRESSURE: 84 MMHG | HEIGHT: 69 IN

## 2024-04-22 DIAGNOSIS — M54.2 NECK PAIN: Primary | ICD-10-CM

## 2024-04-22 DIAGNOSIS — R19.7 DIARRHEA, UNSPECIFIED TYPE: ICD-10-CM

## 2024-04-22 DIAGNOSIS — M54.2 NECK PAIN: ICD-10-CM

## 2024-04-22 DIAGNOSIS — M54.50 LOW BACK PAIN WITHOUT SCIATICA, UNSPECIFIED BACK PAIN LATERALITY, UNSPECIFIED CHRONICITY: ICD-10-CM

## 2024-04-22 PROCEDURE — 96372 THER/PROPH/DIAG INJ SC/IM: CPT | Performed by: NURSE PRACTITIONER

## 2024-04-22 PROCEDURE — 3079F DIAST BP 80-89 MM HG: CPT | Performed by: NURSE PRACTITIONER

## 2024-04-22 PROCEDURE — 99214 OFFICE O/P EST MOD 30 MIN: CPT | Performed by: NURSE PRACTITIONER

## 2024-04-22 PROCEDURE — 1160F RVW MEDS BY RX/DR IN RCRD: CPT | Performed by: NURSE PRACTITIONER

## 2024-04-22 PROCEDURE — 1159F MED LIST DOCD IN RCRD: CPT | Performed by: NURSE PRACTITIONER

## 2024-04-22 PROCEDURE — 3074F SYST BP LT 130 MM HG: CPT | Performed by: NURSE PRACTITIONER

## 2024-04-22 PROCEDURE — 72040 X-RAY EXAM NECK SPINE 2-3 VW: CPT

## 2024-04-22 PROCEDURE — 72100 X-RAY EXAM L-S SPINE 2/3 VWS: CPT

## 2024-04-22 RX ORDER — PREDNISONE 10 MG/1
TABLET ORAL
Qty: 35 TABLET | Refills: 0 | Status: SHIPPED | OUTPATIENT
Start: 2024-04-22 | End: 2024-05-06

## 2024-04-22 RX ORDER — MONTELUKAST SODIUM 4 MG/1
3 TABLET, CHEWABLE ORAL DAILY
Qty: 270 TABLET | Refills: 0 | Status: SHIPPED | OUTPATIENT
Start: 2024-04-22

## 2024-04-22 RX ORDER — KETOROLAC TROMETHAMINE 30 MG/ML
30 INJECTION, SOLUTION INTRAMUSCULAR; INTRAVENOUS EVERY 6 HOURS PRN
Status: SHIPPED | OUTPATIENT
Start: 2024-04-22 | End: 2024-04-27

## 2024-04-22 RX ADMIN — KETOROLAC TROMETHAMINE 30 MG: 30 INJECTION, SOLUTION INTRAMUSCULAR; INTRAVENOUS at 10:45

## 2024-04-22 NOTE — PROGRESS NOTES
Chief Complaint  Cesario Dueñas presents to [facility] for Hospital Follow Up Visit (4.7.2024 in [place] for a fall due to back giving out)    Subjective          History of Present Illness    Deshawn is here today to follow up after ER visit on 4/7/24 while in [place]. He reports that he fell because his low back gave out on him. Hit head. No LOC or memory loss. He notes his neck has been bothering him. He reports that his low back and neck were bothering him some prior to this happening. Reports CT head in ER was normal. He received 8 stitches to the left side of his forehead. No prescriptions were given. No headache. He has been taking Tylenol and OTC arthritis medication. He has seen a specialist for his neck in the past. He has done PT for this back in the past. Stitches have been removed. Records are not available at time of visit.   Deshawn reports that he had colonoscopy 5/2022 with Dr Joseluis Pina. He has had issues with bowel movements since that time. He reports issues with diarrhea after eating. Has been going on for awhile. He was prescribed colestipol which he reports helped for short term only. Does not seem to be helping as much as previous. He takes Immodium on occasion which slightly helps. Denies acid reflux. Parasite and bacteria testing have been negative. Cdiff unable to be performed as stool was to formed.     Review of Systems      No Known Allergies   Past Medical History:   Diagnosis Date    Hypertension      Current Outpatient Medications   Medication Sig Dispense Refill    ALPRAZolam (XANAX) 0.5 MG tablet Take 1 tablet by mouth At Night As Needed.      atorvastatin (LIPITOR) 40 MG tablet Take 1 tablet by mouth Daily. 90 tablet 0    cholecalciferol (VITAMIN D3) 25 MCG (1000 UT) tablet Take 1 tablet by mouth As Needed.      colestipol (COLESTID) 1 g tablet Take 3 tablets by mouth Daily. 270 tablet 0    desvenlafaxine (PRISTIQ) 50 MG 24 hr tablet Take 1 tablet  by mouth Daily.      gabapentin (NEURONTIN) 600 MG tablet Take 1 tablet by mouth 2 (two) times a day.      levocetirizine (XYZAL) 5 MG tablet Take  by mouth As Needed.      lisinopril (PRINIVIL,ZESTRIL) 20 MG tablet Take 1 tablet by mouth Daily. 90 tablet 0    montelukast (SINGULAIR) 10 MG tablet Take 1 tablet by mouth Every Night.      multivitamin with minerals tablet tablet Take 1 tablet by mouth Daily.      primidone (MYSOLINE) 250 MG tablet 1/2 tablet in the morning, 1 tablet at night. 135 tablet 3    sildenafil (VIAGRA) 100 MG tablet Take 1 tablet by mouth As Needed for Erectile Dysfunction. 30 tablet 0    Testosterone Cypionate (DEPOTESTOTERONE CYPIONATE) 200 MG/ML injection INJECT 1 ML INTRAMUSCULARY AS DIRECTED EVERY 30 days 2 mL 0    predniSONE (DELTASONE) 10 MG tablet Take 2 tablets by mouth 2 (Two) Times a Day for 5 days, THEN 1 tablet 2 (Two) Times a Day for 5 days, THEN 1 tablet Daily for 5 days. 35 tablet 0     Current Facility-Administered Medications   Medication Dose Route Frequency Provider Last Rate Last Admin    Allergy Serum Injection  0.1 mL Subcutaneous Once Lenka Milligan APRN        Allergy Serum Injection  0.1 mL Subcutaneous Once Lenka Milligan APRN        ketorolac (TORADOL) injection 30 mg  30 mg Intramuscular Q6H PRN Lenka Milligan APRN   30 mg at 24 1045     History reviewed. No pertinent surgical history.   Social History     Tobacco Use    Smoking status: Former     Current packs/day: 0.00     Types: Cigarettes     Quit date:      Years since quittin.3     Passive exposure: Past    Smokeless tobacco: Never   Vaping Use    Vaping status: Never Used   Substance Use Topics    Alcohol use: Yes     Comment: whiskey daily    Drug use: Never     History reviewed. No pertinent family history.  Health Maintenance Due   Topic Date Due    ANNUAL WELLNESS VISIT  2024      Immunization History   Administered Date(s) Administered    COVID-19 (MODERNA) 1st,2nd,3rd Dose  "Monovalent 05/12/2021, 06/09/2021    COVID-19 (PFIZER) Purple Cap Monovalent 12/10/2021    FLUAD TRI 65YR+ 10/21/2019, 10/09/2020    Fluzone High-Dose 65+yrs 11/12/2021, 10/21/2022, 10/24/2023    Fluzone Quad >6mos (Multi-dose) 10/09/2020    Hepatitis A 05/17/2018, 11/23/2018    Pneumococcal Conjugate 13-Valent (PCV13) 10/21/2015    Pneumococcal Polysaccharide (PPSV23) 12/31/2015    Td (TDVAX) 08/03/2012        Objective     Vitals:    04/22/24 1006 04/22/24 1011   BP: 142/88 128/84   BP Location: Left arm Right arm   Patient Position: Sitting Sitting   Cuff Size: Large Adult Large Adult   Pulse: 81 71   Temp: 98.6 °F (37 °C)    TempSrc: Oral    SpO2: 98%    Weight: 86.9 kg (191 lb 9.6 oz)    Height: 175.3 cm (69\")      Body mass index is 28.29 kg/m².                No results found.    Physical Exam  Vitals reviewed.   Constitutional:       General: He is not in acute distress.     Appearance: Normal appearance. He is well-developed.   HENT:      Head: Normocephalic and atraumatic.   Cardiovascular:      Rate and Rhythm: Normal rate and regular rhythm.   Pulmonary:      Effort: Pulmonary effort is normal.      Breath sounds: Normal breath sounds.   Musculoskeletal:      Cervical back: Tenderness present. Decreased range of motion.      Lumbar back: No tenderness. Normal range of motion.   Skin:     Comments: Healing laceration to left forehead above eye. Bruising below left eye. Healing.    Neurological:      Mental Status: He is alert and oriented to person, place, and time.   Psychiatric:         Mood and Affect: Mood and affect normal.           Result Review :                               Assessment and Plan      Assessment & Plan  Neck pain  Updating Cspine and Lspine xrays today. Rx course of steroids for inflammation.   Low back pain without sciatica, unspecified back pain laterality, unspecified chronicity  Discussed OTC medications including voltaren gel, salonpas patch.   Diarrhea, unspecified type  Will " increase colestipol to 3 grams daily. Declines GI referral at this time.       Orders Placed This Encounter   Procedures    XR Spine Cervical 2 or 3 View    XR Spine Lumbar 2 or 3 View     New Medications Ordered This Visit   Medications    predniSONE (DELTASONE) 10 MG tablet     Sig: Take 2 tablets by mouth 2 (Two) Times a Day for 5 days, THEN 1 tablet 2 (Two) Times a Day for 5 days, THEN 1 tablet Daily for 5 days.     Dispense:  35 tablet     Refill:  0    ketorolac (TORADOL) injection 30 mg    colestipol (COLESTID) 1 g tablet     Sig: Take 3 tablets by mouth Daily.     Dispense:  270 tablet     Refill:  0                 Follow Up     Return for Medicare Wellness.

## 2024-04-22 NOTE — TELEPHONE ENCOUNTER
Please request copy of ER visit records including imaging from [facility] in [place] [employer]

## 2024-04-23 ENCOUNTER — CLINICAL SUPPORT (OUTPATIENT)
Dept: FAMILY MEDICINE CLINIC | Age: 74
End: 2024-04-23
Payer: MEDICARE

## 2024-04-23 DIAGNOSIS — J30.9 ALLERGIC RHINITIS, UNSPECIFIED SEASONALITY, UNSPECIFIED TRIGGER: Primary | ICD-10-CM

## 2024-04-23 PROCEDURE — 95117 IMMUNOTHERAPY INJECTIONS: CPT | Performed by: FAMILY MEDICINE

## 2024-05-06 ENCOUNTER — TELEPHONE (OUTPATIENT)
Dept: FAMILY MEDICINE CLINIC | Age: 74
End: 2024-05-06
Payer: MEDICARE

## 2024-05-06 DIAGNOSIS — M54.2 NECK PAIN: Primary | ICD-10-CM

## 2024-05-06 RX ORDER — DICLOFENAC SODIUM 75 MG/1
75 TABLET, DELAYED RELEASE ORAL 2 TIMES DAILY PRN
Qty: 180 TABLET | Refills: 0 | Status: SHIPPED | OUTPATIENT
Start: 2024-05-06

## 2024-06-03 ENCOUNTER — TELEPHONE (OUTPATIENT)
Dept: FAMILY MEDICINE CLINIC | Age: 74
End: 2024-06-03
Payer: MEDICARE

## 2024-06-03 NOTE — TELEPHONE ENCOUNTER
"  Caller: Cesario Dueñas \"Bill\"    Relationship: Self    Best call back number: [phone]     What was the call regarding: PATIENT STATES HE WAS RETURNING A CALL TO [facility] BUT I WAS UNABLE TO WARM TRANSFER.   "

## 2024-06-11 ENCOUNTER — OFFICE VISIT (OUTPATIENT)
Dept: NEUROLOGY | Facility: CLINIC | Age: 74
End: 2024-06-11
Payer: MEDICARE

## 2024-06-11 ENCOUNTER — CLINICAL SUPPORT (OUTPATIENT)
Dept: FAMILY MEDICINE CLINIC | Age: 74
End: 2024-06-11
Payer: MEDICARE

## 2024-06-11 VITALS
DIASTOLIC BLOOD PRESSURE: 79 MMHG | SYSTOLIC BLOOD PRESSURE: 126 MMHG | HEIGHT: 69 IN | WEIGHT: 196.5 LBS | BODY MASS INDEX: 29.1 KG/M2 | HEART RATE: 80 BPM

## 2024-06-11 DIAGNOSIS — J30.9 ALLERGIC RHINITIS, UNSPECIFIED SEASONALITY, UNSPECIFIED TRIGGER: Primary | ICD-10-CM

## 2024-06-11 DIAGNOSIS — G25.0 BENIGN ESSENTIAL TREMOR: Primary | ICD-10-CM

## 2024-06-11 PROCEDURE — 1159F MED LIST DOCD IN RCRD: CPT | Performed by: NURSE PRACTITIONER

## 2024-06-11 PROCEDURE — 1160F RVW MEDS BY RX/DR IN RCRD: CPT | Performed by: NURSE PRACTITIONER

## 2024-06-11 PROCEDURE — 3074F SYST BP LT 130 MM HG: CPT | Performed by: NURSE PRACTITIONER

## 2024-06-11 PROCEDURE — 95117 IMMUNOTHERAPY INJECTIONS: CPT | Performed by: FAMILY MEDICINE

## 2024-06-11 PROCEDURE — 99214 OFFICE O/P EST MOD 30 MIN: CPT | Performed by: NURSE PRACTITIONER

## 2024-06-11 PROCEDURE — 3078F DIAST BP <80 MM HG: CPT | Performed by: NURSE PRACTITIONER

## 2024-06-11 PROCEDURE — G2211 COMPLEX E/M VISIT ADD ON: HCPCS | Performed by: NURSE PRACTITIONER

## 2024-06-11 RX ORDER — PRIMIDONE 250 MG/1
TABLET ORAL
Qty: 135 TABLET | Refills: 3 | Status: SHIPPED | OUTPATIENT
Start: 2024-06-11

## 2024-06-11 NOTE — PROGRESS NOTES
"Chief Complaint  Tremors    Subjective          Cesario Dueñas presents to [facility]  History of Present Illness  States he's seen good improvement in tremor with  primidone.  Taking 250mg qHS and 125mg qAM.  Denies side effects.  Is tolerating medicine well.      Interval History:   States he's had tremor to bilateral hands for several years.  Worsens with intention.  Noticed it yesterday while holding a coffee pot.  Worse with nervous or being cold.  No falls.  Denies freezing gait.  Sometimes feel he's having some jaw tremor. Has previously been on primidone and propranolol before.        Objective   Vital Signs:   /79   Pulse 80   Ht 175.3 cm (69\")   Wt 89.1 kg (196 lb 8 oz)   BMI 29.02 kg/m²     Physical Exam  Neurological:      Mental Status: He is oriented to person, place, and time.      Cranial Nerves: Cranial nerves 2-12 are intact.      Motor: Motor strength is normal.     Gait: Gait is intact.        Neurologic Exam     Mental Status   Oriented to person, place, and time.     Cranial Nerves   Cranial nerves II through XII intact.     Motor Exam   Muscle bulk: normal    Strength   Strength 5/5 throughout.     Gait, Coordination, and Reflexes     Gait  Gait: normal    Tremor   Intention tremor: present (Bilateral hands.)No bradykinesia or rigidity noted.        Result Review :   CBC:  Lab Results   Component Value Date    WBC 4.80 02/08/2024    RBC 4.00 (L) 02/08/2024    HGB 14.0 02/08/2024    HCT 41.5 02/08/2024    .8 (H) 02/08/2024    MCH 35.0 (H) 02/08/2024    MCHC 33.7 02/08/2024    RDW 13.1 02/08/2024     02/08/2024     CMP:  Lab Results   Component Value Date    BUN 13 02/08/2024    CREATININE 1.00 02/08/2024    EGFRIFNONA 71 12/16/2021    EGFRIFAFRI 88 09/30/2021     02/08/2024    K 4.7 02/08/2024     02/08/2024    CALCIUM 9.0 02/08/2024    ALBUMIN 4.3 02/08/2024    BILITOT 0.2 02/08/2024    ALKPHOS 100 02/08/2024    " AST 44 (H) 02/08/2024    ALT 30 02/08/2024     TSH/FREE T4:   Lab Results   Component Value Date    TSH 3.850 03/16/2023                Assessment and Plan    Diagnoses and all orders for this visit:    1. Benign essential tremor (Primary)  Assessment & Plan:  Continue primidone 1/2 tablet in the morning and 1 tablet at night      Other orders  -     primidone (MYSOLINE) 250 MG tablet; 1/2 tablet in the morning, 1 tablet at night.  Dispense: 135 tablet; Refill: 3        Follow Up   Return in about 1 year (around 6/11/2025) for tremor f/u.  Patient was given instructions and counseling regarding his condition or for health maintenance advice. Please see specific information pulled into the AVS if appropriate.

## 2024-06-13 DIAGNOSIS — E78.2 MIXED HYPERLIPIDEMIA: ICD-10-CM

## 2024-06-13 DIAGNOSIS — E34.9 TESTOSTERONE DEFICIENCY: ICD-10-CM

## 2024-06-13 RX ORDER — TESTOSTERONE CYPIONATE 200 MG/ML
INJECTION, SOLUTION INTRAMUSCULAR
Qty: 1 ML | Refills: 0 | Status: SHIPPED | OUTPATIENT
Start: 2024-06-13

## 2024-06-13 RX ORDER — ATORVASTATIN CALCIUM 40 MG/1
40 TABLET, FILM COATED ORAL DAILY
Qty: 90 TABLET | Refills: 0 | Status: SHIPPED | OUTPATIENT
Start: 2024-06-13

## 2024-06-21 ENCOUNTER — LAB (OUTPATIENT)
Dept: LAB | Facility: HOSPITAL | Age: 74
End: 2024-06-21
Payer: MEDICARE

## 2024-06-21 ENCOUNTER — OFFICE VISIT (OUTPATIENT)
Dept: FAMILY MEDICINE CLINIC | Age: 74
End: 2024-06-21
Payer: MEDICARE

## 2024-06-21 VITALS
RESPIRATION RATE: 18 BRPM | BODY MASS INDEX: 29.06 KG/M2 | SYSTOLIC BLOOD PRESSURE: 151 MMHG | TEMPERATURE: 97.3 F | OXYGEN SATURATION: 95 % | WEIGHT: 196.2 LBS | HEART RATE: 72 BPM | DIASTOLIC BLOOD PRESSURE: 86 MMHG | HEIGHT: 69 IN

## 2024-06-21 DIAGNOSIS — R60.0 LOCALIZED EDEMA: Primary | ICD-10-CM

## 2024-06-21 DIAGNOSIS — J30.9 ALLERGIC RHINITIS, UNSPECIFIED SEASONALITY, UNSPECIFIED TRIGGER: ICD-10-CM

## 2024-06-21 DIAGNOSIS — R06.02 SHORT OF BREATH ON EXERTION: ICD-10-CM

## 2024-06-21 DIAGNOSIS — I82.412 ACUTE DEEP VEIN THROMBOSIS (DVT) OF FEMORAL VEIN OF LEFT LOWER EXTREMITY: ICD-10-CM

## 2024-06-21 DIAGNOSIS — R60.9 EDEMA, UNSPECIFIED TYPE: ICD-10-CM

## 2024-06-21 DIAGNOSIS — R79.89 POSITIVE D DIMER: ICD-10-CM

## 2024-06-21 LAB
ALBUMIN SERPL-MCNC: 4 G/DL (ref 3.5–5.2)
ALBUMIN/GLOB SERPL: 1.5 G/DL
ALP SERPL-CCNC: 122 U/L (ref 39–117)
ALT SERPL W P-5'-P-CCNC: 17 U/L (ref 1–41)
ANION GAP SERPL CALCULATED.3IONS-SCNC: 10.2 MMOL/L (ref 5–15)
AST SERPL-CCNC: 28 U/L (ref 1–40)
BILIRUB SERPL-MCNC: 0.3 MG/DL (ref 0–1.2)
BUN SERPL-MCNC: 13 MG/DL (ref 8–23)
BUN/CREAT SERPL: 12.1 (ref 7–25)
CALCIUM SPEC-SCNC: 8.3 MG/DL (ref 8.6–10.5)
CHLORIDE SERPL-SCNC: 101 MMOL/L (ref 98–107)
CO2 SERPL-SCNC: 25.8 MMOL/L (ref 22–29)
CREAT SERPL-MCNC: 1.07 MG/DL (ref 0.76–1.27)
D DIMER PPP FEU-MCNC: 2.43 MCGFEU/ML (ref 0–0.72)
EGFRCR SERPLBLD CKD-EPI 2021: 73.7 ML/MIN/1.73
GLOBULIN UR ELPH-MCNC: 2.6 GM/DL
GLUCOSE SERPL-MCNC: 85 MG/DL (ref 65–99)
NT-PROBNP SERPL-MCNC: 199 PG/ML (ref 0–900)
POTASSIUM SERPL-SCNC: 4.4 MMOL/L (ref 3.5–5.2)
PROT SERPL-MCNC: 6.6 G/DL (ref 6–8.5)
SODIUM SERPL-SCNC: 137 MMOL/L (ref 136–145)

## 2024-06-21 PROCEDURE — 1159F MED LIST DOCD IN RCRD: CPT | Performed by: PHYSICIAN ASSISTANT

## 2024-06-21 PROCEDURE — 1160F RVW MEDS BY RX/DR IN RCRD: CPT | Performed by: PHYSICIAN ASSISTANT

## 2024-06-21 PROCEDURE — 83880 ASSAY OF NATRIURETIC PEPTIDE: CPT

## 2024-06-21 PROCEDURE — 85379 FIBRIN DEGRADATION QUANT: CPT

## 2024-06-21 PROCEDURE — 3079F DIAST BP 80-89 MM HG: CPT | Performed by: PHYSICIAN ASSISTANT

## 2024-06-21 PROCEDURE — 80053 COMPREHEN METABOLIC PANEL: CPT

## 2024-06-21 PROCEDURE — 36415 COLL VENOUS BLD VENIPUNCTURE: CPT

## 2024-06-21 PROCEDURE — 3077F SYST BP >= 140 MM HG: CPT | Performed by: PHYSICIAN ASSISTANT

## 2024-06-21 PROCEDURE — 95117 IMMUNOTHERAPY INJECTIONS: CPT | Performed by: PHYSICIAN ASSISTANT

## 2024-06-21 NOTE — PROGRESS NOTES
"Subjective     CHIEF COMPLAINT    Chief Complaint   Patient presents with    Foot Swelling     Bilateral, started 2-3 weeks ago, no loss of feeling, no pain            History of Present Illness  This is a 73-year-old male presenting to clinic with concern for bilateral foot swelling.  This has been present for 2 or 3 weeks and is not really bothersome to him.  He states it is not painful but when he went for another appointment earlier this week his doctor mentioned it to him so he thought he should get it checked out.  He has not noticed any change with elevation but states he has not been paid much attention to it.  He recently started taking diclofenac for his back but states he only takes that 3-4 times a week.  He does report some shortness of breath with activity, which he describes as \"I get tired more easily.\"  He states this has been going on for about 3 months.  He does not have any chest pain or shortness of breath at rest.  Denies history of edema previously.            Review of Systems   Constitutional:  Negative for chills and fever.   Respiratory:  Positive for shortness of breath.    Cardiovascular:  Positive for leg swelling. Negative for chest pain and palpitations.   Musculoskeletal:  Negative for myalgias.            Past Medical History:   Diagnosis Date    Hypertension             History reviewed. No pertinent surgical history.         History reviewed. No pertinent family history.         Social History     Socioeconomic History    Marital status:    Tobacco Use    Smoking status: Former     Current packs/day: 0.00     Types: Cigarettes     Quit date:      Years since quittin.4     Passive exposure: Past    Smokeless tobacco: Never   Vaping Use    Vaping status: Never Used   Substance and Sexual Activity    Alcohol use: Yes     Comment: maggi daily    Drug use: Never    Sexual activity: Defer            No Known Allergies         Current Outpatient Medications on File " "Prior to Visit   Medication Sig Dispense Refill    ALPRAZolam (XANAX) 0.5 MG tablet Take 1 tablet by mouth At Night As Needed.      atorvastatin (LIPITOR) 40 MG tablet Take 1 tablet by mouth Daily. 90 tablet 0    colestipol (COLESTID) 1 g tablet Take 3 tablets by mouth Daily. 270 tablet 0    desvenlafaxine (PRISTIQ) 50 MG 24 hr tablet Take 1 tablet by mouth Daily.      diclofenac (VOLTAREN) 75 MG EC tablet Take 1 tablet by mouth 2 (Two) Times a Day As Needed (pain). 180 tablet 0    gabapentin (NEURONTIN) 600 MG tablet Take 1 tablet by mouth 2 (two) times a day.      levocetirizine (XYZAL) 5 MG tablet Take  by mouth As Needed.      lisinopril (PRINIVIL,ZESTRIL) 20 MG tablet Take 1 tablet by mouth Daily. 90 tablet 0    montelukast (SINGULAIR) 10 MG tablet Take 1 tablet by mouth Every Night.      multivitamin with minerals tablet tablet Take 1 tablet by mouth Daily.      primidone (MYSOLINE) 250 MG tablet 1/2 tablet in the morning, 1 tablet at night. 135 tablet 3    sildenafil (VIAGRA) 100 MG tablet Take 1 tablet by mouth As Needed for Erectile Dysfunction. 30 tablet 0    Testosterone Cypionate (DEPOTESTOTERONE CYPIONATE) 200 MG/ML injection INJECT 1 ML INTRAMUSCULARLY ONCE EVERY 30 DAYS 1 mL 0    cholecalciferol (VITAMIN D3) 25 MCG (1000 UT) tablet Take 1 tablet by mouth As Needed. (Patient not taking: Reported on 6/21/2024)       Current Facility-Administered Medications on File Prior to Visit   Medication Dose Route Frequency Provider Last Rate Last Admin    Allergy Serum Injection  0.1 mL Subcutaneous Once Lenka Milligan APRN        Allergy Serum Injection  0.1 mL Subcutaneous Once Lenka Milligan APRN                /86 (BP Location: Right arm, Patient Position: Sitting, Cuff Size: Adult)   Pulse 72   Temp 97.3 °F (36.3 °C) (Oral)   Resp 18   Ht 175.3 cm (69\")   Wt 89 kg (196 lb 3.2 oz)   SpO2 95%   BMI 28.97 kg/m²          Objective     Physical Exam  Vitals and nursing note reviewed. "   Constitutional:       General: He is not in acute distress.     Appearance: Normal appearance.   HENT:      Head: Normocephalic and atraumatic.   Cardiovascular:      Rate and Rhythm: Normal rate and regular rhythm.      Heart sounds: Normal heart sounds.   Pulmonary:      Effort: Pulmonary effort is normal. No respiratory distress.      Breath sounds: Normal breath sounds. No rales.   Musculoskeletal:      Right lower leg: Edema (1+ pitting edema up to ankle) present.      Left lower leg: Edema (2+ pitting edema up to mid shin) present.   Skin:     General: Skin is warm and dry.   Neurological:      Mental Status: He is alert and oriented to person, place, and time.   Psychiatric:         Mood and Affect: Mood normal.         Behavior: Behavior normal.                  Lab Results (last 24 hours)       Procedure Component Value Units Date/Time    D-dimer, Quantitative [[id number]] Collected: 06/21/24 1131    Specimen: Blood Updated: 06/21/24 1131    BNP [[id number]] Collected: 06/21/24 1131    Specimen: Blood Updated: 06/21/24 1131    Comprehensive metabolic panel [[id number]] Collected: 06/21/24 1131    Specimen: Blood Updated: 06/21/24 1131            Assessment & Plan  Edema, unspecified type  Exam is most consistent with dependent edema or vascular congestion.  Will check labs as above to evaluate further.  This may also be related to his recent diclofenac use.  I recommend he discontinue that for time so we can see if it improves.  Advised he can take Tylenol for his back pain if needed.  Short of breath on exertion      Orders Placed This Encounter   Procedures    BNP    D-dimer, Quantitative    Comprehensive metabolic panel                   FOR FULL DISCHARGE INSTRUCTIONS/COMMENTS/HANDOUTS please see the   AVS

## 2024-06-24 ENCOUNTER — CLINICAL SUPPORT (OUTPATIENT)
Dept: FAMILY MEDICINE CLINIC | Age: 74
End: 2024-06-24
Payer: MEDICARE

## 2024-06-24 ENCOUNTER — HOSPITAL ENCOUNTER (OUTPATIENT)
Dept: ULTRASOUND IMAGING | Facility: HOSPITAL | Age: 74
Discharge: HOME | End: 2024-06-24
Admitting: PHYSICIAN ASSISTANT
Payer: MEDICARE

## 2024-06-24 ENCOUNTER — TELEPHONE (OUTPATIENT)
Dept: FAMILY MEDICINE CLINIC | Age: 74
End: 2024-06-24
Payer: MEDICARE

## 2024-06-24 DIAGNOSIS — J30.9 ALLERGIC RHINITIS, UNSPECIFIED SEASONALITY, UNSPECIFIED TRIGGER: Primary | ICD-10-CM

## 2024-06-24 DIAGNOSIS — R60.0 LOCALIZED EDEMA: ICD-10-CM

## 2024-06-24 DIAGNOSIS — I82.412 ACUTE DEEP VEIN THROMBOSIS (DVT) OF FEMORAL VEIN OF LEFT LOWER EXTREMITY: Primary | ICD-10-CM

## 2024-06-24 DIAGNOSIS — E34.9 TESTOSTERONE DEFICIENCY: ICD-10-CM

## 2024-06-24 PROCEDURE — 93970 EXTREMITY STUDY: CPT

## 2024-06-24 PROCEDURE — 96372 THER/PROPH/DIAG INJ SC/IM: CPT | Performed by: FAMILY MEDICINE

## 2024-06-24 RX ORDER — WARFARIN SODIUM 5 MG/1
5 TABLET ORAL NIGHTLY
Qty: 14 TABLET | Refills: 0 | Status: SHIPPED | OUTPATIENT
Start: 2024-06-24 | End: 2024-06-27 | Stop reason: ALTCHOICE

## 2024-06-24 RX ORDER — TESTOSTERONE CYPIONATE 200 MG/ML
200 INJECTION, SOLUTION INTRAMUSCULAR
Status: SHIPPED | OUTPATIENT
Start: 2024-06-24 | End: 2024-09-22

## 2024-06-24 RX ORDER — ENOXAPARIN SODIUM 100 MG/ML
80 INJECTION SUBCUTANEOUS EVERY 12 HOURS SCHEDULED
Qty: 48 ML | Refills: 0 | Status: SHIPPED | OUTPATIENT
Start: 2024-06-24 | End: 2024-06-27 | Stop reason: ALTCHOICE

## 2024-06-24 RX ADMIN — TESTOSTERONE CYPIONATE 200 MG: 200 INJECTION, SOLUTION INTRAMUSCULAR at 08:06

## 2024-06-24 NOTE — TELEPHONE ENCOUNTER
"After his venous duplex ultrasound, Bill return to our office to discuss the results in person.  I called him later to discuss treatment after consulting with pharmacist, Jael Zelaya, Pharm.D. and our on-call physician, Dr. Jeannette MD.  After reviewing his medications and potential treatment options for his DVT, it was determined that the safest and most effective treatment was Coumadin with a Lovenox bridge.     I discussed this treatment plan in detail with him, but unfortunately he was adamant about not using an injectable medication.  I explained it would only be temporary because the Coumadin takes some time to reach a therapeutic level.  I stressed that if he does not use the Lovenox with the Coumadin, he is essentially delaying treatment of his DVT and that this could be life-threatening.  I even stated, \"if the blood clot moved from your leg to your lung you could potentially dropdead.\"  Unfortunately, he persisted in refusing the subcutaneous therapy.    I told him I have already sent this to the pharmacy and I asked him to speak to his family members about his diagnosis and our treatment plan.  I also encouraged him to speak with the pharmacist to learn more about the actual injection process before making this decision.  He has follow-up scheduled with Dr. Machado on 6/27/2024.  She is aware of his refusal of the Lovenox.  "

## 2024-06-25 ENCOUNTER — TELEPHONE (OUTPATIENT)
Dept: NEUROLOGY | Facility: CLINIC | Age: 74
End: 2024-06-25
Payer: MEDICARE

## 2024-06-25 NOTE — TELEPHONE ENCOUNTER
Patient stopped in stating he had an appt with Ingrid for today, stated that he received a text. I informed him that we just seen him 2 wks ago here. He stated he didn't remember being seen here then because he wanted to speak with her about some places on his head and hip. I told him that we don't see for those things and that we see him for tremors. He stated that he mixed things up and will speak with Britney about those on Thursday.

## 2024-06-27 ENCOUNTER — OFFICE VISIT (OUTPATIENT)
Dept: FAMILY MEDICINE CLINIC | Age: 74
End: 2024-06-27
Payer: MEDICARE

## 2024-06-27 ENCOUNTER — TELEPHONE (OUTPATIENT)
Dept: FAMILY MEDICINE CLINIC | Age: 74
End: 2024-06-27
Payer: MEDICARE

## 2024-06-27 VITALS
DIASTOLIC BLOOD PRESSURE: 89 MMHG | HEART RATE: 105 BPM | BODY MASS INDEX: 28.32 KG/M2 | WEIGHT: 191.2 LBS | OXYGEN SATURATION: 97 % | HEIGHT: 69 IN | SYSTOLIC BLOOD PRESSURE: 148 MMHG

## 2024-06-27 DIAGNOSIS — J30.9 ALLERGIC RHINITIS, UNSPECIFIED SEASONALITY, UNSPECIFIED TRIGGER: ICD-10-CM

## 2024-06-27 DIAGNOSIS — I10 PRIMARY HYPERTENSION: ICD-10-CM

## 2024-06-27 DIAGNOSIS — Z51.81 ENCOUNTER FOR MONITORING COUMADIN THERAPY: ICD-10-CM

## 2024-06-27 DIAGNOSIS — E29.1 TESTICULAR HYPOFUNCTION: ICD-10-CM

## 2024-06-27 DIAGNOSIS — I82.412 ACUTE DEEP VEIN THROMBOSIS (DVT) OF FEMORAL VEIN OF LEFT LOWER EXTREMITY: Primary | ICD-10-CM

## 2024-06-27 DIAGNOSIS — Z79.01 ENCOUNTER FOR MONITORING COUMADIN THERAPY: ICD-10-CM

## 2024-06-27 DIAGNOSIS — G25.0 BENIGN ESSENTIAL TREMOR: ICD-10-CM

## 2024-06-27 PROBLEM — I82.409 DVT (DEEP VENOUS THROMBOSIS): Status: RESOLVED | Noted: 2024-06-27 | Resolved: 2024-06-27

## 2024-06-27 PROBLEM — I82.409 DVT (DEEP VENOUS THROMBOSIS): Status: ACTIVE | Noted: 2024-06-27

## 2024-06-27 LAB — INR PPP: 1.1 (ref 2–3)

## 2024-06-27 PROCEDURE — 3077F SYST BP >= 140 MM HG: CPT | Performed by: FAMILY MEDICINE

## 2024-06-27 PROCEDURE — 99214 OFFICE O/P EST MOD 30 MIN: CPT | Performed by: FAMILY MEDICINE

## 2024-06-27 PROCEDURE — 85610 PROTHROMBIN TIME: CPT | Performed by: FAMILY MEDICINE

## 2024-06-27 PROCEDURE — 3079F DIAST BP 80-89 MM HG: CPT | Performed by: FAMILY MEDICINE

## 2024-06-27 PROCEDURE — 95117 IMMUNOTHERAPY INJECTIONS: CPT | Performed by: FAMILY MEDICINE

## 2024-06-27 PROCEDURE — 36416 COLLJ CAPILLARY BLOOD SPEC: CPT | Performed by: FAMILY MEDICINE

## 2024-06-27 NOTE — TELEPHONE ENCOUNTER
Please call and inform Bill that Ingrid yanez reached out to me and said he should take half of his primidone pill twice a day

## 2024-06-27 NOTE — PROGRESS NOTES
Cesario Dueñas presents to [facility].    Chief Complaint: Follow-up to review INR and current treatment plan for acute DVT    Subjective     History of Present Illness:  HPI    This is a 73-year-old male presenting to clinic with concern for bilateral foot swelling.  This has been present for 2 or 3 weeks he is having minimal pain.  He recently started taking diclofenac for his back but states he only takes that 3-4 times a week.   He does not have any chest pain or shortness of breath.  He is currently on testosterone supplementation and was seen in clinic and had a venous duplex noted above showing a DVT in the left superficial femoral vein.  He was initiated on warfarin and Lovenox because there was a side effect of primidone with Xarelto or Eliquis.  Patient refused to stop the bridging blood thinner Lovenox and is only been on Coumadin 5 mg for 3 days.  His INR is 1.1.  I explained to him the significance of taking the Lovenox and he still defers.  I also printed off a handout on pulmonary embolism and DVT so he could have some better education and understand the significant risk he is putting himself under but not get doing the Lovenox as a bridging blood thinner.  At this point he has agreed to stop the Coumadin and take the Xarelto which will stabilize the blood clot within 24 hours.  He also understands he needs to stop all testosterone injections and I will reach out to his neurologist about the primidone dose in the drug drug interactions with it and Xarelto.  He may have to stop the primidone while on the Xarelto.      US Venous Doppler Lower Extremity Bilateral (duplex)    Result Date: 6/24/2024  Impression: 1.Thrombus within left superficial femoral vein. 2.No evidence of thrombus within right lower extremity venous system. Results are being called to ordering clinician. Electronically Signed: Cresencio Yeager MD  6/24/2024 2:40 PM EDT  Workstation ID: [id number]   "      Past medical history significant for hypertension.  Blood pressure is mildly elevated today.  He is currently on lisinopril 20 mg daily and tolerates med well    Result Review   The following data was reviewed by Britney Machado MD on 06/27/2024.  Lab Results   Component Value Date    WBC 4.80 02/08/2024    HGB 14.0 02/08/2024    HCT 41.5 02/08/2024    .8 (H) 02/08/2024     02/08/2024     Lab Results   Component Value Date    GLUCOSE 85 06/21/2024    BUN 13 06/21/2024    CREATININE 1.07 06/21/2024    EGFRRESULT >60 06/23/2022    EGFR 73.7 06/21/2024    BCR 12.1 06/21/2024    K 4.4 06/21/2024    CO2 25.8 06/21/2024    CALCIUM 8.3 (L) 06/21/2024    ALBUMIN 4.0 06/21/2024    BILITOT 0.3 06/21/2024    AST 28 06/21/2024    ALT 17 06/21/2024     Lab Results   Component Value Date    CHOL 175 02/08/2024    TRIG 38 02/08/2024     (H) 02/08/2024    LDL 61 02/08/2024     Lab Results   Component Value Date    TSH 3.850 03/16/2023     No results found for: \"HGBA1C\"  Lab Results   Component Value Date    PSA 0.371 02/08/2024    PSA 0.953 10/06/2022    PSA 0.553 09/30/2021         Lab Results   Component Value Date    CPXM87AR 42.5 03/16/2023               Assessment and Plan:   Diagnoses and all orders for this visit:    1. Acute deep vein thrombosis (DVT) of femoral vein of left lower extremity (Primary)  Comments:  Denies calf pain and has minimal edema of the left ankle.No shortness of air or chest pain.Will change Coumadin to Xarelto for better blood thinning protection  Orders:  -     Protime-INR; Future  -     POC INR    2. Encounter for monitoring Coumadin therapy  -     Protime-INR; Future  -     POC INR    3. Allergic rhinitis, unspecified seasonality, unspecified trigger  Comments:  He got his allergy shot today  Orders:  -     Allergy Serum Injection  -     Allergy Serum Injection    4. Testicular hypofunction  Comments:  Will stop his testosterone supplementation    5. Benign essential " tremor  Comments:  Will reach out to neurology to see if we need to stop this medicine or cut back on this current dose    6. Primary hypertension  Comments:  Borderline.  Recommend he avoid sodium in diet and check home BP and call if> 140/90    Other orders  -     Rivaroxaban (XARELTO) tablet therapy pack starter pack; Take one 15 mg tablet twice daily with food for 21 days.  Followed by one 20 mg tablet by mouth once daily with food. Take as directed  Dispense: 51 tablet; Refill: 0       I  printed off a handout on pulmonary embolism and DVT so he could have some better education and understand the significant risk he is putting himself under but not get doing the Lovenox as a bridging blood thinner.  At this point he has agreed to stop the Coumadin and take the Xarelto which will stabilize the blood clot within 24 hours.  He also understands he needs to stop all testosterone injections and I will reach out to his neurologist about the primidone dose in the drug drug interactions with it and Xarelto.  He may have to stop the primidone while on the Xarelto.I spoke with his neurologist Ingrid yanez who recommends that he should take half of his primidone pill twice a day while on Xarelto.  Dr. Machado        Objective     Medications:  Current Outpatient Medications   Medication Instructions    ALPRAZolam (XANAX) 0.5 MG tablet 1 tablet, Oral, Nightly PRN    atorvastatin (LIPITOR) 40 mg, Oral, Daily    cholecalciferol (VITAMIN D3) 1,000 Units, Oral, As Needed    colestipol (COLESTID) 3 g, Oral, Daily    desvenlafaxine (PRISTIQ) 50 mg, Oral, Daily    gabapentin (NEURONTIN) 600 MG tablet 1 tablet, Oral, 2 times daily    levocetirizine (XYZAL) 5 MG tablet Oral, As Needed    lisinopril (PRINIVIL,ZESTRIL) 20 mg, Oral, Daily    montelukast (SINGULAIR) 10 mg, Oral, Nightly    multivitamin with minerals tablet tablet 1 tablet, Oral, Daily    primidone (MYSOLINE) 250 MG tablet 1/2 tablet in the morning, 1 tablet at night.  "   Rivaroxaban (XARELTO) tablet therapy pack starter pack Take as directed    sildenafil (VIAGRA) 100 mg, Oral, As Needed        Vital Signs:   /89 (BP Location: Left arm, Patient Position: Sitting)   Pulse 105   Ht 175.3 cm (69.02\")   Wt 86.7 kg (191 lb 3.2 oz)   SpO2 97%   BMI 28.22 kg/m²             Physical Exam:  Physical Exam  Vitals and nursing note reviewed.   Constitutional:       General: He is not in acute distress.     Appearance: Normal appearance. He is not ill-appearing, toxic-appearing or diaphoretic.   HENT:      Head: Normocephalic and atraumatic.      Right Ear: Tympanic membrane, ear canal and external ear normal.      Left Ear: Tympanic membrane, ear canal and external ear normal.      Nose: No congestion or rhinorrhea.      Mouth/Throat:      Mouth: Mucous membranes are moist.      Pharynx: Oropharynx is clear. No oropharyngeal exudate or posterior oropharyngeal erythema.   Eyes:      Extraocular Movements: Extraocular movements intact.      Conjunctiva/sclera: Conjunctivae normal.      Pupils: Pupils are equal, round, and reactive to light.   Cardiovascular:      Rate and Rhythm: Normal rate and regular rhythm.      Heart sounds: Normal heart sounds.   Pulmonary:      Effort: Pulmonary effort is normal.      Breath sounds: Normal breath sounds. No wheezing, rhonchi or rales.   Abdominal:      General: Abdomen is flat.      Palpations: Abdomen is soft. There is no mass.      Tenderness: There is no abdominal tenderness.      Hernia: No hernia is present.   Musculoskeletal:      Cervical back: Neck supple. No rigidity.      Right lower leg: No edema.      Left lower leg: No edema.      Comments: Calves nontender palpation.  Very mild edema of the left ankle.  2+ pedal pulses in the feet bilaterally.  Skin color is good in the feet bilaterally with 2-3-second cap refill   Lymphadenopathy:      Cervical: No cervical adenopathy.   Skin:     General: Skin is warm and dry.   Neurological: "      General: No focal deficit present.      Mental Status: He is alert and oriented to person, place, and time. Mental status is at baseline.   Psychiatric:         Mood and Affect: Mood normal.         Behavior: Behavior normal.         Thought Content: Thought content normal.         Judgment: Judgment normal.           Review of Systems:  Review of Systems   Constitutional:  Negative for chills, fatigue and fever.   HENT:  Negative for congestion, ear discharge and sore throat.    Respiratory:  Negative for cough, shortness of breath and wheezing.    Cardiovascular:  Negative for chest pain, palpitations and leg swelling.   Gastrointestinal:  Negative for abdominal pain, constipation, diarrhea, nausea, vomiting and GERD.   Genitourinary:  Negative for flank pain.   Neurological:  Negative for dizziness and headache.   Psychiatric/Behavioral:  Negative for depressed mood.               Follow Up   No follow-ups on file.    Part of this note may be an electronic transcription/translation of spoken language to printed   text using the Dragon Dictation System.            Medical History:  Medications Discontinued During This Encounter   Medication Reason    Enoxaparin Sodium (LOVENOX) 80 MG/0.8ML solution prefilled syringe syringe Alternate therapy    warfarin (Coumadin) 5 MG tablet Alternate therapy      Past Medical History:    Hypertension     No past surgical history on file.   No family history on file.  Social History     Tobacco Use    Smoking status: Former     Current packs/day: 0.00     Types: Cigarettes     Quit date: 1980     Years since quittin.5     Passive exposure: Past    Smokeless tobacco: Never   Substance Use Topics    Alcohol use: Yes     Comment: maggi daily       Health Maintenance Due   Topic Date Due    ZOSTER VACCINE (1 of 2) Never done    COVID-19 Vaccine ( season) 2023    ANNUAL WELLNESS VISIT  2024        Immunization History   Administered Date(s)  Administered    COVID-19 (MODERNA) 1st,2nd,3rd Dose Monovalent 05/12/2021, 06/09/2021    COVID-19 (PFIZER) Purple Cap Monovalent 12/10/2021    FLUAD TRI 65YR+ 10/21/2019, 10/09/2020    Fluzone High-Dose 65+yrs 11/12/2021, 10/21/2022, 10/24/2023    Fluzone Quad >6mos (Multi-dose) 10/09/2020    Hepatitis A 05/17/2018, 11/23/2018    Pneumococcal Conjugate 13-Valent (PCV13) 10/21/2015    Pneumococcal Polysaccharide (PPSV23) 12/31/2015    Td (TDVAX) 08/03/2012    Tdap 04/07/2024       No Known Allergies

## 2024-06-27 NOTE — TELEPHONE ENCOUNTER
Onset: Patient was by his grandparents yesterday and fell and hit his head on corner of coffee table about 4-5pm  Has a cut on middle of forehead with some minor swelling. (no bruising per mom).  Mom states that her dad said infant cried after, never lost consciousness, and acted fine for the rest of the time he was with them.  He acted fine, playing alert, eating ok, no issues for the rest of the day/night.  Last night starting at 11pm got up vomited, 2am, 5am and then this morning again. (4 times had \"bouts of vomiting\") per mom. Otherwise acting normal.      Location/description: as above    Associated Symptoms: cold symptoms-runny nose, cough    What improves/worsens symptoms: as above    Symptom specific medications: none    Input and Output: no issues    Activity level: no issues, playful, alert and active    Temperature (route and time): none    Weight:     Wt Readings from Last 1 Encounters:   01/15/19 11.4 kg (66 %, Z= 0.42)*     * Growth percentiles are based on WHO (Boys, 0-2 years) data.       Recent Care: yesterday ER    Given care advise per trauma head protocol.  Advised to go to ER.      Reason for Disposition  • [1] Vomited 2 or more times AND [2] within 3 days of injury    Protocols used: TRAUMA - HEAD-P-       lmtrc

## 2024-07-10 ENCOUNTER — OFFICE VISIT (OUTPATIENT)
Dept: FAMILY MEDICINE CLINIC | Age: 74
End: 2024-07-10
Payer: MEDICARE

## 2024-07-10 VITALS
TEMPERATURE: 99.1 F | HEIGHT: 69 IN | SYSTOLIC BLOOD PRESSURE: 131 MMHG | OXYGEN SATURATION: 98 % | HEART RATE: 88 BPM | BODY MASS INDEX: 29.65 KG/M2 | WEIGHT: 200.2 LBS | DIASTOLIC BLOOD PRESSURE: 91 MMHG

## 2024-07-10 DIAGNOSIS — J30.9 ALLERGIC RHINITIS, UNSPECIFIED SEASONALITY, UNSPECIFIED TRIGGER: ICD-10-CM

## 2024-07-10 DIAGNOSIS — I82.412 ACUTE DEEP VEIN THROMBOSIS (DVT) OF FEMORAL VEIN OF LEFT LOWER EXTREMITY: ICD-10-CM

## 2024-07-10 DIAGNOSIS — E78.2 MIXED HYPERLIPIDEMIA: ICD-10-CM

## 2024-07-10 DIAGNOSIS — I10 PRIMARY HYPERTENSION: ICD-10-CM

## 2024-07-10 DIAGNOSIS — F41.1 GENERALIZED ANXIETY DISORDER: ICD-10-CM

## 2024-07-10 DIAGNOSIS — Z00.00 MEDICARE ANNUAL WELLNESS VISIT, SUBSEQUENT: Primary | ICD-10-CM

## 2024-07-10 DIAGNOSIS — G25.0 BENIGN ESSENTIAL TREMOR: ICD-10-CM

## 2024-07-10 DIAGNOSIS — M50.30 DEGENERATIVE CERVICAL DISC: ICD-10-CM

## 2024-07-10 PROCEDURE — 3080F DIAST BP >= 90 MM HG: CPT | Performed by: NURSE PRACTITIONER

## 2024-07-10 PROCEDURE — 1160F RVW MEDS BY RX/DR IN RCRD: CPT | Performed by: NURSE PRACTITIONER

## 2024-07-10 PROCEDURE — 95117 IMMUNOTHERAPY INJECTIONS: CPT | Performed by: NURSE PRACTITIONER

## 2024-07-10 PROCEDURE — 99214 OFFICE O/P EST MOD 30 MIN: CPT | Performed by: NURSE PRACTITIONER

## 2024-07-10 PROCEDURE — G0439 PPPS, SUBSEQ VISIT: HCPCS | Performed by: NURSE PRACTITIONER

## 2024-07-10 PROCEDURE — 3075F SYST BP GE 130 - 139MM HG: CPT | Performed by: NURSE PRACTITIONER

## 2024-07-10 PROCEDURE — 1170F FXNL STATUS ASSESSED: CPT | Performed by: NURSE PRACTITIONER

## 2024-07-10 PROCEDURE — 1159F MED LIST DOCD IN RCRD: CPT | Performed by: NURSE PRACTITIONER

## 2024-07-10 RX ORDER — LISINOPRIL 20 MG/1
20 TABLET ORAL DAILY
Qty: 90 TABLET | Refills: 1 | Status: SHIPPED | OUTPATIENT
Start: 2024-07-10

## 2024-07-10 RX ORDER — TRAMADOL HYDROCHLORIDE 50 MG/1
TABLET ORAL
Qty: 24 TABLET | Refills: 0 | Status: SHIPPED | OUTPATIENT
Start: 2024-07-10

## 2024-07-10 RX ORDER — ATORVASTATIN CALCIUM 40 MG/1
40 TABLET, FILM COATED ORAL DAILY
Qty: 90 TABLET | Refills: 1 | Status: SHIPPED | OUTPATIENT
Start: 2024-07-10

## 2024-07-10 RX ORDER — LAMOTRIGINE 200 MG/1
200 TABLET ORAL DAILY
COMMUNITY

## 2024-07-10 NOTE — ASSESSMENT & PLAN NOTE
May continue to take Tylenol per package instructions. Since he is not able to take NSAIDs, will send in rx Tramadol for occasional use only. He is aware that this is intended for short term and that this is a controlled substance. Declines PT today. Will let me know if he changes his mind.

## 2024-07-10 NOTE — PROGRESS NOTES
The ABCs of the Annual Wellness Visit  Subsequent Medicare Wellness Visit    Subjective    Cesario Dueñas is a 73 y.o. male who presents for a Subsequent Medicare Wellness Visit.    The following portions of the patient's history were reviewed and   updated as appropriate: allergies, current medications, past family history, past medical history, past social history, past surgical history, and problem list.    Compared to one year ago, the patient feels his physical   health is worse. Due to DVT, neck pain.     Compared to one year ago, the patient feels his mental   health is the same.    Recent Hospitalizations:  He was not admitted to the hospital during the last year.       Current Medical Providers:  Patient Care Team:  Lenka Milligan APRN as PCP - General (Nurse Practitioner)  Clive Singh MD as Consulting Physician (Psychiatry)  Ingrid Chiu APRN as Nurse Practitioner (Neurology)    Outpatient Medications Prior to Visit   Medication Sig Dispense Refill    ALPRAZolam (XANAX) 0.5 MG tablet Take 1 tablet by mouth At Night As Needed.      cholecalciferol (VITAMIN D3) 25 MCG (1000 UT) tablet Take 1 tablet by mouth As Needed.      colestipol (COLESTID) 1 g tablet Take 3 tablets by mouth Daily. 270 tablet 0    desvenlafaxine (PRISTIQ) 50 MG 24 hr tablet Take 1 tablet by mouth Daily.      gabapentin (NEURONTIN) 600 MG tablet Take 1 tablet by mouth 2 (two) times a day.      lamoTRIgine (LaMICtal) 200 MG tablet Take 1 tablet by mouth Daily.      levocetirizine (XYZAL) 5 MG tablet Take  by mouth As Needed.      montelukast (SINGULAIR) 10 MG tablet Take 1 tablet by mouth Every Night.      multivitamin with minerals tablet tablet Take 1 tablet by mouth Daily.      primidone (MYSOLINE) 250 MG tablet 1/2 tablet in the morning, 1 tablet at night. (Patient taking differently: 1/2 tablet in the morning, 1/2 tablet at night.) 135 tablet 3    Rivaroxaban (XARELTO) tablet therapy pack starter pack Take one 15 mg  tablet twice daily with food for 21 days.  Followed by one 20 mg tablet by mouth once daily with food. Take as directed 51 tablet 0    sildenafil (VIAGRA) 100 MG tablet Take 1 tablet by mouth As Needed for Erectile Dysfunction. 30 tablet 0    atorvastatin (LIPITOR) 40 MG tablet Take 1 tablet by mouth Daily. 90 tablet 0    lisinopril (PRINIVIL,ZESTRIL) 20 MG tablet Take 1 tablet by mouth Daily. 90 tablet 0     Facility-Administered Medications Prior to Visit   Medication Dose Route Frequency Provider Last Rate Last Admin    Allergy Serum Injection  0.1 mL Subcutaneous Once Lenka Milligan APRN        Allergy Serum Injection  0.1 mL Subcutaneous Once Lenka Milligan APRN        Testosterone Cypionate (DEPOTESTOTERONE CYPIONATE) injection 200 mg  200 mg Intramuscular Q30 Days Britney Machado MD   200 mg at 06/24/24 0806       Opioid medication/s are on active medication list.  and I have evaluated his active treatment plan and pain score trends (see table).  There were no vitals filed for this visit.  I have reviewed the chart for potential of high risk medication and harmful drug interactions in the elderly.          Aspirin is not on active medication list.   He is currently on xarelto .    Patient Active Problem List   Diagnosis    Hyperlipidemia, unspecified    Generalized anxiety disorder    Other specified forms of tremor    Allergic rhinitis, unspecified    Other male erectile dysfunction    Testosterone deficiency    History of colon polyps    Vitamin D deficiency    Hypertension    Benign essential tremor    High risk medication use    Degenerative cervical disc    Acute deep vein thrombosis (DVT) of femoral vein of left lower extremity     Advance Care Planning   Advance Care Planning     Advance Directive is not on file.  ACP discussion was held with the patient during this visit. Patient does not have an advance directive, declines further assistance.     Objective    Vitals:    07/10/24 1103   BP:  "131/91   BP Location: Left arm   Patient Position: Sitting   Cuff Size: Large Adult   Pulse: 88   Temp: 99.1 °F (37.3 °C)   TempSrc: Oral   SpO2: 98%   Weight: 90.8 kg (200 lb 3.2 oz)   Height: 175.3 cm (69.02\")     Estimated body mass index is 29.55 kg/m² as calculated from the following:    Height as of this encounter: 175.3 cm (69.02\").    Weight as of this encounter: 90.8 kg (200 lb 3.2 oz).           Does the patient have evidence of cognitive impairment? No          HEALTH RISK ASSESSMENT    Smoking Status:  Social History     Tobacco Use   Smoking Status Former    Current packs/day: 0.00    Types: Cigarettes    Quit date:     Years since quittin.5    Passive exposure: Past   Smokeless Tobacco Never     Alcohol Consumption:  Social History     Substance and Sexual Activity   Alcohol Use Yes    Comment: maggi daily     Fall Risk Screen:    SANJU Fall Risk Assessment was completed, and patient is at HIGH risk for falls. Assessment completed on:7/10/2024    Depression Screenin/10/2024    11:04 AM   PHQ-2/PHQ-9 Depression Screening   Little Interest or Pleasure in Doing Things 0-->not at all   Feeling Down, Depressed or Hopeless 0-->not at all   PHQ-9: Brief Depression Severity Measure Score 0       Health Habits and Functional and Cognitive Screenin/10/2024    11:04 AM   Functional & Cognitive Status   Do you have difficulty preparing food and eating? No   Do you have difficulty bathing yourself, getting dressed or grooming yourself? No   Do you have difficulty using the toilet? No   Do you have difficulty moving around from place to place? No   Do you have trouble with steps or getting out of a bed or a chair? No   Current Diet Unhealthy Diet   Dental Exam Up to date   Eye Exam Up to date   Exercise (times per week) 0 times per week   Current Exercises Include No Regular Exercise   Do you need help using the phone?  No   Are you deaf or do you have serious difficulty hearing?  No "   Do you need help to go to places out of walking distance? Yes   Do you need help shopping? No   Do you need help preparing meals?  No   Do you need help with housework?  No   Do you need help with laundry? No   Do you need help taking your medications? No   Do you need help managing money? No   Do you ever drive or ride in a car without wearing a seat belt? No   Have you felt unusual stress, anger or loneliness in the last month? No   Who do you live with? Alone   If you need help, do you have trouble finding someone available to you? No   Have you been bothered in the last four weeks by sexual problems? No   Do you have difficulty concentrating, remembering or making decisions? No       Age-appropriate Screening Schedule:  Refer to the list below for future screening recommendations based on patient's age, sex and/or medical conditions. Orders for these recommended tests are listed in the plan section. The patient has been provided with a written plan.    Health Maintenance   Topic Date Due    ZOSTER VACCINE (1 of 2) 07/10/2024 (Originally 9/27/2001)    COVID-19 Vaccine (4 - 2023-24 season) 07/12/2024 (Originally 9/1/2023)    Pneumococcal Vaccine 65+ (3 of 3 - PPSV23 or PCV20) 04/22/2025 (Originally 12/31/2020)    INFLUENZA VACCINE  08/01/2024    LIPID PANEL  02/08/2025    ANNUAL WELLNESS VISIT  07/10/2025    BMI FOLLOWUP  07/10/2025    COLORECTAL CANCER SCREENING  05/17/2032    TDAP/TD VACCINES (3 - Td or Tdap) 04/07/2034    HEPATITIS C SCREENING  Completed    AAA SCREEN (ONE-TIME)  Completed                  CMS Preventative Services Quick Reference  Risk Factors Identified During Encounter  Immunizations Discussed/Encouraged: Prevnar 20 (Pneumococcal 20-valent conjugate) and Shingrix  The above risks/problems have been discussed with the patient.  Pertinent information has been shared with the patient in the After Visit Summary.  An After Visit Summary and PPPS were made available to the patient.    Follow Up:  "  Next Medicare Wellness visit to be scheduled in 1 year.       Additional E&M Note during same encounter follows:  Patient has multiple medical problems which are significant and separately identifiable that require additional work above and beyond the Medicare Wellness Visit.      Chief Complaint  Medicare Wellness-subsequent    Subjective        HPI  Cesario Dueñas is on atorvastatin for hyperlipidemia and lisinopril for HTN.   He has seen neurology for benign essential tremors. He is on primidone.   He sees Dr Clive Singh psychiatry for depression/anxiety. He is on pristiq, gabapentin, and xanax.  He is currently on xarelto for LLE DVT. Declined Lovenox bridge. He has stopped testosterone and diclofenac. He has had some swelling in that extremity. Not currently using compression stocking but he plans to use.   C/o neck pain and low back pain. Xrays showed degenerative findings. Did note some improvement with steroid course. He declines PT today. Not able to take NSAIDs due to being on xarelto. Tylenol helped some.          Objective   Vital Signs:  /91 (BP Location: Left arm, Patient Position: Sitting, Cuff Size: Large Adult)   Pulse 88   Temp 99.1 °F (37.3 °C) (Oral)   Ht 175.3 cm (69.02\")   Wt 90.8 kg (200 lb 3.2 oz)   SpO2 98%   BMI 29.55 kg/m²     Physical Exam  Vitals reviewed.   Constitutional:       General: He is not in acute distress.     Appearance: Normal appearance. He is well-developed.   HENT:      Head: Normocephalic and atraumatic.   Cardiovascular:      Rate and Rhythm: Normal rate and regular rhythm.   Pulmonary:      Effort: Pulmonary effort is normal.      Breath sounds: Normal breath sounds.   Musculoskeletal:      Comments: Movement slowed  LLE with mild ankle swelling. Pedal pulses palpable. Brisk capillary refill.    Neurological:      Mental Status: He is alert and oriented to person, place, and time.   Psychiatric:         Mood and Affect: Mood and affect normal.      "                   Assessment & Plan  Medicare annual wellness visit, subsequent  Appropriate screenings and vaccinations were reviewed with the pt and offered as indicated.  Pt counseled on healthy lifestyle including healthy diet, exercise.    Primary hypertension    Medical condition is stable.  Continue same therapy.  Will recheck at next regular appointment    Mixed hyperlipidemia     Medical condition is stable.  Continue same therapy.  Will recheck at next regular appointment    Degenerative cervical disc  May continue to take Tylenol per package instructions. Since he is not able to take NSAIDs, will send in rx Tramadol for occasional use only. He is aware that this is intended for short term and that this is a controlled substance. Declines PT today. Will let me know if he changes his mind.   Allergic rhinitis, unspecified seasonality, unspecified trigger  Received allergy injection today.   Benign essential tremor  Will continue follow up with neurology.   Generalized anxiety disorder    Continue follow up with psychiatry per their recommendations.   Acute deep vein thrombosis (DVT) of femoral vein of left lower extremity  He will continue xarelto. He will start to use compression stocking. Plan to repeat venous doppler in 3 months. Should be seen sooner for any concerns.      New Medications Ordered This Visit   Medications    lisinopril (PRINIVIL,ZESTRIL) 20 MG tablet     Sig: Take 1 tablet by mouth Daily.     Dispense:  90 tablet     Refill:  1    atorvastatin (LIPITOR) 40 MG tablet     Sig: Take 1 tablet by mouth Daily.     Dispense:  90 tablet     Refill:  1    traMADol (ULTRAM) 50 MG tablet     Sig: Take 1-2 tablets po every 6 hours prn     Dispense:  24 tablet     Refill:  0    Allergy Serum Injection    Allergy Serum Injection    rivaroxaban (XARELTO) 20 MG tablet     Sig: Take 1 tablet by mouth Daily.     Dispense:  90 tablet     Refill:  0          Follow Up   Return in about 3 months (around  10/10/2024) for Recheck.  Patient was given instructions and counseling regarding his condition or for health maintenance advice. Please see specific information pulled into the AVS if appropriate.

## 2024-07-10 NOTE — ASSESSMENT & PLAN NOTE
He will continue xarelto. He will start to use compression stocking. Plan to repeat venous doppler in 3 months. Should be seen sooner for any concerns.    Awake/Alert

## 2024-07-26 ENCOUNTER — TELEPHONE (OUTPATIENT)
Dept: FAMILY MEDICINE CLINIC | Age: 74
End: 2024-07-26
Payer: MEDICARE

## 2024-07-26 DIAGNOSIS — M50.30 DEGENERATIVE CERVICAL DISC: ICD-10-CM

## 2024-07-26 RX ORDER — TRAMADOL HYDROCHLORIDE 50 MG/1
TABLET ORAL
Qty: 24 TABLET | Refills: 0 | Status: SHIPPED | OUTPATIENT
Start: 2024-07-26

## 2024-07-26 NOTE — TELEPHONE ENCOUNTER
Pt states he is out of pain med, he is requesting refill, but states it only helps a little, the pain seems to be worse now in his back and neck. Please advise.

## 2024-07-26 NOTE — TELEPHONE ENCOUNTER
"  Caller: Cesario Dueñas \"Bill\"    Relationship: Self    Best call back number: [phone]     What is the best time to reach you: ANYTIME     Who are you requesting to speak with (clinical staff, provider,  specific staff member): CLINICAL       What was the call regarding: PATIENT IS CALLING REQUESTING TO SPEAK WITH THE NURSE IN REGARDS TO NECK AND BACK PAIN THAT THE PATIENT IS HAVING.   "

## 2024-07-26 NOTE — TELEPHONE ENCOUNTER
Pt inf, he did not want to wait for Lenka's next available, sched him with different provider sooner.

## 2024-08-07 ENCOUNTER — TELEPHONE (OUTPATIENT)
Dept: FAMILY MEDICINE CLINIC | Age: 74
End: 2024-08-07
Payer: MEDICARE

## 2024-08-07 NOTE — TELEPHONE ENCOUNTER
Pt was seen by Dr Machado on 6/27/24 for DVT, lab order for Protime INR was placed but pt did not complete. Pt went to ER on 7/5/24 and Protime INR was completed is it okay to cancel lab order that was ordered by Dr Machado on 6/27/24?

## 2024-08-28 ENCOUNTER — LAB (OUTPATIENT)
Dept: LAB | Facility: HOSPITAL | Age: 74
End: 2024-08-28
Payer: MEDICARE

## 2024-08-28 ENCOUNTER — OFFICE VISIT (OUTPATIENT)
Dept: FAMILY MEDICINE CLINIC | Age: 74
End: 2024-08-28
Payer: MEDICARE

## 2024-08-28 VITALS
TEMPERATURE: 98.3 F | SYSTOLIC BLOOD PRESSURE: 134 MMHG | BODY MASS INDEX: 28.97 KG/M2 | HEART RATE: 72 BPM | WEIGHT: 195.6 LBS | OXYGEN SATURATION: 100 % | DIASTOLIC BLOOD PRESSURE: 84 MMHG | HEIGHT: 69 IN

## 2024-08-28 DIAGNOSIS — M51.36 LUMBAR DEGENERATIVE DISC DISEASE: ICD-10-CM

## 2024-08-28 DIAGNOSIS — I82.412 ACUTE DEEP VEIN THROMBOSIS (DVT) OF FEMORAL VEIN OF LEFT LOWER EXTREMITY: ICD-10-CM

## 2024-08-28 DIAGNOSIS — M25.50 ARTHRALGIA, UNSPECIFIED JOINT: ICD-10-CM

## 2024-08-28 DIAGNOSIS — M25.50 ARTHRALGIA, UNSPECIFIED JOINT: Primary | ICD-10-CM

## 2024-08-28 PROCEDURE — 86038 ANTINUCLEAR ANTIBODIES: CPT

## 2024-08-28 PROCEDURE — 87484 EHRLICHA CHAFFEENSIS AMP PRB: CPT

## 2024-08-28 PROCEDURE — 87798 DETECT AGENT NOS DNA AMP: CPT

## 2024-08-28 PROCEDURE — 87468 ANAPLSMA PHGCYTOPHLM AMP PRB: CPT

## 2024-08-28 PROCEDURE — 3075F SYST BP GE 130 - 139MM HG: CPT | Performed by: NURSE PRACTITIONER

## 2024-08-28 PROCEDURE — 86618 LYME DISEASE ANTIBODY: CPT

## 2024-08-28 PROCEDURE — 1159F MED LIST DOCD IN RCRD: CPT | Performed by: NURSE PRACTITIONER

## 2024-08-28 PROCEDURE — 99214 OFFICE O/P EST MOD 30 MIN: CPT | Performed by: NURSE PRACTITIONER

## 2024-08-28 PROCEDURE — 86140 C-REACTIVE PROTEIN: CPT

## 2024-08-28 PROCEDURE — 36415 COLL VENOUS BLD VENIPUNCTURE: CPT

## 2024-08-28 PROCEDURE — 86431 RHEUMATOID FACTOR QUANT: CPT

## 2024-08-28 PROCEDURE — 85652 RBC SED RATE AUTOMATED: CPT

## 2024-08-28 PROCEDURE — 1160F RVW MEDS BY RX/DR IN RCRD: CPT | Performed by: NURSE PRACTITIONER

## 2024-08-28 PROCEDURE — 3079F DIAST BP 80-89 MM HG: CPT | Performed by: NURSE PRACTITIONER

## 2024-08-28 NOTE — PROGRESS NOTES
Chief Complaint  Cesario Dueñas presents to [facility] for Arthritis (Discuss possible referral)    Subjective          History of Present Illness    Bill is here today with c/o joint pain. Notes that this is in multiple joints but seems worse in the neck and low back. Some degenerative findings on xrays of Cspine and Lspine previously. He feels that he has been losing balance. Feels like his legs and back are giving out on him. He was previously on diclofenac but stopped when started on xarelto for LLE DVT. Has tried tramadol but does not seem to be helping much.   Currently on Xarelto for LLE DVT. Needs refill.     Review of Systems      No Known Allergies   Past Medical History:   Diagnosis Date    Hypertension      Current Outpatient Medications   Medication Sig Dispense Refill    ALPRAZolam (XANAX) 0.5 MG tablet Take 1 tablet by mouth At Night As Needed.      atorvastatin (LIPITOR) 40 MG tablet Take 1 tablet by mouth Daily. 90 tablet 1    cholecalciferol (VITAMIN D3) 25 MCG (1000 UT) tablet Take 1 tablet by mouth As Needed.      colestipol (COLESTID) 1 g tablet Take 3 tablets by mouth Daily. 270 tablet 0    desvenlafaxine (PRISTIQ) 50 MG 24 hr tablet Take 1 tablet by mouth Daily.      gabapentin (NEURONTIN) 600 MG tablet Take 1 tablet by mouth 2 (two) times a day.      lamoTRIgine (LaMICtal) 200 MG tablet Take 1 tablet by mouth Daily.      levocetirizine (XYZAL) 5 MG tablet Take  by mouth As Needed.      lisinopril (PRINIVIL,ZESTRIL) 20 MG tablet Take 1 tablet by mouth Daily. 90 tablet 1    montelukast (SINGULAIR) 10 MG tablet Take 1 tablet by mouth Every Night.      multivitamin with minerals tablet tablet Take 1 tablet by mouth Daily.      primidone (MYSOLINE) 250 MG tablet 1/2 tablet in the morning, 1 tablet at night. (Patient taking differently: 1/2 tablet in the morning, 1/2 tablet at night.) 135 tablet 3    rivaroxaban (XARELTO) 20 MG tablet Take 1 tablet by mouth  "Daily. 90 tablet 0    sildenafil (VIAGRA) 100 MG tablet Take 1 tablet by mouth As Needed for Erectile Dysfunction. 30 tablet 0    traMADol (ULTRAM) 50 MG tablet Take 1-2 tablets po every 6 hours prn 24 tablet 0     Current Facility-Administered Medications   Medication Dose Route Frequency Provider Last Rate Last Admin    Allergy Serum Injection  0.1 mL Subcutaneous Once Lenka Milligan APRN        Allergy Serum Injection  0.1 mL Subcutaneous Once Lenka Milligan APRN         History reviewed. No pertinent surgical history.   Social History     Tobacco Use    Smoking status: Former     Current packs/day: 0.00     Types: Cigarettes     Quit date:      Years since quittin.6     Passive exposure: Past    Smokeless tobacco: Never   Vaping Use    Vaping status: Never Used   Substance Use Topics    Alcohol use: Yes     Comment: whiskey daily    Drug use: Never     History reviewed. No pertinent family history.  Health Maintenance Due   Topic Date Due    INFLUENZA VACCINE  2024      Immunization History   Administered Date(s) Administered    COVID-19 (MODERNA) 1st,2nd,3rd Dose Monovalent 2021, 2021    COVID-19 (PFIZER) Purple Cap Monovalent 12/10/2021    FLUAD TRI 65YR+ 10/21/2019, 10/09/2020    Fluzone High-Dose 65+yrs 2021, 10/21/2022, 10/24/2023    Fluzone Quad >6mos (Multi-dose) 10/09/2020    Hepatitis A 2018, 2018    Pneumococcal Conjugate 13-Valent (PCV13) 10/21/2015    Pneumococcal Polysaccharide (PPSV23) 2015    Td (TDVAX) 2012    Tdap 2024        Objective     Vitals:    24 1051   BP: 134/84   Pulse: 72   Temp: 98.3 °F (36.8 °C)   TempSrc: Oral   SpO2: 100%   Weight: 88.7 kg (195 lb 9.6 oz)   Height: 175.3 cm (69.02\")     Body mass index is 28.87 kg/m².              No results found.    Physical Exam  Vitals reviewed.   Constitutional:       General: He is not in acute distress.     Appearance: Normal appearance. He is well-developed.   HENT:     "  Head: Normocephalic and atraumatic.   Cardiovascular:      Rate and Rhythm: Normal rate and regular rhythm.   Pulmonary:      Effort: Pulmonary effort is normal.      Breath sounds: Normal breath sounds.   Neurological:      Mental Status: He is alert and oriented to person, place, and time.   Psychiatric:         Mood and Affect: Mood and affect normal.           Result Review :                               Assessment and Plan      Assessment & Plan  Arthralgia, unspecified joint  Checking arthritis/autoimmune panel labs as well as tick panel labs.   Lumbar degenerative disc disease  With low back being a more aggravating area of discomfort for him as well as balance issues, will proceed with MRI. We also discussed possible PT, pain mgmt, or neurosurgery referral. Wishes to proceed with MRI at this time.   Acute deep vein thrombosis (DVT) of femoral vein of left lower extremity  Xarelto refilled    Orders Placed This Encounter   Procedures    Tick Panel - Blood, Arm, Left    MRI Lumbar Spine Without Contrast    TING Direct Reflex to 11 Biomarker    C-reactive protein    Rheumatoid Factor    Sedimentation rate     New Medications Ordered This Visit   Medications    rivaroxaban (XARELTO) 20 MG tablet     Sig: Take 1 tablet by mouth Daily.     Dispense:  90 tablet     Refill:  0                 Follow Up     Return for Next scheduled follow up, Or sooner as needed.

## 2024-08-29 LAB
CHROMATIN AB SERPL-ACNC: 11 IU/ML (ref 0–14)
CRP SERPL-MCNC: <0.3 MG/DL (ref 0–0.5)
ERYTHROCYTE [SEDIMENTATION RATE] IN BLOOD: 2 MM/HR (ref 0–20)

## 2024-08-30 ENCOUNTER — HOSPITAL ENCOUNTER (OUTPATIENT)
Dept: OTHER | Facility: HOSPITAL | Age: 74
Discharge: HOME | End: 2024-08-30

## 2024-08-30 LAB
ANA SER QL: NEGATIVE
B BURGDOR IGG+IGM SER QL IA: NEGATIVE

## 2024-09-02 LAB
A PHAGOCYTOPH DNA BLD QL NAA+PROBE: NEGATIVE
E CHAFFEENSIS DNA BLD QL NAA+PROBE: NEGATIVE

## 2024-09-03 DIAGNOSIS — M50.30 DEGENERATIVE CERVICAL DISC: ICD-10-CM

## 2024-09-03 LAB — RICKETTSIA RICKETTSII DNA, RT: NOT DETECTED

## 2024-09-04 ENCOUNTER — TELEPHONE (OUTPATIENT)
Dept: FAMILY MEDICINE CLINIC | Age: 74
End: 2024-09-04
Payer: MEDICARE

## 2024-09-04 RX ORDER — TRAMADOL HYDROCHLORIDE 50 MG/1
TABLET ORAL
Qty: 24 TABLET | Refills: 0 | Status: SHIPPED | OUTPATIENT
Start: 2024-09-04

## 2024-09-04 NOTE — TELEPHONE ENCOUNTER
"  Caller: Cesario Dueñas \"Bill\"    Relationship: Self    Best call back number: [phone]     What is the best time to reach you: ANY    Who are you requesting to speak with (clinical staff, provider,  specific staff member): CLINICAL    What was the call regarding: CALLER SEEKING CONFIRMATION THAT AIYANA ANGLIN RECEIVED THE MRI DISK THAT WAS LEFT AT THE OFFICE LAST WEEK.        "
Pt inf we can't access the disc, but we did receive paper result. Once Lenka has reviewed it, we will call him with results. He requested disc be shredded.  
negative

## 2024-09-06 DIAGNOSIS — M48.061 SPINAL STENOSIS OF LUMBAR REGION, UNSPECIFIED WHETHER NEUROGENIC CLAUDICATION PRESENT: Primary | ICD-10-CM

## 2024-09-26 ENCOUNTER — TELEPHONE (OUTPATIENT)
Dept: FAMILY MEDICINE CLINIC | Age: 74
End: 2024-09-26

## 2024-09-26 ENCOUNTER — APPOINTMENT (OUTPATIENT)
Dept: ULTRASOUND IMAGING | Facility: HOSPITAL | Age: 74
End: 2024-09-26
Payer: MEDICARE

## 2024-09-26 ENCOUNTER — OFFICE VISIT (OUTPATIENT)
Dept: FAMILY MEDICINE CLINIC | Age: 74
End: 2024-09-26
Payer: MEDICARE

## 2024-09-26 ENCOUNTER — HOSPITAL ENCOUNTER (OUTPATIENT)
Dept: GENERAL RADIOLOGY | Facility: HOSPITAL | Age: 74
Discharge: HOME | End: 2024-09-26
Payer: MEDICARE

## 2024-09-26 ENCOUNTER — HOSPITAL ENCOUNTER (OUTPATIENT)
Dept: ULTRASOUND IMAGING | Facility: HOSPITAL | Age: 74
Discharge: HOME | End: 2024-09-26
Payer: MEDICARE

## 2024-09-26 VITALS
HEART RATE: 96 BPM | SYSTOLIC BLOOD PRESSURE: 138 MMHG | HEIGHT: 69 IN | TEMPERATURE: 98.3 F | WEIGHT: 191.4 LBS | BODY MASS INDEX: 28.35 KG/M2 | OXYGEN SATURATION: 97 % | DIASTOLIC BLOOD PRESSURE: 90 MMHG

## 2024-09-26 DIAGNOSIS — E78.2 MIXED HYPERLIPIDEMIA: ICD-10-CM

## 2024-09-26 DIAGNOSIS — R07.81 RIB PAIN ON LEFT SIDE: ICD-10-CM

## 2024-09-26 DIAGNOSIS — R26.89 IMPAIRED GAIT AND MOBILITY: ICD-10-CM

## 2024-09-26 DIAGNOSIS — I82.412 ACUTE DEEP VEIN THROMBOSIS (DVT) OF FEMORAL VEIN OF LEFT LOWER EXTREMITY: ICD-10-CM

## 2024-09-26 DIAGNOSIS — M50.30 DEGENERATIVE CERVICAL DISC: ICD-10-CM

## 2024-09-26 DIAGNOSIS — M17.0 PRIMARY OSTEOARTHRITIS OF BOTH KNEES: ICD-10-CM
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
## 2024-09-26 DIAGNOSIS — R07.81 RIB PAIN ON LEFT SIDE: Primary | ICD-10-CM

## 2024-09-26 DIAGNOSIS — I10 PRIMARY HYPERTENSION: ICD-10-CM

## 2024-09-26 PROBLEM — E34.9 TESTOSTERONE DEFICIENCY: Status: RESOLVED | Noted: 2021-09-22 | Resolved: 2024-09-26

## 2024-09-26 PROCEDURE — 99214 OFFICE O/P EST MOD 30 MIN: CPT | Performed by: NURSE PRACTITIONER

## 2024-09-26 PROCEDURE — 3075F SYST BP GE 130 - 139MM HG: CPT | Performed by: NURSE PRACTITIONER

## 2024-09-26 PROCEDURE — 1159F MED LIST DOCD IN RCRD: CPT | Performed by: NURSE PRACTITIONER

## 2024-09-26 PROCEDURE — 93971 EXTREMITY STUDY: CPT

## 2024-09-26 PROCEDURE — 71101 X-RAY EXAM UNILAT RIBS/CHEST: CPT

## 2024-09-26 PROCEDURE — 1160F RVW MEDS BY RX/DR IN RCRD: CPT | Performed by: NURSE PRACTITIONER

## 2024-09-26 PROCEDURE — G2211 COMPLEX E/M VISIT ADD ON: HCPCS | Performed by: NURSE PRACTITIONER

## 2024-09-26 PROCEDURE — 3080F DIAST BP >= 90 MM HG: CPT | Performed by: NURSE PRACTITIONER

## 2024-09-26 RX ORDER — TRAMADOL HYDROCHLORIDE 50 MG/1
TABLET ORAL
Qty: 40 TABLET | Refills: 0 | Status: SHIPPED | OUTPATIENT
Start: 2024-09-26

## 2024-09-26 RX ORDER — ATORVASTATIN CALCIUM 40 MG/1
40 TABLET, FILM COATED ORAL DAILY
Qty: 90 TABLET | Refills: 0 | Status: SHIPPED | OUTPATIENT
Start: 2024-09-26

## 2024-09-26 RX ORDER — LISINOPRIL 20 MG/1
20 TABLET ORAL DAILY
Qty: 90 TABLET | Refills: 0 | Status: SHIPPED | OUTPATIENT
Start: 2024-09-26

## 2024-09-30 DIAGNOSIS — J18.9 PNEUMONIA OF LEFT LOWER LOBE DUE TO INFECTIOUS ORGANISM: Primary | ICD-10-CM

## 2024-09-30 RX ORDER — AZITHROMYCIN 250 MG/1
TABLET, FILM COATED ORAL
Qty: 6 TABLET | Refills: 0 | Status: SHIPPED | OUTPATIENT
Start: 2024-09-30

## 2024-10-25 ENCOUNTER — OFFICE VISIT (OUTPATIENT)
Dept: NEUROSURGERY | Facility: CLINIC | Age: 74
End: 2024-10-25
Payer: MEDICARE

## 2024-10-25 VITALS
WEIGHT: 193 LBS | BODY MASS INDEX: 28.58 KG/M2 | SYSTOLIC BLOOD PRESSURE: 145 MMHG | HEIGHT: 69 IN | OXYGEN SATURATION: 98 % | HEART RATE: 72 BPM | DIASTOLIC BLOOD PRESSURE: 89 MMHG

## 2024-10-25 DIAGNOSIS — M48.061 FORAMINAL STENOSIS OF LUMBAR REGION: ICD-10-CM

## 2024-10-25 DIAGNOSIS — M50.30 DEGENERATIVE CERVICAL DISC: ICD-10-CM

## 2024-10-25 DIAGNOSIS — M51.26 HERNIATED NUCLEUS PULPOSUS, L4-5: Primary | ICD-10-CM

## 2024-10-25 DIAGNOSIS — M47.816 FACET ARTHROPATHY, LUMBAR: ICD-10-CM

## 2024-10-25 DIAGNOSIS — M17.0 PRIMARY OSTEOARTHRITIS OF BOTH KNEES: ICD-10-CM

## 2024-10-25 DIAGNOSIS — M50.30 DEGENERATIVE CERVICAL DISC: Primary | ICD-10-CM

## 2024-10-25 RX ORDER — TRAMADOL HYDROCHLORIDE 100 MG/1
100 TABLET, COATED ORAL EVERY 8 HOURS PRN
Qty: 30 TABLET | Refills: 0 | Status: SHIPPED | OUTPATIENT
Start: 2024-10-25

## 2024-10-25 RX ORDER — TRAMADOL HYDROCHLORIDE 50 MG/1
TABLET ORAL
Qty: 40 TABLET | Refills: 0 | OUTPATIENT
Start: 2024-10-25

## 2024-10-25 NOTE — PROGRESS NOTES
"Chief Complaint  Establish Care (Spinal Stenosis Lumbar )    Subjective          Cesario Dueñas who is a 74 y.o. year old male who presents to [facility] for Evaluation of the Spine.     The patient complains of pain located in the Lumbar Spine.  Patients states the pain has been present for 5 months.  The pain came on gradually.  The pain scaled level is 0.  The pain  does not radiate .  The pain is Intermittent and described as  \"sore\" .  The pain is worse at no particular time of day. Patient states Rest makes the pain better.  Patient states Bending makes the pain worse.    Associated Symptoms Include:  Denies numbness, tingling, weakness or loss of bowel or bladder control.  Conservative Interventions Include: Pain Medications that were somewhat effective.    Was this the result of an injury or accident? : No    History of Previous Spinal Surgery?: No     reports that he quit smoking about 24 years ago. His smoking use included cigarettes. He started smoking about 44 years ago. He has a 40 pack-year smoking history. He has been exposed to tobacco smoke. He has never used smokeless tobacco.    Review of Systems   Musculoskeletal:  Positive for back pain.        Objective   Vital Signs:   /89 (BP Location: Left arm, Patient Position: Sitting, Cuff Size: Large Adult)   Pulse 72   Ht 175.3 cm (69.02\")   Wt 87.5 kg (193 lb)   SpO2 98%   BMI 28.49 kg/m²       Physical Exam  Constitutional:       Appearance: Normal appearance.   Pulmonary:      Effort: Pulmonary effort is normal.   Musculoskeletal:         General: Tenderness (minimal to the bilateral lumbar area) present.      Comments: SLR negative bilaterally   Neurological:      General: No focal deficit present.      Mental Status: He is alert and oriented to person, place, and time.      Sensory: No sensory deficit.      Motor: No weakness.      Deep Tendon Reflexes: Reflexes normal.   Psychiatric:         " Mood and Affect: Mood normal.        Neurological Exam  Mental Status  Alert. Oriented to person, place, and time.      Result Review     I personally interpreted the MRI of the lumbar spine without contrast from 8/30/2024 which shows moderate central canal narrowing at L4-L5 with severe bilateral foraminal narrowing.  There is also severe bilateral foraminal narrowing L5-S1.     Assessment and Plan    Diagnoses and all orders for this visit:    1. Herniated nucleus pulposus, L4-5 (Primary)    2. Foraminal stenosis of lumbar region    3. Facet arthropathy, lumbar    He reports no pain today. He does at times have pain in the back.    He denies any radiating pain in the legs.    He does have moderate central canal stenosis and severe bilateral foraminal narrowing at L4-L5. If he develops leg pain, he may be a candidate for surgery.    Surgery will not help the back pain.    He may consider PT vs pain management for spinal injections.    The patient was counseled on basic recommendations for the reduction and prevention of back, neck, or spine pain in association with spinal disorders, including: cessation/avoidance of nicotine use, maintenance of a healthy BMI and weight, focusing on building/maintaining core strength through core exercise, and avoidance of activities which worsen the pain. The patient will monitor for changes in symptoms and notify our clinic of these changes as needed.    Follow Up   Return if symptoms worsen or fail to improve.  Patient was given instructions and counseling regarding his condition or for health maintenance advice. Please see specific information pulled into the AVS if appropriate.

## 2024-10-25 NOTE — TELEPHONE ENCOUNTER
"Caller: Cesario Dueñas \"Bill\"    Relationship: Self    Best call back number: [phone]    Requested Prescriptions:   Requested Prescriptions     Pending Prescriptions Disp Refills    traMADol (ULTRAM) 50 MG tablet 40 tablet 0     Sig: TAKE ONE TO TWO TABLETS BY MOUTH EVERY 6 HOURS AS NEEDED        Pharmacy where request should be sent: [facility] [id number] [place], [place] - [address] [facility] - [phone]  - [phone] FX     Last office visit with prescribing clinician: 9/26/2024   Last telemedicine visit with prescribing clinician: Visit date not found   Next office visit with prescribing clinician: 12/27/2024       Does the patient have less than a 3 day supply:  [x] Yes  [] No    Would you like a call back once the refill request has been completed: [] Yes [x] No    If the office needs to give you a call back, can they leave a voicemail: [] Yes [x] No    Miriam Ulloa Rep   10/25/24 14:13 EDT           "

## 2024-10-25 NOTE — TELEPHONE ENCOUNTER
"    Caller: Cesario Dueñas \"Bill\"    Relationship to patient: Self    Best call back number: [phone]     Patient is needing: PATIENT STATES THAT DOSE WAS SUPPOSED TO BE INCREASED  MG THE LAST TIME IT WAS PRESCRIBED BUT PRESCRIPTION NEVER CHANGED. PLEASE ADVISE.          "

## 2024-11-05 ENCOUNTER — TELEPHONE (OUTPATIENT)
Dept: FAMILY MEDICINE CLINIC | Age: 74
End: 2024-11-05
Payer: MEDICARE

## 2024-11-05 NOTE — TELEPHONE ENCOUNTER
Pt is scheduled tomorrow with Jaime at 11:30am because I couldn't get him in with Srini until 11/12. He is needing to be seen because his arms are bruised and bleeding constantly, he is very concerned about this. I told him that I would send this message to let PCP know what was going on.

## 2024-11-06 ENCOUNTER — OFFICE VISIT (OUTPATIENT)
Dept: FAMILY MEDICINE CLINIC | Age: 74
End: 2024-11-06
Payer: MEDICARE

## 2024-11-06 VITALS
WEIGHT: 191.8 LBS | HEIGHT: 69 IN | TEMPERATURE: 98.2 F | DIASTOLIC BLOOD PRESSURE: 68 MMHG | BODY MASS INDEX: 28.41 KG/M2 | HEART RATE: 95 BPM | SYSTOLIC BLOOD PRESSURE: 116 MMHG

## 2024-11-06 DIAGNOSIS — M17.0 PRIMARY OSTEOARTHRITIS OF BOTH KNEES: ICD-10-CM

## 2024-11-06 DIAGNOSIS — M50.30 DEGENERATIVE CERVICAL DISC: Primary | ICD-10-CM

## 2024-11-06 DIAGNOSIS — M25.512 BILATERAL SHOULDER PAIN, UNSPECIFIED CHRONICITY: ICD-10-CM

## 2024-11-06 DIAGNOSIS — Z23 NEED FOR IMMUNIZATION AGAINST INFLUENZA: ICD-10-CM

## 2024-11-06 DIAGNOSIS — R23.3 EASY BRUISING: ICD-10-CM

## 2024-11-06 DIAGNOSIS — M25.511 BILATERAL SHOULDER PAIN, UNSPECIFIED CHRONICITY: ICD-10-CM

## 2024-11-06 PROCEDURE — 90662 IIV NO PRSV INCREASED AG IM: CPT | Performed by: STUDENT IN AN ORGANIZED HEALTH CARE EDUCATION/TRAINING PROGRAM

## 2024-11-06 PROCEDURE — 99214 OFFICE O/P EST MOD 30 MIN: CPT | Performed by: STUDENT IN AN ORGANIZED HEALTH CARE EDUCATION/TRAINING PROGRAM

## 2024-11-06 PROCEDURE — 3074F SYST BP LT 130 MM HG: CPT | Performed by: STUDENT IN AN ORGANIZED HEALTH CARE EDUCATION/TRAINING PROGRAM

## 2024-11-06 PROCEDURE — G0008 ADMIN INFLUENZA VIRUS VAC: HCPCS | Performed by: STUDENT IN AN ORGANIZED HEALTH CARE EDUCATION/TRAINING PROGRAM

## 2024-11-06 PROCEDURE — 3078F DIAST BP <80 MM HG: CPT | Performed by: STUDENT IN AN ORGANIZED HEALTH CARE EDUCATION/TRAINING PROGRAM

## 2024-11-06 RX ORDER — MELOXICAM 15 MG/1
7.5 TABLET ORAL DAILY
Qty: 30 TABLET | Refills: 1 | Status: SHIPPED | OUTPATIENT
Start: 2024-11-06

## 2024-11-06 NOTE — PROGRESS NOTES
Chief Complaint     Bleeding/Bruising (Bruising on arms)    History of Present Illness     Cesario Dueñas is a 74 y.o. male who presents to [facility] for evaluation of of bruising on arms. He states he goes to bed then wakes up with bruises and bleeding. He is on Xarelto.    He is also still having pain in his knees, shoulders, and back. He has been seeing ortho and received knee injections.          History      Past Medical History:   Diagnosis Date    Hypertension     Lumbosacral disc disease        No past surgical history on file.    No family history on file.     Current Medications        Current Outpatient Medications:     ALPRAZolam (XANAX) 0.5 MG tablet, Take 1 tablet by mouth At Night As Needed., Disp: , Rfl:     atorvastatin (LIPITOR) 40 MG tablet, Take 1 tablet by mouth Daily., Disp: 90 tablet, Rfl: 0    cholecalciferol (VITAMIN D3) 25 MCG (1000 UT) tablet, Take 1 tablet by mouth As Needed., Disp: , Rfl:     colestipol (COLESTID) 1 g tablet, Take 3 tablets by mouth Daily., Disp: 270 tablet, Rfl: 0    desvenlafaxine (PRISTIQ) 50 MG 24 hr tablet, Take 1 tablet by mouth Daily., Disp: , Rfl:     gabapentin (NEURONTIN) 600 MG tablet, Take 1 tablet by mouth 2 (two) times a day., Disp: , Rfl:     levocetirizine (XYZAL) 5 MG tablet, Take  by mouth As Needed., Disp: , Rfl:     lisinopril (PRINIVIL,ZESTRIL) 20 MG tablet, Take 1 tablet by mouth Daily., Disp: 90 tablet, Rfl: 0    montelukast (SINGULAIR) 10 MG tablet, Take 1 tablet by mouth Every Night., Disp: , Rfl:     multivitamin with minerals tablet tablet, Take 1 tablet by mouth Daily., Disp: , Rfl:     primidone (MYSOLINE) 250 MG tablet, 1/2 tablet in the morning, 1 tablet at night. (Patient taking differently: 1/2 tablet in the morning, 1/2 tablet at night.), Disp: 135 tablet, Rfl: 3    rivaroxaban (XARELTO) 20 MG tablet, Take 1 tablet by mouth Daily., Disp: 90 tablet, Rfl: 0    sildenafil (VIAGRA) 100 MG tablet, Take 1  "tablet by mouth As Needed for Erectile Dysfunction., Disp: 30 tablet, Rfl: 0    traMADol HCl (ULTRAM) 100 MG tablet, Take 1 tablet by mouth Every 8 (Eight) Hours As Needed (moderate pain)., Disp: 30 tablet, Rfl: 0    azithromycin (Zithromax Z-See) 250 MG tablet, Take 2 tablets by mouth on day 1, then 1 tablet daily on days 2-5, Disp: 6 tablet, Rfl: 0    meloxicam (Mobic) 15 MG tablet, Take 0.5 tablets by mouth Daily. 1/2 tab daily for 5 days then may increase to 1 tablet if needed, Disp: 30 tablet, Rfl: 1    Current Facility-Administered Medications:     Allergy Serum Injection, 0.1 mL, Subcutaneous, Once, Lenka Milligan APRN    Allergy Serum Injection, 0.1 mL, Subcutaneous, Once, Lenka Milligan APRN     Allergies     No Known Allergies    Social History       Social History     Social History Narrative    Not on file       Immunizations     Immunization:  Immunization History   Administered Date(s) Administered    COVID-19 (MODERNA) 1st,2nd,3rd Dose Monovalent 05/12/2021, 06/09/2021    COVID-19 (PFIZER) Purple Cap Monovalent 12/10/2021    FLUAD TRI 65YR+ 10/17/2018, 10/21/2019, 10/09/2020    Fluzone (or Fluarix & Flulaval for VFC) >6mos 09/25/2017    Fluzone High-Dose 65+YRS 11/06/2024    Fluzone High-Dose 65+yrs 11/12/2021, 10/21/2022, 10/24/2023    Fluzone Quad >6mos (Multi-dose) 10/09/2020    Hep A, 2 Dose 05/18/2018    Hepatitis A 05/17/2018, 11/23/2018    Pneumococcal Conjugate 13-Valent (PCV13) 10/21/2015    Pneumococcal Polysaccharide (PPSV23) 12/31/2015    Td (TDVAX) 08/03/2012    Tdap 04/07/2024          Objective     Objective     Vital Signs:   /68 (BP Location: Left arm, Patient Position: Sitting)   Pulse 95   Temp 98.2 °F (36.8 °C) (Oral)   Ht 175.3 cm (69\")   Wt 87 kg (191 lb 12.8 oz)   BMI 28.32 kg/m²       Physical Exam  Constitutional:       Appearance: Normal appearance. He is overweight.   HENT:      Head: Normocephalic.   Eyes:      Conjunctiva/sclera: Conjunctivae normal.      " Pupils: Pupils are equal, round, and reactive to light.   Cardiovascular:      Rate and Rhythm: Normal rate and regular rhythm.      Pulses: Normal pulses.      Heart sounds: Normal heart sounds.   Pulmonary:      Effort: Pulmonary effort is normal.      Breath sounds: Normal breath sounds.   Abdominal:      General: Bowel sounds are normal.      Palpations: Abdomen is soft.   Musculoskeletal:         General: Normal range of motion.      Cervical back: Normal range of motion and neck supple.   Skin:     General: Skin is warm and dry.      Findings: Abrasion and bruising present.      Comments: Bruising bilaterally upper arms, with skin tears noted.   Neurological:      General: No focal deficit present.      Mental Status: He is alert and oriented to person, place, and time.   Psychiatric:         Attention and Perception: Attention normal.         Mood and Affect: Mood and affect normal.         Behavior: Behavior normal.         Results    The following data was reviewed by: HUNG Zavala on 11/06/24         CMP          2/8/2024    08:05 6/21/2024    11:31   CMP   Glucose 92  85    BUN 13  13    Creatinine 1.00  1.07    EGFR 80.0  73.7    Sodium 139  137    Potassium 4.7  4.4    Chloride 103  101    Calcium 9.0  8.3    Total Protein 6.9  6.6    Albumin 4.3  4.0    Globulin 2.6  2.6    Total Bilirubin 0.2  0.3    Alkaline Phosphatase 100  122    AST (SGOT) 44  28    ALT (SGPT) 30  17    Albumin/Globulin Ratio 1.7  1.5    BUN/Creatinine Ratio 13.0  12.1    Anion Gap 9.4  10.2      CBC          2/8/2024    08:05   CBC   WBC 4.80    RBC 4.00    Hemoglobin 14.0    Hematocrit 41.5    .8    MCH 35.0    MCHC 33.7    RDW 13.1    Platelets 158             Assessment and Plan        Assessment and Plan       Degenerative cervical disc    Orders:    meloxicam (Mobic) 15 MG tablet; Take 0.5 tablets by mouth Daily. 1/2 tab daily for 5 days then may increase to 1 tablet if needed    Primary osteoarthritis of both  knees    Orders:    meloxicam (Mobic) 15 MG tablet; Take 0.5 tablets by mouth Daily. 1/2 tab daily for 5 days then may increase to 1 tablet if needed    Bilateral shoulder pain, unspecified chronicity    Orders:    meloxicam (Mobic) 15 MG tablet; Take 0.5 tablets by mouth Daily. 1/2 tab daily for 5 days then may increase to 1 tablet if needed  Should mention this to his orthopedic next time he is there as they may be able to give him injections in his shoulders as well.  Educated not to take any NSAIDs.  Easy bruising  Bruising on upper arms bilaterally, small skin tears noted.  This is most likely due to the Xarelto he is on.  He is to continue the Xarelto.  He was educated on risk of bleeding and told to report to the emergency room if he is bleeding and unable to stop it.       Need for immunization against influenza    Orders:    Fluzone High-Dose 65+yrs (1402-1692)               Follow Up        Follow Up   Return for With PCP, Next scheduled follow up, sooner if condition worsens.  Patient was given instructions and counseling regarding his condition or for health maintenance advice. Please see specific information pulled into the AVS if appropriate.

## 2024-11-06 NOTE — ASSESSMENT & PLAN NOTE
Orders:    meloxicam (Mobic) 15 MG tablet; Take 0.5 tablets by mouth Daily. 1/2 tab daily for 5 days then may increase to 1 tablet if needed

## 2024-11-12 ENCOUNTER — TELEPHONE (OUTPATIENT)
Dept: FAMILY MEDICINE CLINIC | Age: 74
End: 2024-11-12
Payer: MEDICARE

## 2024-11-12 DIAGNOSIS — J18.9 PNEUMONIA OF LEFT LOWER LOBE DUE TO INFECTIOUS ORGANISM: Primary | ICD-10-CM

## 2024-11-13 ENCOUNTER — OFFICE VISIT (OUTPATIENT)
Dept: FAMILY MEDICINE CLINIC | Age: 74
End: 2024-11-13
Payer: MEDICARE

## 2024-11-13 VITALS
SYSTOLIC BLOOD PRESSURE: 144 MMHG | TEMPERATURE: 97.9 F | OXYGEN SATURATION: 97 % | DIASTOLIC BLOOD PRESSURE: 83 MMHG | HEART RATE: 71 BPM | HEIGHT: 69 IN | WEIGHT: 195.6 LBS | BODY MASS INDEX: 28.97 KG/M2

## 2024-11-13 DIAGNOSIS — J30.9 ALLERGIC RHINITIS, UNSPECIFIED SEASONALITY, UNSPECIFIED TRIGGER: ICD-10-CM

## 2024-11-13 DIAGNOSIS — J40 BRONCHITIS: ICD-10-CM

## 2024-11-13 DIAGNOSIS — H69.93 EUSTACHIAN TUBE DYSFUNCTION, BILATERAL: Primary | ICD-10-CM

## 2024-11-13 PROCEDURE — 99214 OFFICE O/P EST MOD 30 MIN: CPT | Performed by: NURSE PRACTITIONER

## 2024-11-13 PROCEDURE — 1159F MED LIST DOCD IN RCRD: CPT | Performed by: NURSE PRACTITIONER

## 2024-11-13 PROCEDURE — 3079F DIAST BP 80-89 MM HG: CPT | Performed by: NURSE PRACTITIONER

## 2024-11-13 PROCEDURE — 1160F RVW MEDS BY RX/DR IN RCRD: CPT | Performed by: NURSE PRACTITIONER

## 2024-11-13 PROCEDURE — 3077F SYST BP >= 140 MM HG: CPT | Performed by: NURSE PRACTITIONER

## 2024-11-13 RX ORDER — LEVOCETIRIZINE DIHYDROCHLORIDE 5 MG/1
5 TABLET, FILM COATED ORAL DAILY
Qty: 90 TABLET | Refills: 1 | Status: SHIPPED | OUTPATIENT
Start: 2024-11-13

## 2024-11-13 RX ORDER — METHYLPREDNISOLONE 4 MG/1
TABLET ORAL
Qty: 21 TABLET | Refills: 0 | Status: SHIPPED | OUTPATIENT
Start: 2024-11-13

## 2024-11-13 RX ORDER — ALBUTEROL SULFATE 90 UG/1
2 INHALANT RESPIRATORY (INHALATION) EVERY 4 HOURS PRN
Qty: 18 G | Refills: 1 | Status: SHIPPED | OUTPATIENT
Start: 2024-11-13

## 2024-11-13 NOTE — PROGRESS NOTES
Chief Complaint  Cesario Dueñas presents to [facility] for Ear Drainage (Has been feeling off balance, ears feel wet, X 1 month ) and Wheezing (X 1 month )    Subjective          History of Present Illness    Bill is here today with c/o wheezing. Additionally, notes that his ears feel wet and has felt off balance for the past month. Not currently taking any antihistamines. He does take his montelukast daily. No longer taking allergy shots. He does have a nasal spray at home that he uses but does not know name.     Review of Systems      No Known Allergies   Past Medical History:   Diagnosis Date    Hypertension     Lumbosacral disc disease      Current Outpatient Medications   Medication Sig Dispense Refill    ALPRAZolam (XANAX) 0.5 MG tablet Take 1 tablet by mouth At Night As Needed.      atorvastatin (LIPITOR) 40 MG tablet Take 1 tablet by mouth Daily. 90 tablet 0    cholecalciferol (VITAMIN D3) 25 MCG (1000 UT) tablet Take 1 tablet by mouth As Needed.      colestipol (COLESTID) 1 g tablet Take 3 tablets by mouth Daily. 270 tablet 0    desvenlafaxine (PRISTIQ) 50 MG 24 hr tablet Take 1 tablet by mouth Daily.      gabapentin (NEURONTIN) 600 MG tablet Take 1 tablet by mouth 2 (two) times a day.      lisinopril (PRINIVIL,ZESTRIL) 20 MG tablet Take 1 tablet by mouth Daily. 90 tablet 0    meloxicam (Mobic) 15 MG tablet Take 0.5 tablets by mouth Daily. 1/2 tab daily for 5 days then may increase to 1 tablet if needed 30 tablet 1    montelukast (SINGULAIR) 10 MG tablet Take 1 tablet by mouth Every Night.      multivitamin with minerals tablet tablet Take 1 tablet by mouth Daily.      primidone (MYSOLINE) 250 MG tablet 1/2 tablet in the morning, 1 tablet at night. (Patient taking differently: 1/2 tablet in the morning, 1/2 tablet at night.) 135 tablet 3    rivaroxaban (XARELTO) 20 MG tablet Take 1 tablet by mouth Daily. 90 tablet 0    sildenafil (VIAGRA) 100 MG tablet Take 1 tablet by  mouth As Needed for Erectile Dysfunction. 30 tablet 0    traMADol HCl (ULTRAM) 100 MG tablet Take 1 tablet by mouth Every 8 (Eight) Hours As Needed (moderate pain). 30 tablet 0    albuterol sulfate  (90 Base) MCG/ACT inhaler Inhale 2 puffs Every 4 (Four) Hours As Needed for Wheezing. 18 g 1    levocetirizine (XYZAL) 5 MG tablet Take 1 tablet by mouth Daily. 90 tablet 1    methylPREDNISolone (Medrol) 4 MG tablet Take as directed for 5 days 21 tablet 0     No current facility-administered medications for this visit.     History reviewed. No pertinent surgical history.   Social History     Tobacco Use    Smoking status: Former     Current packs/day: 0.00     Average packs/day: 2.0 packs/day for 20.0 years (40.0 ttl pk-yrs)     Types: Cigarettes     Start date:      Quit date: 2000     Years since quittin.8     Passive exposure: Past    Smokeless tobacco: Never   Vaping Use    Vaping status: Never Used   Substance Use Topics    Alcohol use: Yes     Comment: maggi daily    Drug use: Never     Family History   Problem Relation Age of Onset    Cancer Mother     Heart attack Father      There are no preventive care reminders to display for this patient.   Immunization History   Administered Date(s) Administered    COVID-19 (MODERNA) 1st,2nd,3rd Dose Monovalent 2021, 2021    COVID-19 (PFIZER) Purple Cap Monovalent 12/10/2021    FLUAD TRI 65YR+ 10/17/2018, 10/21/2019, 10/09/2020    Fluzone (or Fluarix & Flulaval for VFC) >6mos 2017    Fluzone High-Dose 65+YRS 2024    Fluzone High-Dose 65+yrs 2021, 10/21/2022, 10/24/2023    Fluzone Quad >6mos (Multi-dose) 10/09/2020    Hep A, 2 Dose 2018    Hepatitis A 2018, 2018    Pneumococcal Conjugate 13-Valent (PCV13) 10/21/2015    Pneumococcal Polysaccharide (PPSV23) 2015    Td (TDVAX) 2012    Tdap 2024        Objective     Vitals:    24 1145 24 1150   BP: 161/88 144/83   Pulse: 72 71   Temp:  "97.9 °F (36.6 °C)    TempSrc: Oral    SpO2: 97%    Weight: 88.7 kg (195 lb 9.6 oz)    Height: 175.3 cm (69\")      Body mass index is 28.89 kg/m².                No results found.    Physical Exam  Vitals reviewed.   Constitutional:       General: He is not in acute distress.     Appearance: Normal appearance. He is well-developed.   HENT:      Head: Normocephalic and atraumatic.      Right Ear: Ear canal normal. A middle ear effusion is present.      Left Ear: Ear canal normal. A middle ear effusion is present.      Mouth/Throat:      Mouth: Mucous membranes are moist.   Eyes:      Extraocular Movements: Extraocular movements intact.      Pupils: Pupils are equal, round, and reactive to light.   Cardiovascular:      Rate and Rhythm: Normal rate and regular rhythm.   Pulmonary:      Effort: Pulmonary effort is normal.      Breath sounds: Normal breath sounds.   Neurological:      Mental Status: He is alert and oriented to person, place, and time.   Psychiatric:         Mood and Affect: Mood and affect normal.           Result Review :                               Assessment and Plan      Assessment & Plan  Eustachian tube dysfunction, bilateral  Symptoms likely due to fluid behind ear drums. Recommend that he restart daily antihistamine. Can also use steroid spray as long as its not afrin. Will additionally, prescribe oral steroid course which will hopefully help with eustachian tube dysfunction as well as wheezing. Has used albuterol inhaler in the past as well with improvement for wheezing. Will refill this today. No acute worrisome findings on exam today.        Allergic rhinitis, unspecified seasonality, unspecified trigger    Orders:    levocetirizine (XYZAL) 5 MG tablet; Take 1 tablet by mouth Daily.    Bronchitis    Orders:    methylPREDNISolone (Medrol) 4 MG tablet; Take as directed for 5 days    albuterol sulfate  (90 Base) MCG/ACT inhaler; Inhale 2 puffs Every 4 (Four) Hours As Needed for " Wheezing.              Follow Up     Return for Next scheduled follow up, Or sooner as needed.

## 2024-12-11 ENCOUNTER — TELEPHONE (OUTPATIENT)
Dept: FAMILY MEDICINE CLINIC | Age: 74
End: 2024-12-11
Payer: MEDICARE

## 2024-12-13 ENCOUNTER — HOSPITAL ENCOUNTER (OUTPATIENT)
Dept: GENERAL RADIOLOGY | Facility: HOSPITAL | Age: 74
Discharge: HOME | End: 2024-12-13
Payer: MEDICARE

## 2024-12-13 DIAGNOSIS — J18.9 PNEUMONIA OF LEFT LOWER LOBE DUE TO INFECTIOUS ORGANISM: ICD-10-CM

## 2024-12-13 PROCEDURE — 71046 X-RAY EXAM CHEST 2 VIEWS: CPT

## 2024-12-30 ENCOUNTER — TELEPHONE (OUTPATIENT)
Dept: FAMILY MEDICINE CLINIC | Age: 74
End: 2024-12-30
Payer: MEDICARE

## 2024-12-30 NOTE — TELEPHONE ENCOUNTER
Looks like he canceled his appt last week. Was planning on repeating his venous doppler to follow up on DVT. Is he ready to schedule?

## 2024-12-30 NOTE — TELEPHONE ENCOUNTER
Pt states he is having water issues at his home right now, so he will call office back when he is ready to schedule.

## 2025-03-04 ENCOUNTER — OFFICE VISIT (OUTPATIENT)
Dept: FAMILY MEDICINE CLINIC | Age: 75
End: 2025-03-04
Payer: MEDICARE

## 2025-03-04 VITALS
TEMPERATURE: 98 F | WEIGHT: 189.4 LBS | SYSTOLIC BLOOD PRESSURE: 139 MMHG | DIASTOLIC BLOOD PRESSURE: 85 MMHG | HEART RATE: 70 BPM | OXYGEN SATURATION: 98 % | HEIGHT: 69 IN | BODY MASS INDEX: 28.05 KG/M2

## 2025-03-04 DIAGNOSIS — I82.412 ACUTE DEEP VEIN THROMBOSIS (DVT) OF FEMORAL VEIN OF LEFT LOWER EXTREMITY: Primary | ICD-10-CM

## 2025-03-04 DIAGNOSIS — M25.511 BILATERAL SHOULDER PAIN, UNSPECIFIED CHRONICITY: ICD-10-CM

## 2025-03-04 DIAGNOSIS — G25.0 BENIGN ESSENTIAL TREMOR: ICD-10-CM

## 2025-03-04 DIAGNOSIS — M54.6 BILATERAL THORACIC BACK PAIN, UNSPECIFIED CHRONICITY: ICD-10-CM

## 2025-03-04 DIAGNOSIS — F10.10 ALCOHOL ABUSE: ICD-10-CM

## 2025-03-04 DIAGNOSIS — M25.512 BILATERAL SHOULDER PAIN, UNSPECIFIED CHRONICITY: ICD-10-CM

## 2025-03-04 PROCEDURE — 3079F DIAST BP 80-89 MM HG: CPT | Performed by: NURSE PRACTITIONER

## 2025-03-04 PROCEDURE — 3075F SYST BP GE 130 - 139MM HG: CPT | Performed by: NURSE PRACTITIONER

## 2025-03-04 PROCEDURE — 99214 OFFICE O/P EST MOD 30 MIN: CPT | Performed by: NURSE PRACTITIONER

## 2025-03-04 PROCEDURE — 1160F RVW MEDS BY RX/DR IN RCRD: CPT | Performed by: NURSE PRACTITIONER

## 2025-03-04 PROCEDURE — 1159F MED LIST DOCD IN RCRD: CPT | Performed by: NURSE PRACTITIONER

## 2025-03-04 NOTE — ASSESSMENT & PLAN NOTE
Will continue primidone as prescribed by neurology.  He has noted improvement since decreasing alcohol intake.

## 2025-03-04 NOTE — PROGRESS NOTES
Chief Complaint  Cesario Dueñas presents to [facility] for Shaking and Fatigue (Trouble walking, standing)    Subjective          History of Present Illness    Bill reports that he originally made this appointment for complaints of difficulty walking and shaking.  He notes that he is cut back on his alcohol intake about 7 to 8 days ago and has noticed improvement in his symptoms.  Reports previously drinking to 10 out with ice whiskeys per day.  Denies need for addiction medication referral at this time.  Reports balance has improved.  He continues on primidone for tremors in bilateral hands that was ordered by neurology.  C/o mid back pain. Previously saw neurosurgery for Cspine and Lspine pain. Reports neck pain and low back pain improved.  Additionally, c/o bilateral shoulder pain. Reports was going to be seeing ortho but had to cancel.   Currently on Xarelto for LLE DVT. Venous doppler repeat on 9/26/24 with persistent but improved appearance of thrombus within the proximal aspect of the left lower extremity.  Plan was to repeat venous Doppler after visit on 12/27/2024 but this was canceled.  He would like to schedule follow-up venous Doppler at this time.    Review of Systems      No Known Allergies   Past Medical History:   Diagnosis Date    Hypertension     Lumbosacral disc disease      Current Outpatient Medications   Medication Sig Dispense Refill    albuterol sulfate  (90 Base) MCG/ACT inhaler Inhale 2 puffs Every 4 (Four) Hours As Needed for Wheezing. 18 g 1    ALPRAZolam (XANAX) 0.5 MG tablet Take 1 tablet by mouth At Night As Needed.      atorvastatin (LIPITOR) 40 MG tablet Take 1 tablet by mouth Daily. 90 tablet 0    desvenlafaxine (PRISTIQ) 50 MG 24 hr tablet Take 1 tablet by mouth Daily.      gabapentin (NEURONTIN) 600 MG tablet Take 1 tablet by mouth 2 (two) times a day.      levocetirizine (XYZAL) 5 MG tablet Take 1 tablet by mouth Daily. 90 tablet 1     lisinopril (PRINIVIL,ZESTRIL) 20 MG tablet Take 1 tablet by mouth Daily. 90 tablet 0    montelukast (SINGULAIR) 10 MG tablet Take 1 tablet by mouth Every Night.      multivitamin with minerals tablet tablet Take 1 tablet by mouth Daily.      primidone (MYSOLINE) 250 MG tablet 1/2 tablet in the morning, 1 tablet at night. (Patient taking differently: 1/2 tablet in the morning, 1/2 tablet at night.) 135 tablet 3    rivaroxaban (XARELTO) 20 MG tablet Take 1 tablet by mouth Daily. 90 tablet 0    cholecalciferol (VITAMIN D3) 25 MCG (1000 UT) tablet Take 1 tablet by mouth As Needed.      colestipol (COLESTID) 1 g tablet Take 3 tablets by mouth Daily. 270 tablet 0     No current facility-administered medications for this visit.     History reviewed. No pertinent surgical history.   Social History     Tobacco Use    Smoking status: Former     Current packs/day: 0.00     Average packs/day: 2.0 packs/day for 20.0 years (40.0 ttl pk-yrs)     Types: Cigarettes     Start date:      Quit date:      Years since quittin.1     Passive exposure: Past    Smokeless tobacco: Never   Vaping Use    Vaping status: Never Used   Substance Use Topics    Alcohol use: Yes     Comment: jessicajoel daily    Drug use: Never     Family History   Problem Relation Age of Onset    Cancer Mother     Heart attack Father      Health Maintenance Due   Topic Date Due    LIPID PANEL  2025      Immunization History   Administered Date(s) Administered    COVID-19 (MODERNA) 1st,2nd,3rd Dose Monovalent 2021, 2021    COVID-19 (PFIZER) Purple Cap Monovalent 12/10/2021    FLUAD TRI 65YR+ 10/17/2018, 10/21/2019, 10/09/2020    Fluzone (or Fluarix & Flulaval for VFC) >6mos 2017    Fluzone High-Dose 65+YRS 2024    Fluzone High-Dose 65+yrs 2021, 10/21/2022, 10/24/2023    Fluzone Quad >6mos (Multi-dose) 10/09/2020    Hep A, 2 Dose 2018    Hepatitis A 2018, 2018    Pneumococcal Conjugate 13-Valent (PCV13)  "10/21/2015    Pneumococcal Polysaccharide (PPSV23) 12/31/2015    Td (TDVAX) 08/03/2012    Tdap 04/07/2024        Objective     Vitals:    03/04/25 1144   BP: 139/85   Pulse: 70   Temp: 98 °F (36.7 °C)   TempSrc: Oral   SpO2: 98%   Weight: 85.9 kg (189 lb 6.4 oz)   Height: 175.3 cm (69\")     Body mass index is 27.97 kg/m².                No results found.    Physical Exam  Vitals reviewed.   Constitutional:       General: He is not in acute distress.     Appearance: Normal appearance. He is well-developed.   HENT:      Head: Normocephalic and atraumatic.   Cardiovascular:      Rate and Rhythm: Normal rate and regular rhythm.   Pulmonary:      Effort: Pulmonary effort is normal.      Breath sounds: Normal breath sounds.   Musculoskeletal:      Right shoulder: Decreased range of motion.      Left shoulder: Decreased range of motion.      Thoracic back: Tenderness present. Decreased range of motion.   Neurological:      Mental Status: He is alert and oriented to person, place, and time.   Psychiatric:         Mood and Affect: Mood and affect normal.           Result Review :                               Assessment and Plan      Assessment & Plan  Acute deep vein thrombosis (DVT) of femoral vein of left lower extremity  Repeating venous Doppler to follow-up on DVT.  Will continue Xarelto at this time.  Orders:    US Venous Doppler Lower Extremity Left (duplex); Future    Bilateral thoracic back pain, unspecified chronicity  Mid back x-ray.  Discussed consideration for physical therapy.  Orders:    XR Spine Thoracic 3 View; Future    Bilateral shoulder pain, unspecified chronicity  Bilateral shoulder x-ray.  Consider Ortho referral.  Orders:    XR Shoulder 2+ View Left; Future    XR Shoulder 2+ View Right; Future    Benign essential tremor  Will continue primidone as prescribed by neurology.  He has noted improvement since decreasing alcohol intake.       Alcohol abuse  Reports improvement in symptoms since cutting back " on alcohol.  Declines addiction medicine referral at this time.                 Follow Up     No follow-ups on file.

## 2025-03-04 NOTE — ASSESSMENT & PLAN NOTE
Repeating venous Doppler to follow-up on DVT.  Will continue Xarelto at this time.  Orders:    US Venous Doppler Lower Extremity Left (duplex); Future

## 2025-03-19 ENCOUNTER — RESULTS FOLLOW-UP (OUTPATIENT)
Dept: ULTRASOUND IMAGING | Facility: HOSPITAL | Age: 75
End: 2025-03-19
Payer: MEDICARE

## 2025-03-19 ENCOUNTER — HOSPITAL ENCOUNTER (OUTPATIENT)
Dept: ULTRASOUND IMAGING | Facility: HOSPITAL | Age: 75
Discharge: HOME | End: 2025-03-19
Admitting: NURSE PRACTITIONER
Payer: MEDICARE

## 2025-03-19 DIAGNOSIS — I82.412 ACUTE DEEP VEIN THROMBOSIS (DVT) OF FEMORAL VEIN OF LEFT LOWER EXTREMITY: ICD-10-CM

## 2025-03-19 PROCEDURE — 93971 EXTREMITY STUDY: CPT

## 2025-03-24 ENCOUNTER — TELEPHONE (OUTPATIENT)
Dept: FAMILY MEDICINE CLINIC | Age: 75
End: 2025-03-24
Payer: MEDICARE

## 2025-03-27 ENCOUNTER — HOSPITAL ENCOUNTER (OUTPATIENT)
Dept: GENERAL RADIOLOGY | Facility: HOSPITAL | Age: 75
Discharge: HOME | End: 2025-03-27
Admitting: NURSE PRACTITIONER
Payer: MEDICARE

## 2025-03-27 DIAGNOSIS — M25.511 BILATERAL SHOULDER PAIN, UNSPECIFIED CHRONICITY: ICD-10-CM

## 2025-03-27 DIAGNOSIS — M25.512 BILATERAL SHOULDER PAIN, UNSPECIFIED CHRONICITY: ICD-10-CM

## 2025-03-27 DIAGNOSIS — M54.6 BILATERAL THORACIC BACK PAIN, UNSPECIFIED CHRONICITY: ICD-10-CM

## 2025-03-27 PROCEDURE — 73030 X-RAY EXAM OF SHOULDER: CPT

## 2025-03-27 PROCEDURE — 72072 X-RAY EXAM THORAC SPINE 3VWS: CPT

## 2025-04-01 DIAGNOSIS — M25.511 BILATERAL SHOULDER PAIN, UNSPECIFIED CHRONICITY: Primary | ICD-10-CM

## 2025-04-01 DIAGNOSIS — M19.012 PRIMARY OSTEOARTHRITIS OF BOTH SHOULDERS: ICD-10-CM

## 2025-04-01 DIAGNOSIS — M19.011 PRIMARY OSTEOARTHRITIS OF BOTH SHOULDERS: ICD-10-CM

## 2025-04-01 DIAGNOSIS — M25.512 BILATERAL SHOULDER PAIN, UNSPECIFIED CHRONICITY: Primary | ICD-10-CM

## 2025-04-14 ENCOUNTER — OFFICE VISIT (OUTPATIENT)
Dept: ORTHOPEDIC SURGERY | Facility: CLINIC | Age: 75
End: 2025-04-14
Payer: MEDICARE

## 2025-04-14 VITALS
HEIGHT: 69 IN | SYSTOLIC BLOOD PRESSURE: 134 MMHG | HEART RATE: 69 BPM | WEIGHT: 192 LBS | OXYGEN SATURATION: 97 % | BODY MASS INDEX: 28.44 KG/M2 | DIASTOLIC BLOOD PRESSURE: 86 MMHG

## 2025-04-14 DIAGNOSIS — M12.812 ROTATOR CUFF ARTHROPATHY OF BOTH SHOULDERS: Primary | ICD-10-CM

## 2025-04-14 DIAGNOSIS — M12.811 ROTATOR CUFF ARTHROPATHY OF BOTH SHOULDERS: Primary | ICD-10-CM

## 2025-04-14 RX ORDER — TRIAMCINOLONE ACETONIDE 40 MG/ML
40 INJECTION, SUSPENSION INTRA-ARTICULAR; INTRAMUSCULAR
Status: COMPLETED | OUTPATIENT
Start: 2025-04-14 | End: 2025-04-14

## 2025-04-14 RX ORDER — LIDOCAINE HYDROCHLORIDE 10 MG/ML
5 INJECTION, SOLUTION INFILTRATION; PERINEURAL
Status: COMPLETED | OUTPATIENT
Start: 2025-04-14 | End: 2025-04-14

## 2025-04-14 RX ADMIN — LIDOCAINE HYDROCHLORIDE 5 ML: 10 INJECTION, SOLUTION INFILTRATION; PERINEURAL at 11:34

## 2025-04-14 RX ADMIN — TRIAMCINOLONE ACETONIDE 40 MG: 40 INJECTION, SUSPENSION INTRA-ARTICULAR; INTRAMUSCULAR at 11:34

## 2025-04-14 NOTE — PROGRESS NOTES
"Chief Complaint  Pain and Initial Evaluation of the Left Shoulder and Pain and Initial Evaluation of the Right Shoulder    Subjective          Cesario Dueñas presents to [facility] for an evaluation  of bilateral shoulder.     History of Present Illness    The patient presents here today for an evaluation  of his shoulders. His shoulders have been bothering him for several months but denies any specific injury, trauma, falls or prior surgery to his shoulders. He locates his pain to the anterior  lateral  aspect of his shoulders. He recently went to his family doctor where he had bilateral shoulder x-rays. He has decrease in strength and range of motion to his shoulders.     No Known Allergies     Social History     Socioeconomic History    Marital status:    Tobacco Use    Smoking status: Former     Current packs/day: 0.00     Average packs/day: 2.0 packs/day for 20.0 years (40.0 ttl pk-yrs)     Types: Cigarettes     Start date:      Quit date:      Years since quittin.3     Passive exposure: Past    Smokeless tobacco: Never   Vaping Use    Vaping status: Never Used   Substance and Sexual Activity    Alcohol use: Yes     Comment: maggi daily    Drug use: Never    Sexual activity: Defer        I reviewed the patient's chief complaint, history of present illness, review of systems, past medical history, surgical history, family history, social history, medications, and allergy list.     REVIEW OF SYSTEMS    Constitutional: Denies fevers, chills, weight loss  Cardiovascular: Denies chest pain, shortness of breath  Skin: Denies rashes, acute skin changes  Neurologic: Denies headache, loss of consciousness  MSK: bilateral shoulder pain       Objective   Vital Signs:   /86   Pulse 69   Ht 175.3 cm (69\")   Wt 87.1 kg (192 lb)   SpO2 97%   BMI 28.35 kg/m²     Body mass index is 28.35 kg/m².    Physical Exam    General: Alert. No acute distress.   Bilateral " upper extremity: Forward elevation  to 60 degrees with pain, external rotation  at the side to 30 degrees, internal rotation to waistline, elbow drops with subscap, 3/5 supraspinatus strength and infraspinatus, positive  impingement, neurovascularly intact, sensation intact to the medial, radial and ulnar nerve, positive  pulses.     Right shoulder injeciton  Date/Time: 4/14/2025 11:34 AM  Consent given by: patient  Site marked: site marked  Timeout: Immediately prior to procedure a time out was called to verify the correct patient, procedure, equipment, support staff and site/side marked as required   Supporting Documentation  Indications: pain   Procedure Details  Location: shoulder (right) -   Needle gauge: 21g.  Medications administered: 5 mL lidocaine 1 %; 40 mg triamcinolone acetonide 40 MG/ML  Patient tolerance: patient tolerated the procedure well with no immediate complications      Left shoulder injection  Date/Time: 4/14/2025 11:34 AM  Consent given by: patient  Site marked: site marked  Supporting Documentation  Indications: pain   Procedure Details  Location: shoulder (left) -   Needle gauge: 21g.  Medications administered: 5 mL lidocaine 1 %; 40 mg triamcinolone acetonide 40 MG/ML  Patient tolerance: patient tolerated the procedure well with no immediate complications    This injection documentation was Scribed for Mahesh James MD by Cindy Henriquez MA.  04/14/25   11:35 EDT    Imaging Results (Most Recent)       None                     Assessment and Plan        XR Spine Thoracic 3 View  Result Date: 3/31/2025  Narrative: XR SPINE THORACIC 3 VW Date of Exam: 3/27/2025 12:45 PM EDT Indication: mid back pain Comparison: Two-view chest 12/13/2024 and prior Findings: 12 rib-bearing vertebral bodies are noted. Mild anterior wedging T11 and T12 noted similar to prior MRI from 8/30/2024 the lumbar spine. Moderate to advanced degenerative changes of the mid and lower thoracic spine again appreciated  similar to comparison. Limited imaging of the visualized chest demonstrates no acute abnormality     Impression: Impression: Moderate to advanced multilevel degenerative changes of the mid and lower thoracic spine. Remote compression deformities versus congenital wedging lower thoracic vertebral bodies Electronically Signed: Marcel Beltran MD  3/31/2025 2:44 PM EDT  Workstation ID: [id number]    XR Shoulder 2+ View Right  Result Date: 3/31/2025  Narrative: XR SHOULDER 2+ VW LEFT, XR SHOULDER 2+ VW RIGHT Date of Exam: 3/27/2025 12:45 PM EDT Indication: left shoulder pain right shoulder pain. Comparison: 8/3/2022 Findings: Left shoulder: No acute fracture or dislocation is noted. Moderate degenerative changes are noted at the AC joint. There is mild spurring along the undersurface of the acromion. Mild degenerative changes are appreciated at the glenohumeral joint. Limited  imaging of the visualized chest is grossly unremarkable. Degenerative changes are mildly increased compared to the 2022 comparison. Right shoulder: Moderate degenerative changes are noted at the AC joint. There is spurring along the undersurface of the acromion and calcification adjacent to the acromion compatible with possible calcific tendinitis. Limited imaging of the visualized chest is grossly unremarkable     Impression: Impression: 1.Moderate degenerative changes at the A.C. Joints bilaterally. 2.Mild degenerative changes at the glenohumeral joint on the left. Electronically Signed: Marcel Beltran MD  3/31/2025 2:39 PM EDT  Workstation ID: [id number]    XR Shoulder 2+ View Left  Result Date: 3/31/2025  Narrative: XR SHOULDER 2+ VW LEFT, XR SHOULDER 2+ VW RIGHT Date of Exam: 3/27/2025 12:45 PM EDT Indication: left shoulder pain right shoulder pain. Comparison: 8/3/2022 Findings: Left shoulder: No acute fracture or dislocation is noted. Moderate degenerative changes are noted at the AC joint. There is mild spurring along the undersurface of  the acromion. Mild degenerative changes are appreciated at the glenohumeral joint. Limited  imaging of the visualized chest is grossly unremarkable. Degenerative changes are mildly increased compared to the 2022 comparison. Right shoulder: Moderate degenerative changes are noted at the AC joint. There is spurring along the undersurface of the acromion and calcification adjacent to the acromion compatible with possible calcific tendinitis. Limited imaging of the visualized chest is grossly unremarkable     Impression: Impression: 1.Moderate degenerative changes at the A.C. Joints bilaterally. 2.Mild degenerative changes at the glenohumeral joint on the left. Electronically Signed: Marcel Beltran MD  3/31/2025 2:39 PM EDT  Workstation ID: [id number]    US Venous Doppler Lower Extremity Left (duplex)  Result Date: 3/19/2025  Narrative: US VENOUS DOPPLER LOWER EXTREMITY LEFT (DUPLEX) Date of Exam: 3/19/2025 11:35 AM EDT Indication: Follow up LLE DVT. Comparison: 9/26/2024 Technique:  Routine gray scale, color flow and spectral Doppler analysis of the left lower extremity. A complete venous study was performed with image documentation obtained per protocol. Findings: Within the visualized portions, there is appropriate flow, augmentation of flow and compressibility demonstrated. There is no evidence of left lower extremity acute or subacute DVT. There is some residual thickening of the wall of the vein in the femoral  segments consistent with chronic post DVT changes/scarring.     Impression: Impression: 1.No evidence of acute or subacute DVT within the left lower extremity. Previously seen thrombus has resolved. Electronically Signed: Clive Garnett MD  3/19/2025 12:17 PM EDT  Workstation ID: [id number]       Diagnoses and all orders for this visit:    1. Rotator cuff arthropathy of both shoulders (Primary)        The patient presents here today for an evaluation  of bilateral shoulder.     Discussed the treatment plan with  the patient, operative vs non-operative.     Patient wishes to proceed with conservative treatment options.     Discussed the risks and benefits of bilateral shoulder steroid injections today in the office. Patient expressed understanding and wishes to proceed.     Home exercises given today and will continue current medications for pain control.     Call or return if worsening symptoms.    Scribed for Mahesh James MD by Fela Jean  04/14/2025   11:17 EDT     Follow Up       3 months     Patient was given instructions and counseling regarding his condition or for health maintenance advice. Please see specific information pulled into the AVS if appropriate.     I have personally performed the services described in this document as scribed by the above individual and it is both accurate and complete. Mahesh James MD 04/14/25 11:38 EDT

## 2025-06-04 ENCOUNTER — OFFICE VISIT (OUTPATIENT)
Dept: FAMILY MEDICINE CLINIC | Age: 75
End: 2025-06-04
Payer: MEDICARE

## 2025-06-04 VITALS
DIASTOLIC BLOOD PRESSURE: 75 MMHG | WEIGHT: 193.6 LBS | HEART RATE: 81 BPM | TEMPERATURE: 97.9 F | OXYGEN SATURATION: 99 % | HEIGHT: 69 IN | BODY MASS INDEX: 28.68 KG/M2 | SYSTOLIC BLOOD PRESSURE: 112 MMHG

## 2025-06-04 DIAGNOSIS — E55.9 VITAMIN D DEFICIENCY: ICD-10-CM

## 2025-06-04 DIAGNOSIS — I10 PRIMARY HYPERTENSION: Primary | ICD-10-CM

## 2025-06-04 DIAGNOSIS — R53.83 OTHER FATIGUE: ICD-10-CM

## 2025-06-04 DIAGNOSIS — M48.061 SPINAL STENOSIS OF LUMBAR REGION WITHOUT NEUROGENIC CLAUDICATION: ICD-10-CM

## 2025-06-04 DIAGNOSIS — E78.2 MIXED HYPERLIPIDEMIA: ICD-10-CM

## 2025-06-04 PROBLEM — Z86.718 HISTORY OF DVT OF LOWER EXTREMITY: Status: ACTIVE | Noted: 2025-06-04

## 2025-06-04 PROBLEM — I82.412 ACUTE DEEP VEIN THROMBOSIS (DVT) OF FEMORAL VEIN OF LEFT LOWER EXTREMITY: Status: RESOLVED | Noted: 2024-07-10 | Resolved: 2025-06-04

## 2025-06-04 RX ORDER — ATORVASTATIN CALCIUM 40 MG/1
40 TABLET, FILM COATED ORAL DAILY
Qty: 90 TABLET | Refills: 1 | Status: SHIPPED | OUTPATIENT
Start: 2025-06-04

## 2025-06-04 RX ORDER — LISINOPRIL 20 MG/1
20 TABLET ORAL DAILY
Qty: 90 TABLET | Refills: 1 | Status: SHIPPED | OUTPATIENT
Start: 2025-06-04

## 2025-06-04 NOTE — PROGRESS NOTES
Chief Complaint  Cesario Dueñas presents to [facility] for Fatigue and Back Pain    Subjective          History of Present Illness    Deshawn is here today for chronic visit follow up. He is on atorvastatin for hyperlipidemia and lisinopril for HTN.   He has seen neurology for benign essential tremors. He is on primidone.   He sees Dr Clive Singh psychiatry for depression/anxiety. He is on pristiq, gabapentin, and xanax. He saw him yesterday and had his gabapentin dose lowered due to fatigue complaints.   C/o low back pain. Imaging moderate central canal stenosis and severe bilateral foraminal narrowing at L4-L5. Saw neurosurgery Did not feel surgery would help. Recommended considering PT vs pain management for spinal injections. He would like to proceed with physical therapy.     Review of Systems      No Known Allergies   Past Medical History:   Diagnosis Date    Hypertension     Lumbosacral disc disease      Current Outpatient Medications   Medication Sig Dispense Refill    albuterol sulfate  (90 Base) MCG/ACT inhaler Inhale 2 puffs Every 4 (Four) Hours As Needed for Wheezing. 18 g 1    ALPRAZolam (XANAX) 0.5 MG tablet Take 1 tablet by mouth At Night As Needed.      atorvastatin (LIPITOR) 40 MG tablet Take 1 tablet by mouth Daily. 90 tablet 1    cholecalciferol (VITAMIN D3) 25 MCG (1000 UT) tablet Take 1 tablet by mouth As Needed.      colestipol (COLESTID) 1 g tablet Take 3 tablets by mouth Daily. 270 tablet 0    desvenlafaxine (PRISTIQ) 50 MG 24 hr tablet Take 1 tablet by mouth Daily.      gabapentin (NEURONTIN) 600 MG tablet Take 1 tablet by mouth Daily.      levocetirizine (XYZAL) 5 MG tablet Take 1 tablet by mouth Daily. 90 tablet 1    lisinopril (PRINIVIL,ZESTRIL) 20 MG tablet Take 1 tablet by mouth Daily. 90 tablet 1    montelukast (SINGULAIR) 10 MG tablet Take 1 tablet by mouth Every Night.      multivitamin with minerals tablet tablet Take 1 tablet by mouth Daily.    "   primidone (MYSOLINE) 250 MG tablet 1/2 tablet in the morning, 1 tablet at night. (Patient taking differently: 1/2 tablet in the morning, 1/2 tablet at night.) 135 tablet 3     No current facility-administered medications for this visit.     History reviewed. No pertinent surgical history.   Social History     Tobacco Use    Smoking status: Former     Current packs/day: 0.00     Average packs/day: 2.0 packs/day for 20.0 years (40.0 ttl pk-yrs)     Types: Cigarettes     Start date:      Quit date: 2000     Years since quittin.4     Passive exposure: Past    Smokeless tobacco: Never   Vaping Use    Vaping status: Never Used   Substance Use Topics    Alcohol use: Yes     Comment: maggi daily    Drug use: Never     Family History   Problem Relation Age of Onset    Cancer Mother     Heart attack Father      Health Maintenance Due   Topic Date Due    LIPID PANEL  2025    ANNUAL WELLNESS VISIT  07/10/2025      Immunization History   Administered Date(s) Administered    COVID-19 (MODERNA) 1st,2nd,3rd Dose Monovalent 2021, 2021    COVID-19 (PFIZER) Purple Cap Monovalent 12/10/2021    FLUAD TRI 65YR+ 10/17/2018, 10/21/2019, 10/09/2020    Fluzone (or Fluarix & Flulaval for VFC) >6mos 2017    Fluzone High-Dose 65+YRS 2024    Fluzone High-Dose 65+yrs 2021, 10/21/2022, 10/24/2023    Fluzone Quad >6mos (Multi-dose) 10/09/2020    Hep A, 2 Dose 2018    Hepatitis A 2018, 2018    Pneumococcal Conjugate 13-Valent (PCV13) 10/21/2015    Pneumococcal Polysaccharide (PPSV23) 2015    Td (TDVAX) 2012    Tdap 2024        Objective     Vitals:    25 1303   BP: 112/75   Pulse: 81   Temp: 97.9 °F (36.6 °C)   TempSrc: Oral   SpO2: 99%   Weight: 87.8 kg (193 lb 9.6 oz)   Height: 175.3 cm (69\")     Body mass index is 28.59 kg/m².              No results found.    Physical Exam  Vitals reviewed.   Constitutional:       General: He is not in acute distress.     " Appearance: Normal appearance. He is well-developed.   HENT:      Head: Normocephalic and atraumatic.   Cardiovascular:      Rate and Rhythm: Normal rate and regular rhythm.   Pulmonary:      Effort: Pulmonary effort is normal.      Breath sounds: Normal breath sounds.   Neurological:      Mental Status: He is alert and oriented to person, place, and time.   Psychiatric:         Mood and Affect: Mood and affect normal.           Result Review :                               Assessment and Plan      Assessment & Plan  Primary hypertension  Hypertension is stable and controlled  Continue current treatment regimen.  Blood pressure will be reassessed in 6 months.    Orders:    lisinopril (PRINIVIL,ZESTRIL) 20 MG tablet; Take 1 tablet by mouth Daily.    Mixed hyperlipidemia     Medical condition is stable.  Continue same therapy.  Will recheck at next regular appointment    Orders:    atorvastatin (LIPITOR) 40 MG tablet; Take 1 tablet by mouth Daily.    Comprehensive metabolic panel; Future    Lipid panel; Future    Vitamin D deficiency  Rechecking Vitamin D lab  Orders:    Vitamin D 25 hydroxy; Future    Other fatigue  Checking lab.  He will also reduce gabapentin dose as per psychiatry recommendations.  Consider sleep specialist referral if labs normal and no improvement with gabapentin dose adjustment.  Orders:    CBC w AUTO Differential; Future    TSH; Future    Vitamin B12; Future    Spinal stenosis of lumbar region without neurogenic claudication  Will get him set up with physical therapy for further evaluation.  Orders:    Ambulatory Referral to Physical Therapy for Evaluation & Treatment              Follow Up     Return in about 6 months (around 12/4/2025).

## 2025-06-04 NOTE — ASSESSMENT & PLAN NOTE
Will get him set up with physical therapy for further evaluation.  Orders:    Ambulatory Referral to Physical Therapy for Evaluation & Treatment

## 2025-06-04 NOTE — ASSESSMENT & PLAN NOTE
Hypertension is stable and controlled  Continue current treatment regimen.  Blood pressure will be reassessed in 6 months.    Orders:    lisinopril (PRINIVIL,ZESTRIL) 20 MG tablet; Take 1 tablet by mouth Daily.

## 2025-06-04 NOTE — ASSESSMENT & PLAN NOTE
Medical condition is stable.  Continue same therapy.  Will recheck at next regular appointment    Orders:    atorvastatin (LIPITOR) 40 MG tablet; Take 1 tablet by mouth Daily.    Comprehensive metabolic panel; Future    Lipid panel; Future

## 2025-06-09 ENCOUNTER — TELEPHONE (OUTPATIENT)
Dept: FAMILY MEDICINE CLINIC | Age: 75
End: 2025-06-09
Payer: MEDICARE

## 2025-06-09 NOTE — TELEPHONE ENCOUNTER
Pt states you had discussed getting shots with him vs. Physical therapy and at the time he wanted to try PT. He is calling back to say he would rather proceed with getting the shots and does not want to do PT. Please advise.

## 2025-06-10 DIAGNOSIS — M48.061 SPINAL STENOSIS OF LUMBAR REGION WITHOUT NEUROGENIC CLAUDICATION: Primary | ICD-10-CM

## 2025-06-10 NOTE — TELEPHONE ENCOUNTER
Pt states he is ok with going back to Counts include [facility] beds at the [facility] in [place]

## 2025-06-11 ENCOUNTER — LAB (OUTPATIENT)
Dept: LAB | Facility: HOSPITAL | Age: 75
End: 2025-06-11
Payer: MEDICARE

## 2025-06-11 DIAGNOSIS — E55.9 VITAMIN D DEFICIENCY: ICD-10-CM

## 2025-06-11 DIAGNOSIS — E78.2 MIXED HYPERLIPIDEMIA: ICD-10-CM

## 2025-06-11 DIAGNOSIS — R53.83 OTHER FATIGUE: ICD-10-CM

## 2025-06-11 LAB
25(OH)D3 SERPL-MCNC: 24.7 NG/ML (ref 30–100)
ALBUMIN SERPL-MCNC: 4.2 G/DL (ref 3.5–5.2)
ALBUMIN/GLOB SERPL: 1.6 G/DL
ALP SERPL-CCNC: 96 U/L (ref 39–117)
ALT SERPL W P-5'-P-CCNC: 17 U/L (ref 1–41)
ANION GAP SERPL CALCULATED.3IONS-SCNC: 9.2 MMOL/L (ref 5–15)
AST SERPL-CCNC: 22 U/L (ref 1–40)
BASOPHILS # BLD AUTO: 0.04 10*3/MM3 (ref 0–0.2)
BASOPHILS NFR BLD AUTO: 0.9 % (ref 0–1.5)
BILIRUB SERPL-MCNC: 0.3 MG/DL (ref 0–1.2)
BUN SERPL-MCNC: 6 MG/DL (ref 8–23)
BUN/CREAT SERPL: 5.9 (ref 7–25)
CALCIUM SPEC-SCNC: 9.2 MG/DL (ref 8.6–10.5)
CHLORIDE SERPL-SCNC: 104 MMOL/L (ref 98–107)
CHOLEST SERPL-MCNC: 245 MG/DL (ref 0–200)
CO2 SERPL-SCNC: 26.8 MMOL/L (ref 22–29)
CREAT SERPL-MCNC: 1.01 MG/DL (ref 0.76–1.27)
DEPRECATED RDW RBC AUTO: 50.6 FL (ref 37–54)
EGFRCR SERPLBLD CKD-EPI 2021: 78.5 ML/MIN/1.73
EOSINOPHIL # BLD AUTO: 0.08 10*3/MM3 (ref 0–0.4)
EOSINOPHIL NFR BLD AUTO: 1.8 % (ref 0.3–6.2)
ERYTHROCYTE [DISTWIDTH] IN BLOOD BY AUTOMATED COUNT: 13 % (ref 12.3–15.4)
GLOBULIN UR ELPH-MCNC: 2.7 GM/DL
GLUCOSE SERPL-MCNC: 89 MG/DL (ref 65–99)
HCT VFR BLD AUTO: 40 % (ref 37.5–51)
HDLC SERPL-MCNC: 53 MG/DL (ref 40–60)
HGB BLD-MCNC: 13.2 G/DL (ref 13–17.7)
IMM GRANULOCYTES # BLD AUTO: 0.01 10*3/MM3 (ref 0–0.05)
IMM GRANULOCYTES NFR BLD AUTO: 0.2 % (ref 0–0.5)
LDLC SERPL CALC-MCNC: 161 MG/DL (ref 0–100)
LDLC/HDLC SERPL: 2.98 {RATIO}
LYMPHOCYTES # BLD AUTO: 1.61 10*3/MM3 (ref 0.7–3.1)
LYMPHOCYTES NFR BLD AUTO: 37 % (ref 19.6–45.3)
MCH RBC QN AUTO: 34 PG (ref 26.6–33)
MCHC RBC AUTO-ENTMCNC: 33 G/DL (ref 31.5–35.7)
MCV RBC AUTO: 103.1 FL (ref 79–97)
MONOCYTES # BLD AUTO: 0.32 10*3/MM3 (ref 0.1–0.9)
MONOCYTES NFR BLD AUTO: 7.4 % (ref 5–12)
NEUTROPHILS NFR BLD AUTO: 2.29 10*3/MM3 (ref 1.7–7)
NEUTROPHILS NFR BLD AUTO: 52.7 % (ref 42.7–76)
PLATELET # BLD AUTO: 210 10*3/MM3 (ref 140–450)
PMV BLD AUTO: 10.1 FL (ref 6–12)
POTASSIUM SERPL-SCNC: 4.4 MMOL/L (ref 3.5–5.2)
PROT SERPL-MCNC: 6.9 G/DL (ref 6–8.5)
RBC # BLD AUTO: 3.88 10*6/MM3 (ref 4.14–5.8)
SODIUM SERPL-SCNC: 140 MMOL/L (ref 136–145)
TRIGL SERPL-MCNC: 170 MG/DL (ref 0–150)
TSH SERPL DL<=0.05 MIU/L-ACNC: 2.36 UIU/ML (ref 0.27–4.2)
VIT B12 BLD-MCNC: 291 PG/ML (ref 211–946)
VLDLC SERPL-MCNC: 31 MG/DL (ref 5–40)
WBC NRBC COR # BLD AUTO: 4.35 10*3/MM3 (ref 3.4–10.8)

## 2025-06-11 PROCEDURE — 85025 COMPLETE CBC W/AUTO DIFF WBC: CPT

## 2025-06-11 PROCEDURE — 80061 LIPID PANEL: CPT

## 2025-06-11 PROCEDURE — 80053 COMPREHEN METABOLIC PANEL: CPT

## 2025-06-11 PROCEDURE — 82607 VITAMIN B-12: CPT

## 2025-06-11 PROCEDURE — 84443 ASSAY THYROID STIM HORMONE: CPT

## 2025-06-11 PROCEDURE — 36415 COLL VENOUS BLD VENIPUNCTURE: CPT

## 2025-06-11 PROCEDURE — 82306 VITAMIN D 25 HYDROXY: CPT

## 2025-06-12 ENCOUNTER — RESULTS FOLLOW-UP (OUTPATIENT)
Dept: LAB | Facility: HOSPITAL | Age: 75
End: 2025-06-12
Payer: MEDICARE

## 2025-06-12 DIAGNOSIS — E78.2 MIXED HYPERLIPIDEMIA: ICD-10-CM

## 2025-06-12 RX ORDER — ACETAMINOPHEN 160 MG
2000 TABLET,DISINTEGRATING ORAL DAILY
Qty: 90 CAPSULE | Refills: 1 | Status: SHIPPED | OUTPATIENT
Start: 2025-06-12

## 2025-06-12 RX ORDER — ATORVASTATIN CALCIUM 80 MG/1
80 TABLET, FILM COATED ORAL DAILY
Qty: 90 TABLET | Refills: 1 | Status: SHIPPED | OUTPATIENT
Start: 2025-06-12

## 2025-07-02 DIAGNOSIS — R19.7 DIARRHEA, UNSPECIFIED TYPE: ICD-10-CM

## 2025-07-02 RX ORDER — PRIMIDONE 250 MG/1
TABLET ORAL
Qty: 45 TABLET | Refills: 2 | Status: SHIPPED | OUTPATIENT
Start: 2025-07-02

## 2025-07-02 RX ORDER — COLESTIPOL HYDROCHLORIDE 1 G/1
3 TABLET ORAL DAILY
Qty: 90 TABLET | Refills: 0 | Status: SHIPPED | OUTPATIENT
Start: 2025-07-02

## 2025-07-18 ENCOUNTER — OFFICE VISIT (OUTPATIENT)
Dept: FAMILY MEDICINE CLINIC | Age: 75
End: 2025-07-18
Payer: MEDICARE

## 2025-07-18 VITALS
SYSTOLIC BLOOD PRESSURE: 131 MMHG | OXYGEN SATURATION: 99 % | DIASTOLIC BLOOD PRESSURE: 88 MMHG | WEIGHT: 194.6 LBS | BODY MASS INDEX: 28.82 KG/M2 | HEIGHT: 69 IN | HEART RATE: 74 BPM | TEMPERATURE: 98.2 F

## 2025-07-18 DIAGNOSIS — S80.812A ABRASION, LEFT LOWER LEG, INITIAL ENCOUNTER: ICD-10-CM

## 2025-07-18 DIAGNOSIS — I10 PRIMARY HYPERTENSION: ICD-10-CM

## 2025-07-18 DIAGNOSIS — I87.2 VENOUS INSUFFICIENCY: Primary | ICD-10-CM

## 2025-07-18 PROBLEM — G25.2 OTHER SPECIFIED FORMS OF TREMOR: Status: RESOLVED | Noted: 2021-09-22 | Resolved: 2025-07-18

## 2025-07-18 NOTE — ASSESSMENT & PLAN NOTE
PROBABLY RELATED TO AGE, MEDS, CHRONIC LOW BACK ISSUES AND THE PRIOR DVT.  GENERAL ADVICE GIVEN RE:  ELEVATION, SALT / FLLUID INTAKE REDUCTION,, POSSIBLE COMPRESSION HOSE.  MAY NEED FURTHER WORKUP

## 2025-08-01 DIAGNOSIS — M17.0 PRIMARY OSTEOARTHRITIS OF BOTH KNEES: ICD-10-CM

## 2025-08-01 DIAGNOSIS — M25.512 BILATERAL SHOULDER PAIN, UNSPECIFIED CHRONICITY: ICD-10-CM

## 2025-08-01 DIAGNOSIS — M50.30 DEGENERATIVE CERVICAL DISC: ICD-10-CM

## 2025-08-01 DIAGNOSIS — M25.511 BILATERAL SHOULDER PAIN, UNSPECIFIED CHRONICITY: ICD-10-CM

## 2025-08-01 RX ORDER — MELOXICAM 15 MG/1
TABLET ORAL
Qty: 30 TABLET | Refills: 1 | OUTPATIENT
Start: 2025-08-01

## 2025-08-07 ENCOUNTER — RESULTS FOLLOW-UP (OUTPATIENT)
Dept: LAB | Facility: HOSPITAL | Age: 75
End: 2025-08-07
Payer: MEDICARE

## 2025-08-07 ENCOUNTER — OFFICE VISIT (OUTPATIENT)
Dept: FAMILY MEDICINE CLINIC | Age: 75
End: 2025-08-07
Payer: MEDICARE

## 2025-08-07 ENCOUNTER — LAB (OUTPATIENT)
Dept: LAB | Facility: HOSPITAL | Age: 75
End: 2025-08-07
Payer: MEDICARE

## 2025-08-07 ENCOUNTER — HOSPITAL ENCOUNTER (OUTPATIENT)
Dept: GENERAL RADIOLOGY | Facility: HOSPITAL | Age: 75
Discharge: HOME | End: 2025-08-07
Payer: MEDICARE

## 2025-08-07 VITALS
SYSTOLIC BLOOD PRESSURE: 109 MMHG | HEIGHT: 69 IN | DIASTOLIC BLOOD PRESSURE: 61 MMHG | WEIGHT: 199 LBS | HEART RATE: 76 BPM | BODY MASS INDEX: 29.47 KG/M2 | TEMPERATURE: 97.8 F | OXYGEN SATURATION: 98 %

## 2025-08-07 DIAGNOSIS — E55.9 VITAMIN D DEFICIENCY: ICD-10-CM

## 2025-08-07 DIAGNOSIS — R06.02 SOB (SHORTNESS OF BREATH): ICD-10-CM

## 2025-08-07 DIAGNOSIS — R60.0 LOCALIZED EDEMA: Primary | ICD-10-CM

## 2025-08-07 DIAGNOSIS — R60.0 LOCALIZED EDEMA: ICD-10-CM

## 2025-08-07 LAB
25(OH)D3 SERPL-MCNC: 34.5 NG/ML (ref 30–100)
ALBUMIN SERPL-MCNC: 4.5 G/DL (ref 3.5–5.2)
ALBUMIN/GLOB SERPL: 1.7 G/DL
ALP SERPL-CCNC: 123 U/L (ref 39–117)
ALT SERPL W P-5'-P-CCNC: 16 U/L (ref 1–41)
ANION GAP SERPL CALCULATED.3IONS-SCNC: 11.9 MMOL/L (ref 5–15)
AST SERPL-CCNC: 25 U/L (ref 1–40)
BACTERIA UR QL AUTO: ABNORMAL /HPF
BASOPHILS # BLD AUTO: 0.01 10*3/MM3 (ref 0–0.2)
BASOPHILS NFR BLD AUTO: 0.2 % (ref 0–1.5)
BILIRUB SERPL-MCNC: 0.4 MG/DL (ref 0–1.2)
BILIRUB UR QL STRIP: ABNORMAL
BUN SERPL-MCNC: 19 MG/DL (ref 8–23)
BUN/CREAT SERPL: 17.1 (ref 7–25)
CALCIUM SPEC-SCNC: 9.4 MG/DL (ref 8.6–10.5)
CHLORIDE SERPL-SCNC: 97 MMOL/L (ref 98–107)
CLARITY UR: CLEAR
CO2 SERPL-SCNC: 25.1 MMOL/L (ref 22–29)
COLOR UR: YELLOW
CREAT SERPL-MCNC: 1.11 MG/DL (ref 0.76–1.27)
D DIMER PPP FEU-MCNC: 1.51 MCGFEU/ML (ref 0–0.73)
DEPRECATED RDW RBC AUTO: 56.1 FL (ref 37–54)
EGFRCR SERPLBLD CKD-EPI 2021: 70.1 ML/MIN/1.73
EOSINOPHIL # BLD AUTO: 0.09 10*3/MM3 (ref 0–0.4)
EOSINOPHIL NFR BLD AUTO: 1.5 % (ref 0.3–6.2)
ERYTHROCYTE [DISTWIDTH] IN BLOOD BY AUTOMATED COUNT: 14.9 % (ref 12.3–15.4)
GLOBULIN UR ELPH-MCNC: 2.6 GM/DL
GLUCOSE SERPL-MCNC: 124 MG/DL (ref 65–99)
GLUCOSE UR STRIP-MCNC: NEGATIVE MG/DL
HCT VFR BLD AUTO: 37.4 % (ref 37.5–51)
HGB BLD-MCNC: 13 G/DL (ref 13–17.7)
HGB UR QL STRIP.AUTO: NEGATIVE
IMM GRANULOCYTES # BLD AUTO: 0 10*3/MM3 (ref 0–0.05)
IMM GRANULOCYTES NFR BLD AUTO: 0 % (ref 0–0.5)
KETONES UR QL STRIP: ABNORMAL
LEUKOCYTE ESTERASE UR QL STRIP.AUTO: NEGATIVE
LYMPHOCYTES # BLD AUTO: 1.09 10*3/MM3 (ref 0.7–3.1)
LYMPHOCYTES NFR BLD AUTO: 18.2 % (ref 19.6–45.3)
MCH RBC QN AUTO: 34.9 PG (ref 26.6–33)
MCHC RBC AUTO-ENTMCNC: 34.8 G/DL (ref 31.5–35.7)
MCV RBC AUTO: 100.5 FL (ref 79–97)
MONOCYTES # BLD AUTO: 0.41 10*3/MM3 (ref 0.1–0.9)
MONOCYTES NFR BLD AUTO: 6.8 % (ref 5–12)
MUCOUS THREADS URNS QL MICRO: ABNORMAL /HPF
NEUTROPHILS NFR BLD AUTO: 4.39 10*3/MM3 (ref 1.7–7)
NEUTROPHILS NFR BLD AUTO: 73.3 % (ref 42.7–76)
NITRITE UR QL STRIP: NEGATIVE
NT-PROBNP SERPL-MCNC: 76.2 PG/ML (ref 0–900)
PH UR STRIP.AUTO: 6 [PH] (ref 5–8)
PLATELET # BLD AUTO: 177 10*3/MM3 (ref 140–450)
PMV BLD AUTO: 9.6 FL (ref 6–12)
POTASSIUM SERPL-SCNC: 4.8 MMOL/L (ref 3.5–5.2)
PROT SERPL-MCNC: 7.1 G/DL (ref 6–8.5)
PROT UR QL STRIP: ABNORMAL
RBC # BLD AUTO: 3.72 10*6/MM3 (ref 4.14–5.8)
RBC # UR STRIP: ABNORMAL /HPF
REF LAB TEST METHOD: ABNORMAL
SODIUM SERPL-SCNC: 134 MMOL/L (ref 136–145)
SP GR UR STRIP: >=1.03 (ref 1–1.03)
SQUAMOUS #/AREA URNS HPF: ABNORMAL /HPF
UROBILINOGEN UR QL STRIP: ABNORMAL
WBC # UR STRIP: ABNORMAL /HPF
WBC NRBC COR # BLD AUTO: 5.99 10*3/MM3 (ref 3.4–10.8)

## 2025-08-07 PROCEDURE — 71046 X-RAY EXAM CHEST 2 VIEWS: CPT

## 2025-08-07 PROCEDURE — 80053 COMPREHEN METABOLIC PANEL: CPT

## 2025-08-07 PROCEDURE — 85025 COMPLETE CBC W/AUTO DIFF WBC: CPT

## 2025-08-07 PROCEDURE — 1125F AMNT PAIN NOTED PAIN PRSNT: CPT | Performed by: NURSE PRACTITIONER

## 2025-08-07 PROCEDURE — 83880 ASSAY OF NATRIURETIC PEPTIDE: CPT

## 2025-08-07 PROCEDURE — 81001 URINALYSIS AUTO W/SCOPE: CPT

## 2025-08-07 PROCEDURE — 36415 COLL VENOUS BLD VENIPUNCTURE: CPT

## 2025-08-07 PROCEDURE — 85379 FIBRIN DEGRADATION QUANT: CPT

## 2025-08-07 PROCEDURE — 82306 VITAMIN D 25 HYDROXY: CPT

## 2025-08-07 PROCEDURE — 3078F DIAST BP <80 MM HG: CPT | Performed by: NURSE PRACTITIONER

## 2025-08-07 PROCEDURE — 99214 OFFICE O/P EST MOD 30 MIN: CPT | Performed by: NURSE PRACTITIONER

## 2025-08-07 PROCEDURE — 1159F MED LIST DOCD IN RCRD: CPT | Performed by: NURSE PRACTITIONER

## 2025-08-07 PROCEDURE — 3074F SYST BP LT 130 MM HG: CPT | Performed by: NURSE PRACTITIONER

## 2025-08-07 PROCEDURE — 1160F RVW MEDS BY RX/DR IN RCRD: CPT | Performed by: NURSE PRACTITIONER

## 2025-08-07 RX ORDER — MELOXICAM 15 MG/1
15 TABLET ORAL DAILY
COMMUNITY

## 2025-08-13 ENCOUNTER — OFFICE VISIT (OUTPATIENT)
Dept: FAMILY MEDICINE CLINIC | Age: 75
End: 2025-08-13
Payer: MEDICARE

## 2025-08-13 ENCOUNTER — HOSPITAL ENCOUNTER (OUTPATIENT)
Dept: ULTRASOUND IMAGING | Facility: HOSPITAL | Age: 75
Discharge: HOME | End: 2025-08-13
Admitting: NURSE PRACTITIONER
Payer: MEDICARE

## 2025-08-13 ENCOUNTER — RESULTS FOLLOW-UP (OUTPATIENT)
Dept: FAMILY MEDICINE CLINIC | Age: 75
End: 2025-08-13

## 2025-08-13 VITALS
HEART RATE: 72 BPM | DIASTOLIC BLOOD PRESSURE: 72 MMHG | WEIGHT: 198 LBS | TEMPERATURE: 97.5 F | SYSTOLIC BLOOD PRESSURE: 116 MMHG | HEIGHT: 69 IN | BODY MASS INDEX: 29.33 KG/M2 | OXYGEN SATURATION: 98 %

## 2025-08-13 DIAGNOSIS — R79.89 POSITIVE D DIMER: ICD-10-CM

## 2025-08-13 DIAGNOSIS — R60.0 LOCALIZED EDEMA: ICD-10-CM

## 2025-08-13 DIAGNOSIS — M25.511 BILATERAL SHOULDER PAIN, UNSPECIFIED CHRONICITY: Primary | ICD-10-CM

## 2025-08-13 DIAGNOSIS — M25.512 BILATERAL SHOULDER PAIN, UNSPECIFIED CHRONICITY: Primary | ICD-10-CM

## 2025-08-13 DIAGNOSIS — R53.83 OTHER FATIGUE: ICD-10-CM

## 2025-08-13 DIAGNOSIS — M17.0 PRIMARY OSTEOARTHRITIS OF BOTH KNEES: ICD-10-CM

## 2025-08-13 PROCEDURE — 93970 EXTREMITY STUDY: CPT

## 2025-08-19 ENCOUNTER — HOSPITAL ENCOUNTER (OUTPATIENT)
Dept: CT IMAGING | Facility: HOSPITAL | Age: 75
Discharge: HOME | End: 2025-08-19
Admitting: NURSE PRACTITIONER
Payer: MEDICARE

## 2025-08-19 DIAGNOSIS — R53.83 OTHER FATIGUE: ICD-10-CM

## 2025-08-19 DIAGNOSIS — R60.0 LOCALIZED EDEMA: ICD-10-CM

## 2025-08-19 DIAGNOSIS — R79.89 POSITIVE D DIMER: ICD-10-CM

## 2025-08-19 PROCEDURE — 25510000001 IOPAMIDOL PER 1 ML: Performed by: NURSE PRACTITIONER

## 2025-08-19 PROCEDURE — 71260 CT THORAX DX C+: CPT

## 2025-08-19 RX ORDER — IOPAMIDOL 755 MG/ML
100 INJECTION, SOLUTION INTRAVASCULAR
Status: COMPLETED | OUTPATIENT
Start: 2025-08-19 | End: 2025-08-19

## 2025-08-19 RX ADMIN — IOPAMIDOL 100 ML: 755 INJECTION, SOLUTION INTRAVENOUS at 15:56

## 2025-08-20 DIAGNOSIS — R06.02 SOB (SHORTNESS OF BREATH): ICD-10-CM

## 2025-08-20 DIAGNOSIS — R53.83 OTHER FATIGUE: Primary | ICD-10-CM

## 2025-08-26 ENCOUNTER — TELEPHONE (OUTPATIENT)
Dept: FAMILY MEDICINE CLINIC | Age: 75
End: 2025-08-26
Payer: MEDICARE